# Patient Record
Sex: MALE | Race: WHITE | NOT HISPANIC OR LATINO | Employment: OTHER | URBAN - METROPOLITAN AREA
[De-identification: names, ages, dates, MRNs, and addresses within clinical notes are randomized per-mention and may not be internally consistent; named-entity substitution may affect disease eponyms.]

---

## 2020-01-01 ENCOUNTER — HOSPITAL ENCOUNTER (EMERGENCY)
Facility: HOSPITAL | Age: 75
Discharge: HOME | End: 2020-09-25
Attending: SURGERY
Payer: MEDICARE

## 2020-01-01 ENCOUNTER — APPOINTMENT (EMERGENCY)
Dept: CT IMAGING | Facility: HOSPITAL | Age: 75
End: 2020-01-01
Payer: MEDICARE

## 2020-01-01 ENCOUNTER — HOSPITAL ENCOUNTER (EMERGENCY)
Facility: HOSPITAL | Age: 75
Discharge: NURSING, REHAB OR LONG-TERM CARE | End: 2020-09-25
Attending: EMERGENCY MEDICINE | Admitting: EMERGENCY MEDICINE
Payer: MEDICARE

## 2020-01-01 VITALS
WEIGHT: 240 LBS | HEART RATE: 69 BPM | SYSTOLIC BLOOD PRESSURE: 120 MMHG | HEIGHT: 73 IN | BODY MASS INDEX: 31.81 KG/M2 | OXYGEN SATURATION: 98 % | RESPIRATION RATE: 18 BRPM | TEMPERATURE: 97.8 F | DIASTOLIC BLOOD PRESSURE: 79 MMHG

## 2020-01-01 VITALS
DIASTOLIC BLOOD PRESSURE: 70 MMHG | RESPIRATION RATE: 18 BRPM | TEMPERATURE: 97.4 F | HEART RATE: 74 BPM | OXYGEN SATURATION: 90 % | SYSTOLIC BLOOD PRESSURE: 115 MMHG

## 2020-01-01 DIAGNOSIS — S22.43XA CLOSED FRACTURE OF MULTIPLE RIBS OF BOTH SIDES, INITIAL ENCOUNTER: Primary | ICD-10-CM

## 2020-01-01 DIAGNOSIS — S02.2XXA CLOSED FRACTURE OF NASAL BONE, INITIAL ENCOUNTER: ICD-10-CM

## 2020-01-01 DIAGNOSIS — R79.1 SUPRATHERAPEUTIC INR: ICD-10-CM

## 2020-01-01 DIAGNOSIS — R91.1 LUNG NODULE: ICD-10-CM

## 2020-01-01 DIAGNOSIS — J98.11 ATELECTASIS: ICD-10-CM

## 2020-01-01 DIAGNOSIS — K57.90 DIVERTICULOSIS: ICD-10-CM

## 2020-01-01 DIAGNOSIS — R04.0 EPISTAXIS: ICD-10-CM

## 2020-01-01 DIAGNOSIS — R04.0 EPISTAXIS DUE TO TRAUMA: ICD-10-CM

## 2020-01-01 DIAGNOSIS — S32.010A CLOSED COMPRESSION FRACTURE OF BODY OF L1 VERTEBRA (HCC): ICD-10-CM

## 2020-01-01 DIAGNOSIS — M47.812 DJD (DEGENERATIVE JOINT DISEASE) OF CERVICAL SPINE: ICD-10-CM

## 2020-01-01 DIAGNOSIS — S32.009A LUMBAR TRANSVERSE PROCESS FRACTURE, CLOSED, INITIAL ENCOUNTER (HCC): ICD-10-CM

## 2020-01-01 DIAGNOSIS — K80.20 CHOLELITHIASIS: ICD-10-CM

## 2020-01-01 DIAGNOSIS — K40.90 INGUINAL HERNIA: ICD-10-CM
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
## 2020-01-01 DIAGNOSIS — I31.3 PERICARDIAL EFFUSION: ICD-10-CM

## 2020-01-01 DIAGNOSIS — S02.2XXA CLOSED FRACTURE OF NASAL BONE, INITIAL ENCOUNTER: Primary | ICD-10-CM

## 2020-01-01 LAB
ABO GROUP BLD: NORMAL
ABO GROUP BLD: NORMAL
ALBUMIN SERPL BCP-MCNC: 3.5 G/DL (ref 3.5–5)
ALP SERPL-CCNC: 202 U/L (ref 46–116)
ALT SERPL W P-5'-P-CCNC: 29 U/L (ref 12–78)
ANION GAP SERPL CALCULATED.3IONS-SCNC: 12 MMOL/L (ref 4–13)
APTT PPP: 69 SECONDS (ref 23–37)
AST SERPL W P-5'-P-CCNC: 24 U/L (ref 5–45)
ATRIAL RATE: 110 BPM
BASOPHILS # BLD AUTO: 0.04 THOUSANDS/ΜL (ref 0–0.1)
BASOPHILS NFR BLD AUTO: 1 % (ref 0–1)
BILIRUB SERPL-MCNC: 1.76 MG/DL (ref 0.2–1)
BLD GP AB SCN SERPL QL: NEGATIVE
BUN SERPL-MCNC: 18 MG/DL (ref 5–25)
CALCIUM SERPL-MCNC: 8.9 MG/DL (ref 8.3–10.1)
CHLORIDE SERPL-SCNC: 105 MMOL/L (ref 100–108)
CO2 SERPL-SCNC: 24 MMOL/L (ref 21–32)
CREAT SERPL-MCNC: 1.17 MG/DL (ref 0.6–1.3)
EOSINOPHIL # BLD AUTO: 0.15 THOUSAND/ΜL (ref 0–0.61)
EOSINOPHIL NFR BLD AUTO: 2 % (ref 0–6)
ERYTHROCYTE [DISTWIDTH] IN BLOOD BY AUTOMATED COUNT: 13.6 % (ref 11.6–15.1)
GFR SERPL CREATININE-BSD FRML MDRD: 61 ML/MIN/1.73SQ M
GLUCOSE SERPL-MCNC: 137 MG/DL (ref 65–140)
HCT VFR BLD AUTO: 44.1 % (ref 36.5–49.3)
HGB BLD-MCNC: 14.4 G/DL (ref 12–17)
IMM GRANULOCYTES # BLD AUTO: 0.03 THOUSAND/UL (ref 0–0.2)
IMM GRANULOCYTES NFR BLD AUTO: 0 % (ref 0–2)
INR PPP: 2.24 (ref 0.84–1.19)
INR PPP: 4.73 (ref 0.84–1.19)
LIPASE SERPL-CCNC: 161 U/L (ref 73–393)
LYMPHOCYTES # BLD AUTO: 1.24 THOUSANDS/ΜL (ref 0.6–4.47)
LYMPHOCYTES NFR BLD AUTO: 16 % (ref 14–44)
MCH RBC QN AUTO: 31.8 PG (ref 26.8–34.3)
MCHC RBC AUTO-ENTMCNC: 32.7 G/DL (ref 31.4–37.4)
MCV RBC AUTO: 97 FL (ref 82–98)
MONOCYTES # BLD AUTO: 0.99 THOUSAND/ΜL (ref 0.17–1.22)
MONOCYTES NFR BLD AUTO: 13 % (ref 4–12)
NEUTROPHILS # BLD AUTO: 5.5 THOUSANDS/ΜL (ref 1.85–7.62)
NEUTS SEG NFR BLD AUTO: 68 % (ref 43–75)
NRBC BLD AUTO-RTO: 0 /100 WBCS
PLATELET # BLD AUTO: 279 THOUSANDS/UL (ref 149–390)
PMV BLD AUTO: 8.6 FL (ref 8.9–12.7)
POTASSIUM SERPL-SCNC: 4 MMOL/L (ref 3.5–5.3)
PROT SERPL-MCNC: 7.1 G/DL (ref 6.4–8.2)
PROTHROMBIN TIME: 24.7 SECONDS (ref 11.6–14.5)
PROTHROMBIN TIME: 44.4 SECONDS (ref 11.6–14.5)
QRS AXIS: 56 DEGREES
QRSD INTERVAL: 86 MS
QT INTERVAL: 344 MS
QTC INTERVAL: 394 MS
RBC # BLD AUTO: 4.53 MILLION/UL (ref 3.88–5.62)
RH BLD: POSITIVE
RH BLD: POSITIVE
SODIUM SERPL-SCNC: 141 MMOL/L (ref 136–145)
SPECIMEN EXPIRATION DATE: NORMAL
T WAVE AXIS: 254 DEGREES
TROPONIN I SERPL-MCNC: <0.02 NG/ML
VENTRICULAR RATE: 79 BPM
WBC # BLD AUTO: 7.95 THOUSAND/UL (ref 4.31–10.16)

## 2020-01-01 PROCEDURE — 99284 EMERGENCY DEPT VISIT MOD MDM: CPT | Performed by: SURGERY

## 2020-01-01 PROCEDURE — 96361 HYDRATE IV INFUSION ADD-ON: CPT

## 2020-01-01 PROCEDURE — 86900 BLOOD TYPING SEROLOGIC ABO: CPT | Performed by: PHYSICIAN ASSISTANT

## 2020-01-01 PROCEDURE — 96365 THER/PROPH/DIAG IV INF INIT: CPT

## 2020-01-01 PROCEDURE — 99284 EMERGENCY DEPT VISIT MOD MDM: CPT

## 2020-01-01 PROCEDURE — 93010 ELECTROCARDIOGRAM REPORT: CPT | Performed by: INTERNAL MEDICINE

## 2020-01-01 PROCEDURE — 70450 CT HEAD/BRAIN W/O DYE: CPT

## 2020-01-01 PROCEDURE — 83690 ASSAY OF LIPASE: CPT | Performed by: PHYSICIAN ASSISTANT

## 2020-01-01 PROCEDURE — 96374 THER/PROPH/DIAG INJ IV PUSH: CPT

## 2020-01-01 PROCEDURE — 36415 COLL VENOUS BLD VENIPUNCTURE: CPT | Performed by: PHYSICIAN ASSISTANT

## 2020-01-01 PROCEDURE — 80053 COMPREHEN METABOLIC PANEL: CPT | Performed by: PHYSICIAN ASSISTANT

## 2020-01-01 PROCEDURE — 85610 PROTHROMBIN TIME: CPT | Performed by: PHYSICIAN ASSISTANT

## 2020-01-01 PROCEDURE — 86850 RBC ANTIBODY SCREEN: CPT | Performed by: PHYSICIAN ASSISTANT

## 2020-01-01 PROCEDURE — 71260 CT THORAX DX C+: CPT

## 2020-01-01 PROCEDURE — 30901 CONTROL OF NOSEBLEED: CPT | Performed by: PHYSICIAN ASSISTANT

## 2020-01-01 PROCEDURE — 99291 CRITICAL CARE FIRST HOUR: CPT

## 2020-01-01 PROCEDURE — 72125 CT NECK SPINE W/O DYE: CPT

## 2020-01-01 PROCEDURE — 99282 EMERGENCY DEPT VISIT SF MDM: CPT | Performed by: OTOLARYNGOLOGY

## 2020-01-01 PROCEDURE — 84484 ASSAY OF TROPONIN QUANT: CPT | Performed by: PHYSICIAN ASSISTANT

## 2020-01-01 PROCEDURE — 85730 THROMBOPLASTIN TIME PARTIAL: CPT | Performed by: PHYSICIAN ASSISTANT

## 2020-01-01 PROCEDURE — G1004 CDSM NDSC: HCPCS

## 2020-01-01 PROCEDURE — 74177 CT ABD & PELVIS W/CONTRAST: CPT

## 2020-01-01 PROCEDURE — 70486 CT MAXILLOFACIAL W/O DYE: CPT

## 2020-01-01 PROCEDURE — 99291 CRITICAL CARE FIRST HOUR: CPT | Performed by: PHYSICIAN ASSISTANT

## 2020-01-01 PROCEDURE — 86901 BLOOD TYPING SEROLOGIC RH(D): CPT | Performed by: PHYSICIAN ASSISTANT

## 2020-01-01 PROCEDURE — 93005 ELECTROCARDIOGRAM TRACING: CPT

## 2020-01-01 PROCEDURE — 85025 COMPLETE CBC W/AUTO DIFF WBC: CPT | Performed by: PHYSICIAN ASSISTANT

## 2020-01-01 RX ORDER — OXYMETAZOLINE HYDROCHLORIDE 0.05 G/100ML
2 SPRAY NASAL ONCE
Status: COMPLETED | OUTPATIENT
Start: 2020-01-01 | End: 2020-01-01

## 2020-01-01 RX ORDER — PHYTONADIONE 10 MG/ML
2.5 INJECTION, EMULSION INTRAMUSCULAR; INTRAVENOUS; SUBCUTANEOUS ONCE
Status: DISCONTINUED | OUTPATIENT
Start: 2020-01-01 | End: 2020-01-01

## 2020-01-01 RX ORDER — ATORVASTATIN CALCIUM 20 MG/1
10 TABLET, FILM COATED ORAL DAILY
COMMUNITY
Start: 2020-01-01 | End: 2021-01-01 | Stop reason: HOSPADM

## 2020-01-01 RX ORDER — OXYCODONE HYDROCHLORIDE 5 MG/1
2.5 TABLET ORAL EVERY 4 HOURS PRN
Status: DISCONTINUED | OUTPATIENT
Start: 2020-01-01 | End: 2020-01-01 | Stop reason: HOSPADM

## 2020-01-01 RX ORDER — LIDOCAINE HYDROCHLORIDE AND EPINEPHRINE 10; 10 MG/ML; UG/ML
10 INJECTION, SOLUTION INFILTRATION; PERINEURAL ONCE
Status: COMPLETED | OUTPATIENT
Start: 2020-01-01 | End: 2020-01-01

## 2020-01-01 RX ORDER — WARFARIN SODIUM 5 MG/1
5 TABLET ORAL DAILY
COMMUNITY
Start: 2020-01-01 | End: 2020-01-01

## 2020-01-01 RX ORDER — OXYCODONE HYDROCHLORIDE 5 MG/1
5 TABLET ORAL EVERY 4 HOURS PRN
Status: DISCONTINUED | OUTPATIENT
Start: 2020-01-01 | End: 2020-01-01 | Stop reason: HOSPADM

## 2020-01-01 RX ORDER — OMEPRAZOLE 20 MG/1
20 CAPSULE, DELAYED RELEASE ORAL DAILY
COMMUNITY
Start: 2020-01-01 | End: 2021-01-01 | Stop reason: SDUPTHER

## 2020-01-01 RX ORDER — FENTANYL CITRATE 50 UG/ML
50 INJECTION, SOLUTION INTRAMUSCULAR; INTRAVENOUS ONCE
Status: COMPLETED | OUTPATIENT
Start: 2020-01-01 | End: 2020-01-01

## 2020-01-01 RX ORDER — TAMSULOSIN HYDROCHLORIDE 0.4 MG/1
0.4 CAPSULE ORAL DAILY
COMMUNITY
Start: 2020-01-01 | End: 2021-01-01 | Stop reason: SDUPTHER

## 2020-01-01 RX ORDER — ACETAMINOPHEN 325 MG/1
975 TABLET ORAL EVERY 8 HOURS SCHEDULED
Status: DISCONTINUED | OUTPATIENT
Start: 2020-01-01 | End: 2020-01-01 | Stop reason: HOSPADM

## 2020-01-01 RX ORDER — TRANEXAMIC ACID 100 MG/ML
500 INJECTION, SOLUTION INTRAVENOUS ONCE
Status: COMPLETED | OUTPATIENT
Start: 2020-01-01 | End: 2020-01-01

## 2020-01-01 RX ADMIN — IOHEXOL 100 ML: 350 INJECTION, SOLUTION INTRAVENOUS at 06:10

## 2020-01-01 RX ADMIN — ACETAMINOPHEN 975 MG: 325 TABLET, FILM COATED ORAL at 13:39

## 2020-01-01 RX ADMIN — OXYCODONE HYDROCHLORIDE 5 MG: 5 TABLET ORAL at 11:16

## 2020-01-01 RX ADMIN — SILVER NITRATE APPLICATORS 1 APPLICATOR: 25; 75 STICK TOPICAL at 05:16

## 2020-01-01 RX ADMIN — OXYCODONE HYDROCHLORIDE 5 MG: 5 TABLET ORAL at 16:21

## 2020-01-01 RX ADMIN — LIDOCAINE HYDROCHLORIDE,EPINEPHRINE BITARTRATE 10 ML: 10; .01 INJECTION, SOLUTION INFILTRATION; PERINEURAL at 05:13

## 2020-01-01 RX ADMIN — PHYTONADIONE 2.5 MG: 10 INJECTION, EMULSION INTRAMUSCULAR; INTRAVENOUS; SUBCUTANEOUS at 10:45

## 2020-01-01 RX ADMIN — FENTANYL CITRATE 50 MCG: 50 INJECTION INTRAMUSCULAR; INTRAVENOUS at 05:58

## 2020-01-01 RX ADMIN — TRANEXAMIC ACID 500 MG: 1 INJECTION, SOLUTION INTRAVENOUS at 05:13

## 2020-01-01 RX ADMIN — OXYMETAZOLINE HYDROCHLORIDE 2 SPRAY: 0.05 SPRAY NASAL at 05:13

## 2020-01-01 RX ADMIN — SODIUM CHLORIDE 500 ML: 0.9 INJECTION, SOLUTION INTRAVENOUS at 05:16

## 2020-09-25 PROBLEM — R04.0 EPISTAXIS DUE TO TRAUMA: Status: ACTIVE | Noted: 2020-01-01

## 2020-09-25 PROBLEM — D68.9 COAGULOPATHY (HCC): Status: ACTIVE | Noted: 2020-01-01

## 2020-09-25 PROBLEM — S02.2XXA NASAL BONE FRACTURE: Status: ACTIVE | Noted: 2020-01-01

## 2020-09-25 PROBLEM — S32.009A LUMBAR TRANSVERSE PROCESS FRACTURE (HCC): Status: ACTIVE | Noted: 2020-01-01

## 2020-09-25 PROBLEM — S22.49XA MULTIPLE RIB FRACTURES: Status: ACTIVE | Noted: 2020-01-01
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
## 2020-09-25 NOTE — EMTALA/ACUTE CARE TRANSFER
[address] [place] [address]  Dept: [phone]      EMTALA TRANSFER CONSENT    NAME Loraine Mirza                                         1945                              MRN [id number]    I have been informed of my rights regarding examination, treatment, and transfer   by Dr Toscano att  providers found    Benefits: Specialized equipment and/or services available at the receiving facility (Include comment)________________________(Trauma Service)    Risks: Potential for delay in receiving treatment, Potential deterioration of medical condition, Loss of IV, Increased discomfort during transfer, Possible worsening of condition or death during transfer      Consent for Transfer:  I acknowledge that my medical condition has been evaluated and explained to me by the emergency department physician or other qualified medical person and/or my attending physician, who has recommended that I be transferred to the service of  Accepting Physician: Dr Gertrude Holter at [address] [place] [address] Name, [address] : Yunielamparo Kam  The above potential benefits of such transfer, the potential risks associated with such transfer, and the probable risks of not being transferred have been explained to me, and I fully understand them  The doctor has explained that, in my case, the benefits of transfer outweigh the risks  I agree to be transferred  I authorize the performance of emergency medical procedures and treatments upon me in both transit and upon arrival at the receiving facility  Additionally, I authorize the release of any and all medical records to the receiving facility and request they be transported with me, if possible  I understand that the safest mode of transportation during a medical emergency is an ambulance and that the Hospital advocates the use of this mode of transport   Risks of traveling to the receiving facility by car, including absence of medical control, life sustaining equipment, such as oxygen, and medical personnel has been explained to me and I fully understand them  (MIRANDA CORRECT BOX BELOW)  [place]  ]  I consent to the stated transfer and to be transported by ambulance/helicopter  [  ]  I consent to the stated transfer, but refuse transportation by ambulance and accept full responsibility for my transportation by car  I understand the risks of non-ambulance transfers and I exonerate the Hospital and its staff from any deterioration in my condition that results from this refusal     X___________________________________________    DATE  20  TIME________  Signature of patient or legally responsible individual signing on patient behalf           RELATIONSHIP TO PATIENT_________________________          Provider Certification    NAME Asim Franco                                        [facility] 1945                              MRN [id number]    A medical screening exam was performed on the above named patient  Based on the examination:    Condition Necessitating Transfer The primary encounter diagnosis was Closed fracture of multiple ribs of both sides, initial encounter  Diagnoses of Supratherapeutic INR, Epistaxis due to trauma, Closed fracture of nasal bone, initial encounter, Lumbar transverse process fracture, closed, initial encounter ([facility] [address] ), Fall from height of greater than 3 feet, Lung nodule, Atelectasis, Pericardial effusion, Cholelithiasis, Diverticulosis, Inguinal hernia, Closed compression fracture of body of L1 vertebra (HCC), and DJD (degenerative joint disease) of cervical spine were also pertinent to this visit      Patient Condition: The patient has been stabilized such that within reasonable medical probability, no material deterioration of the patient condition or the condition of the unborn child(chantelle) is likely to result from the transfer    Reason for Transfer: Other (Include comment)____________________(Trauma Service)    Transfer Requirements: [address]   · Space available and qualified personnel available for treatment as acknowledged by    · Agreed to accept transfer and to provide appropriate medical treatment as acknowledged by       Dr Gwen Quintana  · Appropriate medical records of the examination and treatment of the patient are provided at the time of transfer   [address] _______  · Transfer will be performed by qualified personnel from    and appropriate transfer equipment as required, including the use of necessary and appropriate life support measures  Provider Certification: I have examined the patient and explained the following risks and benefits of being transferred/refusing transfer to the patient/family:  General risk, such as traffic hazards, adverse weather conditions, rough terrain or turbulence, possible failure of equipment (including vehicle or aircraft), or consequences of actions of persons outside the control of the transport personnel, Unanticipated needs of medical equipment and personnel during transport, Risk of worsening condition, The possibility of a transport vehicle being unavailable, Consent was not obtained as patient is committed to psychiatric facility and transfer is mandated      Based on these reasonable risks and benefits to the patient and/or the unborn child(chantelle), and based upon the information available at the time of the patients examination, I certify that the medical benefits reasonably to be expected from the provision of appropriate medical treatments at another medical facility outweigh the increasing risks, if any, to the individuals medical condition, and in the case of labor to the unborn child, from effecting the transfer      X____________________________________________ DATE 09/25/20        TIME_______      ORIGINAL - SEND TO MEDICAL RECORDS   COPY - SEND WITH PATIENT DURING TRANSFER

## 2020-09-25 NOTE — ED PROVIDER NOTES
EMERGENCY MEDICINE NOTE        PATIENT IDENTIFICATION PHYSICIAN/SERVICE INFORMATION   Name: Kendall Bryant  MRN: [id number]  YOB: 1945  Age/Sex: 70 y o  male  Preferred Language: English  Code Status: No Order  Encounter Date: 9/25/2020  Attending Physician: Wendy Peoples MD  Admitting Physician: No admitting provider for patient encounter  Primary Care Physician: No primary care provider on file  Primary Care Phone: None       CHIEF COMPLAINT     Chief Complaint   Patient presents with    Nose Bleed     Pt reports ongoing nosebleed since he woke up around 1 hr ago, +thinners last took 4 days ago -dizziness          HISTORY OF PRESENT ILLNESS     Kendall Bryant is a 70 y o  male who presents due to Nosebleed  Pt reports Fall 9/19/2020, seen at [facility], had CXR which per patient without obvious fx, d/c home with tylenol, later returned 9/24/2020 for recurrent episode of nosebleed, received packing which was later in visit removed without recurrence of bleed and discharged home with instructions to hold coumadin yesterday, though resume today  Pt notes that he has not had coumadin for 4 days, as he has had multiple episodes of nosebleeds since fall  Pt now comes in with recurrent bleed blood in oropharynx, profuse left sided nasal bleeding, unable to control with afrin, applying pressure  Pt additional complaints of acute on chronic back pain, chest pain with movement/change positions/breathing  Denies palpitations, sob, lightheadedness, dizziness, syncope, abdominal pain, hematuria, hematochezia, hematemesis, and no other complaints at this time          History provided by:  Patient   used: No    Nose Bleed   Location:  L nare  Severity:  Severe  Duration:  2 hours  Timing:  Constant  Progression:  Waxing and waning  Chronicity:  Recurrent  Context: anticoagulants and trauma (fall from tree, approximately 15-20 ft on 9/19/2020)    Context: not aspirin use, not BiPAP, not bleeding disorder, not CPAP, not drug use, not elevation change, not foreign body, not home oxygen, not hypertension, not nose picking, not recent infection, not thrombocytopenia and not weather change    Relieved by:  Applying pressure  Worsened by:  Nothing  Ineffective treatments:  None tried  Associated symptoms: blood in oropharynx and facial pain    Associated symptoms: no congestion, no cough, no dizziness, no fever, no headaches, no sinus pain, no sneezing, no sore throat and no syncope    Risk factors: no alcohol use, no allergies, no change in medication, no frequent nosebleeds, no head and neck surgery, no head and neck tumor, no intranasal steroids, no liver disease, no radiation treatment, no recent chemotherapy, no recent nasal surgery and no sinus problems          PAST MEDICAL AND SURGICAL HISTORY     Past Medical History:   Diagnosis Date    Irregular heart beat        History reviewed  No pertinent surgical history  History reviewed  No pertinent family history  E-Cigarette/Vaping     E-Cigarette/Vaping Substances     Social History     Tobacco Use    Smoking status: Never Smoker    Smokeless tobacco: Never Used   Substance Use Topics    Alcohol use: Not Currently    Drug use: Never         ALLERGIES     No Known Allergies      HOME MEDICATIONS     Prior to Admission Medications   Prescriptions Last Dose Informant Patient Reported?  Taking?   atorvastatin (LIPITOR) 20 mg tablet 9/24/2020 at Unknown time  Yes Yes   Sig: Take 10 mg by mouth daily   omeprazole (PriLOSEC) 20 mg delayed release capsule 9/24/2020 at Unknown time  Yes Yes   Sig: Take 20 mg by mouth daily   tamsulosin (FLOMAX) 0 4 mg 9/24/2020 at Unknown time  Yes Yes   Sig: Take 0 4 mg by mouth daily   warfarin (COUMADIN) 5 mg tablet Past Week at Unknown time  Yes Yes   Sig: Take 5 mg by mouth daily      Facility-Administered Medications: None         REVIEW OF SYSTEMS     Review of Systems   Constitutional: Negative for activity change, appetite change, chills, diaphoresis, fatigue and fever  HENT: Positive for nosebleeds  Negative for congestion, drooling, ear discharge, ear pain, facial swelling, postnasal drip, rhinorrhea, sinus pressure, sinus pain, sneezing, sore throat, trouble swallowing and voice change  Eyes: Negative for discharge  Respiratory: Negative for apnea, cough, choking, chest tightness, shortness of breath, wheezing and stridor  Cardiovascular: Positive for chest pain  Negative for syncope  Gastrointestinal: Negative for abdominal distention, abdominal pain, constipation, diarrhea, nausea and vomiting  Genitourinary: Negative for decreased urine volume, difficulty urinating, dysuria, flank pain, frequency, genital sores, hematuria and urgency  Musculoskeletal: Negative for arthralgias and joint swelling  Skin: Negative for rash and wound  Neurological: Negative for dizziness, light-headedness and headaches  Hematological: Negative for adenopathy  Psychiatric/Behavioral: The patient is not nervous/anxious  All other systems reviewed and are negative  PHYSICAL EXAMINATION     ED Triage Vitals   Temperature Pulse Respirations Blood Pressure SpO2   09/25/20 0640 09/25/20 0506 09/25/20 0506 09/25/20 0506 09/25/20 0506   97 8 °F (36 6 °C) 104 18 136/70 99 %      Temp Source Heart Rate Source Patient Position - Orthostatic VS BP Location FiO2 (%)   09/25/20 0640 09/25/20 0506 09/25/20 0506 09/25/20 0506 --   Oral Monitor Sitting Right arm       Pain Score       09/25/20 0639       No Pain         Wt Readings from Last 3 Encounters:   09/25/20 109 kg (240 lb)      Primary Survey:     (A) Airway: intact  (B) Breathing: spontaneous, no flail chest  (C) Circulation: Pulses:   carotid  2/4, pedal  2/4, radial  2/4 and femoral  2/4  (D) Disabliity:  GCS Total:  15  (E) Expose:  Completed    Secondary Survey:    Cervical spine cleared by clinical criteria?  No (imaging required) Physical Exam  Vitals signs and nursing note reviewed  Constitutional:       General: He is in acute distress  Appearance: He is well-developed  He is not ill-appearing, toxic-appearing or diaphoretic  HENT:      Head: Normocephalic and atraumatic  Right Ear: Tympanic membrane, ear canal and external ear normal       Left Ear: Tympanic membrane, ear canal and external ear normal       Nose: Nasal deformity, signs of injury and nasal tenderness present  No mucosal edema, congestion or rhinorrhea  Right Nostril: No foreign body, epistaxis, septal hematoma or occlusion  Left Nostril: Epistaxis present  No foreign body, septal hematoma or occlusion  Right Sinus: No maxillary sinus tenderness or frontal sinus tenderness  Left Sinus: No maxillary sinus tenderness or frontal sinus tenderness  Mouth/Throat:      Mouth: Mucous membranes are moist       Comments: Blood in oropharynx  Eyes:      Extraocular Movements: Extraocular movements intact  Conjunctiva/sclera: Conjunctivae normal       Pupils: Pupils are equal, round, and reactive to light  Neck:      Musculoskeletal: Normal range of motion and neck supple  No muscular tenderness  Cardiovascular:      Rate and Rhythm: Normal rate  Rhythm irregularly irregular  Heart sounds: Normal heart sounds  No murmur  No friction rub  No gallop  Pulmonary:      Effort: Pulmonary effort is normal  No respiratory distress  Breath sounds: Normal breath sounds  No wheezing or rales  Chest:      Chest wall: Tenderness present  No mass, lacerations, deformity or crepitus  Abdominal:      General: Bowel sounds are normal  There is no distension  Palpations: Abdomen is soft  Abdomen is not rigid  There is no mass  Tenderness: There is no abdominal tenderness  There is no guarding or rebound  Negative signs include Xiao's sign and McBurney's sign  Hernia: No hernia is present        Comments: Negative Xiao's  Negative Appendiceal signs (Psoas, Rovsing's, Obturator)  Negative Peritoneal Signs   Musculoskeletal: Normal range of motion  Right shoulder: Normal       Left shoulder: Normal       Right elbow: Normal      Left elbow: Normal       Right wrist: Normal       Left wrist: Normal       Right hip: Normal       Left hip: Normal       Right knee: Normal       Left knee: Normal       Right ankle: Normal       Left ankle: Normal       Cervical back: Normal       Thoracic back: Normal       Lumbar back: He exhibits tenderness, bony tenderness and pain  He exhibits normal range of motion, no swelling, no edema, no deformity, no laceration and normal pulse  Right upper arm: Normal       Left upper arm: Normal       Right forearm: Normal       Left forearm: Normal       Right hand: Normal       Left hand: Normal       Right lower leg: Normal  No edema  Left lower leg: Normal  No edema  Right foot: Normal       Left foot: Normal    Skin:     General: Skin is warm and dry  Capillary Refill: Capillary refill takes less than 2 seconds  Coloration: Skin is not pale  Findings: No rash  Neurological:      General: No focal deficit present  Mental Status: He is alert and oriented to person, place, and time             DIAGNOSTIC RESULTS     Laboratory results:    Labs Reviewed   CBC AND DIFFERENTIAL - Abnormal       Result Value Ref Range Status    WBC 7 95  4 31 - 10 16 Thousand/uL Final    RBC 4 53  3 88 - 5 62 Million/uL Final    Hemoglobin 14 4  12 0 - 17 0 g/dL Final    Hematocrit 44 1  36 5 - 49 3 % Final    MCV 97  82 - 98 fL Final    MCH 31 8  26 8 - 34 3 pg Final    MCHC 32 7  31 4 - 37 4 g/dL Final    RDW 13 6  11 6 - 15 1 % Final    MPV 8 6 (*) 8 9 - 12 7 fL Final    Platelets 818  386 - 390 Thousands/uL Final    nRBC 0  /100 WBCs Final    Neutrophils Relative 68  43 - 75 % Final    Immat GRANS % 0  0 - 2 % Final    Lymphocytes Relative 16  14 - 44 % Final    Monocytes Relative 13 (*) 4 - 12 % Final    Eosinophils Relative 2  0 - 6 % Final    Basophils Relative 1  0 - 1 % Final    Neutrophils Absolute 5 50  1 85 - 7 62 Thousands/µL Final    Immature Grans Absolute 0 03  0 00 - 0 20 Thousand/uL Final    Lymphocytes Absolute 1 24  0 60 - 4 47 Thousands/µL Final    Monocytes Absolute 0 99  0 17 - 1 22 Thousand/µL Final    Eosinophils Absolute 0 15  0 00 - 0 61 Thousand/µL Final    Basophils Absolute 0 04  0 00 - 0 10 Thousands/µL Final   COMPREHENSIVE METABOLIC PANEL - Abnormal    Sodium 141  136 - 145 mmol/L Final    Potassium 4 0  3 5 - 5 3 mmol/L Final    Chloride 105  100 - 108 mmol/L Final    CO2 24  21 - 32 mmol/L Final    ANION GAP 12  4 - 13 mmol/L Final    BUN 18  5 - 25 mg/dL Final    Creatinine 1 17  0 60 - 1 30 mg/dL Final    Comment: Standardized to IDMS reference method    Glucose 137  65 - 140 mg/dL Final    Comment: If the patient is fasting, the ADA then defines impaired fasting glucose as > 100 mg/dL and diabetes as > or equal to 123 mg/dL  Specimen collection should occur prior to Sulfasalazine administration due to the potential for falsely depressed results  Specimen collection should occur prior to Sulfapyridine administration due to the potential for falsely elevated results  Calcium 8 9  8 3 - 10 1 mg/dL Final    AST 24  5 - 45 U/L Final    Comment: Specimen collection should occur prior to Sulfasalazine administration due to the potential for falsely depressed results  ALT 29  12 - 78 U/L Final    Comment: Specimen collection should occur prior to Sulfasalazine administration due to the potential for falsely depressed results       Alkaline Phosphatase 202 (*) 46 - 116 U/L Final    Total Protein 7 1  6 4 - 8 2 g/dL Final    Albumin 3 5  3 5 - 5 0 g/dL Final    Total Bilirubin 1 76 (*) 0 20 - 1 00 mg/dL Final    Comment: Use of this assay is not recommended for patients undergoing treatment with eltrombopag due to the potential for falsely elevated results  eGFR 61  ml/min/1 73sq m Final    Narrative:     [place] guidelines for Chronic Kidney Disease (CKD):     Stage 1 with normal or high GFR (GFR > 90 mL/min/1 73 square meters)    Stage 2 Mild CKD (GFR = 60-89 mL/min/1 73 square meters)    Stage 3A Moderate CKD (GFR = 45-59 mL/min/1 73 square meters)    Stage 3B Moderate CKD (GFR = 30-44 mL/min/1 73 square meters)    Stage 4 Severe CKD (GFR = 15-29 mL/min/1 73 square meters)    Stage 5 End Stage CKD (GFR <15 mL/min/1 73 square meters)  Note: GFR calculation is accurate only with a steady state creatinine   PROTIME-INR - Abnormal    Protime 44 4 (*) 11 6 - 14 5 seconds Final    INR 4 73 (*) 0 84 - 1 19 Final   APTT - Abnormal    PTT 69 (*) 23 - 37 seconds Final    Comment: Therapeutic Heparin Range =  60-90 seconds   LIPASE - Normal    Lipase 161  73 - 393 u/L Final   TROPONIN I - Normal    Troponin I <0 02  <=0 04 ng/mL Final    Comment: Autovalidation override  [employer] Chemistry analyzer 99% cutoff is > 0 04 ng/mL in network labs     o cTnI 99% cutoff is useful only when applied to patients in the clinical setting of myocardial ischemia   o cTnI 99% cutoff should be interpreted in the context of clinical history, ECG findings and possibly cardiac imaging to establish correct diagnosis  o cTnI 99% cutoff may be suggestive but clearly not indicative of a coronary event without the clinical setting of myocardial ischemia  TYPE AND SCREEN    ABO Grouping O   Final    Rh Factor Positive   Final    Antibody Screen Negative   Final    Specimen Expiration Date [id number]   Final   ABORH RECHECK    ABO Grouping O   Final    Rh Factor Positive   Final       All labs reviewed and utilized in the medical decision making process    Radiology results:    CT recon only thoracolumbar   Final Result      Acute to subacute nondisplaced fracture right L2, bilateral L3, and left L4 transverse processes                    Workstation performed: [id number]         CT chest abdomen pelvis w contrast   Final Result      CT chest:      Acute to subacute nondisplaced fracture of the right fourth through eighth and left third through sixth and left eighth anterolateral ribs  Acute to subacute minimally displaced left seventh anterolateral rib  Trace right pleural effusion  Minimal multilobar subsegmental atelectasis more prominent in the lower lobes  No pneumothorax  Cardiomegaly and small pericardial effusion  5 mm noncalcified nodule left upper lobe with unknown long-term stability  Based on current Fleischner Society 2017 Guidelines on incidental pulmonary nodule, no routine follow-up is needed if the patient is considered low risk for lung cancer  If the    patient is considered high risk for lung cancer, 12 month follow-up non-contrast chest CT is recommended  CT abdomen and pelvis:      Acute to subacute nondisplaced fracture right L2, bilateral L3, and left L4 transverse processes  No acute intra-abdominal or intrapelvic process  Cholelithiasis  Colonic diverticulosis  This study demonstrates a significant  finding and was documented as such in [facility] for liaison and referring practitioner notification  Workstation performed: [id number]         CT head without contrast   Final Result      No acute intracranial process  No skull fracture  Chronic microangiopathy  Workstation performed: [id number]         CT spine cervical without contrast   Final Result      No cervical spine fracture or traumatic malalignment  Mild multilevel cervical degenerative changes  Workstation performed: [id number]         CT facial bones without contrast   Final Result      Age-indeterminate tiny chip fracture of the nasal spine just left of midline  No other fracture or dislocation  Globes are intact  No orbital hematoma  Workstation performed: [id number]             All radiology studies independently viewed by me and interpreted by the radiologist       PROCEDURES     ECG 12 Lead Documentation Only    Date/Time: 9/25/2020 5:55 AM  Performed by: Mikki Bender PA-C  Authorized by: Mikki Bender PA-C     Indications / Diagnosis:  Fall from 15+ feet  ECG reviewed by me, the ED Provider: yes    Patient location:  ED  Previous ECG:     Previous ECG:  Unavailable    Comparison to cardiac monitor: Yes    Interpretation:     Interpretation: normal    Rate:     ECG rate assessment: normal    Rhythm:     Rhythm: atrial fibrillation    Ectopy:     Ectopy: none    QRS:     QRS axis:  Normal    QRS intervals:  Normal  Conduction:     Conduction: normal    ST segments:     ST segments:  Non-specific  T waves:     T waves: non-specific    CriticalCare Time  Performed by: Mikki Bender PA-C  Authorized by:  Mikki Bender PA-C     Critical care provider statement:     Critical care time (minutes):  40    Critical care time was exclusive of:  Separately billable procedures and treating other patients    Critical care was necessary to treat or prevent imminent or life-threatening deterioration of the following conditions:  Trauma    Critical care was time spent personally by me on the following activities:  Blood draw for specimens, obtaining history from patient or surrogate, development of treatment plan with patient or surrogate, discussions with consultants, evaluation of patient's response to treatment, examination of patient, interpretation of cardiac output measurements, ordering and performing treatments and interventions, ordering and review of laboratory studies, ordering and review of radiographic studies, re-evaluation of patient's condition and review of old charts    I assumed direction of critical care for this patient from another provider in my specialty: no    Epistaxis management    Date/Time: 9/25/2020 5:46 AM  Performed by: Lolita Rosales PA-C  Authorized by: Lolita Rosales PA-C     Patient location:  ED  Consent:     Consent obtained:  Verbal    Consent given by:  Patient    Risks discussed:  Bleeding, infection, nasal injury and pain    Alternatives discussed:  No treatment, delayed treatment, alternative treatment, observation and referral  Universal protocol:     Procedure explained and questions answered to patient or proxy's satisfaction: yes      Patient identity confirmed:  Verbally with patient, arm band and hospital-assigned identification number  Anesthesia (see MAR for exact dosages): Anesthesia method:  None  Procedure details:     Treatment site:  L posterior and L anterior    Hemostasis method:  Posterior pack and nasal balloon    Approach:  External    Spec Headlamp used: No      Treatment complexity:  Extensive    Treatment episode: recurring    Post-procedure details:     Assessment:  Bleeding stopped    Patient tolerance of procedure:   Tolerated well, no immediate complications          Invasive Devices     Peripheral Intravenous Line            Peripheral IV 09/25/20 Left Forearm less than 1 day                ASSESSMENT AND PLAN     MDM  Number of Diagnoses or Management Options  Atelectasis: new, no workup  Cholelithiasis: new, no workup  Closed fracture of multiple ribs of both sides, initial encounter: new, needed workup  Closed fracture of nasal bone, initial encounter: new, needed workup  Epistaxis due to trauma: new, needed workup  Fall from height of greater than 3 feet: new, needed workup  Lumbar transverse process fracture, closed, initial encounter [facility]): new, needed workup  Lung nodule: new, no workup  Pericardial effusion: new, needed workup  Supratherapeutic INR: new, needed workup     Amount and/or Complexity of Data Reviewed  Clinical lab tests: ordered and reviewed  Tests in the radiology section of CPT®: ordered and reviewed  Tests in the medicine section of CPT®: reviewed and ordered  Obtain history from someone other than the patient: yes  Review and summarize past medical records: yes  Discuss the patient with other providers: yes  Independent visualization of images, tracings, or specimens: yes    Risk of Complications, Morbidity, and/or Mortality  Presenting problems: high  Diagnostic procedures: high  Management options: high    Patient Progress  Patient progress: improved      Initial ED assessment:  Bety Amador is a 70 y o  male with significant PMH for A fib on Coumadin who presents with Nosebleed, Chest Pain, Fall  Vitals signs reviewed and WNL  Physical examination is remarkable for chest wall ttp, lumbar spine ttp, left sided epistaxis suspect anterior + mid posterior though difficult to visualize    Initial Ddx  includes but is not limited to:   anterior epistaxis, posterior epistaxis, anemia, bleeding diathesis, coagulopathy, hypertensive urgency  and  intracranial injury, concussion, scalp laceration, cervical injury; intrathoracic injury, intraabdominal injury, extremity injury    Initial ED plan:   Plan will be to perform diagnostic testing of Blood labs, EKG and CT of head, facial bones, cervical spine, chest abdomen pelvis, recon thoracolumbar and treat symptomatically  Final ED summary/disposition: TRANSFER: Discussed course of care with Patient agreeable to transfer for further eval, treatment as unavailable at this campus  Placed transfer request and spoke with Dr Del Castillo Sanjay Attending who is aware of the patient, case discussed including HPI, pertinent PMH, ED Course, and workup, agreed with plan and will accept for admission/observation to trauma service  Transfer documents completed  MDM  Reviewed: previous chart, nursing note and vitals  Reviewed previous: x-ray  Interpretation: labs, CT scan and ECG  Total time providing critical care: 30-74 minutes  This excludes time spent performing separately reportable procedures and services            ED COURSE OF CARE AND REASSESSMENT          US AUDIT      Most Recent Value   Initial Alcohol Screen: US AUDIT-C    1  How often do you have a drink containing alcohol?  0 Filed at: 09/25/2020 0643   2  How many drinks containing alcohol do you have on a typical day you are drinking? 0 Filed at: 09/25/2020 0643   3a  Male UNDER 65: How often do you have five or more drinks on one occasion? 0 Filed at: 09/25/2020 0643   3b  FEMALE Any Age, or MALE 65+: How often do you have 4 or more drinks on one occassion? 0 Filed at: 09/25/2020 0643   Audit-C Score  0 Filed at: 09/25/2020 1309                  ANGELICA/DAST-10      Most Recent Value   ANGELICA: How many times in the past year have you    Used an illegal drug or used a prescription medication for non-medical reasons?   Never Filed at: 09/25/2020 0643                          Medications   lidocaine-epinephrine (XYLOCAINE/EPINEPHRINE) 1 %-1:100,000 injection 10 mL (10 mL Infiltration Given 9/25/20 0513)   oxymetazoline (AFRIN) 0 05 % nasal spray 2 spray (2 sprays Each Nare Given 9/25/20 0513)   tranexamic acid 100mg/mL (for epistaxis) 500 mg (500 mg Nasal Given 9/25/20 0513)   silver nitrate-potassium nitrate (ARZOL SILVER NITRATE) 54-79 % applicator 1 applicator (1 applicator Topical Given by Other 9/25/20 0516)   sodium chloride 0 9 % bolus 500 mL (0 mL Intravenous Stopped 9/25/20 0603)   fentanyl citrate (PF) 100 MCG/2ML 50 mcg (50 mcg Intravenous Given 9/25/20 0558)   iohexol (OMNIPAQUE) 350 MG/ML injection (SINGLE-DOSE) 100 mL (100 mL Intravenous Given 9/25/20 0610)         FINAL IMPRESSION     Final diagnoses:   Closed fracture of multiple ribs of both sides, initial encounter   Supratherapeutic INR   Epistaxis due to trauma   Closed fracture of nasal bone, initial encounter   Lumbar transverse process fracture, closed, initial encounter ([facility] [address] ) - multiple   Fall from height of greater than 3 feet   Lung nodule   Atelectasis   Pericardial effusion   Cholelithiasis   Diverticulosis Inguinal hernia   Closed compression fracture of body of L1 vertebra (HCC)   DJD (degenerative joint disease) of cervical spine         DISPOSITION AND PLANNING     Time reflects when diagnosis was documented in both MDM as applicable and the Disposition within this note     Time User Action Codes Description Comment    9/25/2020  7:22 AM Almetta Laura Add [S22 43XA] Closed fracture of multiple ribs of both sides, initial encounter     9/25/2020  7:23 AM Shoaib Bolaños Add [R79 1] Supratherapeutic INR     9/25/2020  7:23 AM Almetta Laura Add [R04 0] Epistaxis due to trauma     9/25/2020  7:24 AM Almetta Laura Add [S02  2XXA] Closed fracture of nasal bone, initial encounter     9/25/2020  7:26 AM Almetta Laura Add [S32 009A] Lumbar transverse process fracture, closed, initial encounter ([facility] [address] )     9/25/2020  7:26 AM Almetta Laura Modify [S32 009A] Lumbar transverse process fracture, closed, initial encounter ([facility] [address] ) multiple    9/25/2020  7:26 AM Almetta Laura Add Nicki Nicolas Fall from height of greater than 3 feet     9/25/2020  7:27 AM Shoaib Bolaños Add [R91 1] Lung nodule     9/25/2020  7:27 AM Almetta Laura Add [J98 11] Atelectasis     9/25/2020  7:27 AM Almetta Laura Add [I31 3] Pericardial effusion     9/25/2020  7:28 AM Almetta Laura Add [K80 20] Cholelithiasis     9/25/2020  7:29 AM Almetta Laura Add [K57 90] Diverticulosis     9/25/2020  7:35 AM Almetta Laura Add [K40 90] Inguinal hernia     9/25/2020  7:35 AM Almetta Laura Add [S32 010A] Closed compression fracture of body of L1 vertebra ([facility] [address] )     9/25/2020  7:36 AM Almetta Laura Add [A54 615] DJD (degenerative joint disease) of cervical spine       ED Disposition     ED Disposition Condition Date/Time Comment    Transfer to Another Facility-In Network  Fri Sep 25, 2020  7:22 AM Vicente Duty should be transferred out to [address] [place] [address]         MD Documentation      Most Recent Value   Patient Condition  The patient has been stabilized such that within reasonable medical probability, no material deterioration of the patient condition or the condition of the unborn child(chantelle) is likely to result from the transfer   Reason for Transfer  Other (Include comment)____________________ [facility]]   Benefits of Transfer  Specialized equipment and/or services available at the receiving facility (Include comment)________________________ [facility]]   Risks of Transfer  Potential for delay in receiving treatment, Potential deterioration of medical condition, Loss of IV, Increased discomfort during transfer, Possible worsening of condition or death during transfer   Accepting Physician  Dr Bueno Sample Name, Ankit López MD  [facility], Kendrick Lopez MD   Provider Certification  General risk, such as traffic hazards, adverse weather conditions, rough terrain or turbulence, possible failure of equipment (including vehicle or aircraft), or consequences of actions of persons outside the control of the transport personnel, Unanticipated needs of medical equipment and personnel during transport, Risk of worsening condition, The possibility of a transport vehicle being unavailable, Consent was not obtained as patient is committed to psychiatric facility and transfer is mandated      RN Documentation      [address] Name, Ankit Lechuga      Follow-up Information    None             PATIENT REFERRAL     No follow-up provider specified  DISCHARGE MEDICATIONS     Patient's Medications   Discharge Prescriptions    No medications on file       No discharge procedures on file      PDMP Review     None          KATHIE Brown PA-C  09/25/20 464 Abdirizak Roy PA-C  09/25/20 464 Abdirizak Roy PA-C  09/25/20 2015

## 2020-09-25 NOTE — ASSESSMENT & PLAN NOTE
- awaiting evaluation by OMFS and ENT  - can likely be discharged to home today if cleared by the above consultants  - follow up in trauma clinic in 2 weeks

## 2020-09-25 NOTE — ASSESSMENT & PLAN NOTE
- minimally bothersome  - pain management as needed  - ambulating without difficulty  - follow-up in trauma clinic as an outpatient

## 2020-09-25 NOTE — ASSESSMENT & PLAN NOTE
- patient was packed and a balloon placed prior to transfer  - ENT consult  - correct coagulopathy as patient's INR is above 4

## 2020-09-25 NOTE — ED NOTES
Report called to Dom Carrington, RN at [facility]       Tila Piper RN  09/25/20 9535

## 2020-09-25 NOTE — ED NOTES
Claudell Sadie, daughter of the patient, phone number [phone]  Call daughter when pt is going to be released  She will pick him up from here        Tricia Sommers, RN  09/25/20 8033

## 2020-09-25 NOTE — H&P
H&P- Vicente Reyna 1945, 70 y o  male MRN: [id number]    Unit/Bed#: ED 24 Encounter: [id number]    Primary Care Provider: No primary care provider on file  Date and time admitted to hospital: 9/25/2020  9:15 AM        Coagulopathy (HCC)  Assessment & Plan  - correct coagulopathy as patient's INR is above 4 despite holding his Coumadin for the past several days and patient presenting with epistaxis    Epistaxis due to trauma  Assessment & Plan  - patient was packed and a balloon placed prior to transfer  - ENT consult  - correct coagulopathy as patient's INR is above 4    Lumbar transverse process fracture (HCC)  Assessment & Plan  - minimally bothersome  - pain management as needed  - ambulating without difficulty  - follow-up in trauma clinic as an outpatient    Nasal bone fracture  Assessment & Plan  - consult OMFS    Multiple rib fractures  Assessment & Plan  - pain management  - patient is relatively comfortable from a rib fracture standpoint  He is able to ambulate without difficulty  No shortness of breath  Likely no need for inpatient stay from a rib fracture standpoint  Can follow up in the trauma clinic as an outpatient    * [address]  - awaiting evaluation by OMFS and ENT  - can likely be discharged to home today if cleared by the above consultants  - follow up in trauma clinic in 2 weeks            Assessment/Plan   Trauma Alert: Other transfer from [address] [facility] [address]: Ambulance  Trauma Team: Attending Matilde Burleson and [address]  Consultants: ENT, OMFS    Chief Complaint:  Nose bleed    History of Present Illness   HPI:  Vicente Reyna is a 70 y o  male who presents with nose bleed  Patient states that he fell 2 weeks ago  He complains of both rib and back pain  This has been quite manageable as an outpatient, not restricting him from his daily living  However, earlier, he began with profuse bleeding from his nose    Patient states that he is on Coumadin for atrial fibrillation but has held his Coumadin for several days  It appears that his nose was packed as well as a balloon placed for control of bleeding prior to transfer  There has been no further bleeding since that time  Mechanism:Fall    Review of Systems    12-point, complete review of systems was reviewed and negative except as stated above  Historical Information     Past Medical History:   Diagnosis Date    Irregular heart beat      No past surgical history on file  Social History   Social History     Substance and Sexual Activity   Alcohol Use Not Currently     Social History     Substance and Sexual Activity   Drug Use Never     Social History     Tobacco Use   Smoking Status Never Smoker   Smokeless Tobacco Never Used     E-Cigarette/Vaping     E-Cigarette/Vaping Substances       There is no immunization history on file for this patient  Last Tetanus: unknown  Family History: Non-contributory        Meds/Allergies   all current active meds have been reviewed    No Known Allergies      PHYSICAL EXAM      Objective   Vitals:   First set: Temperature: (!) 97 4 °F (36 3 °C) (09/25/20 0923)  Pulse: 86 (09/25/20 0923)  Respirations: 18 (09/25/20 0923)  Blood Pressure: 126/80 (09/25/20 0923)    Primary Survey:   (A) Airway: intact  (B) Breathing: present breath sounds bilaterally  (C) Circulation: Pulses:   normal  (D) Disabliity:  GCS Total:  15  (E) Expose:  Completed    Secondary Survey: (Click on Physical Exam tab above)  Physical Exam  HENT:      Head: Normocephalic  Right Ear: Tympanic membrane and external ear normal       Left Ear: Tympanic membrane and external ear normal       Nose:      Comments: Packing and balloon in place left nare  No active bleeding visualized     Mouth/Throat:      Mouth: Mucous membranes are moist    Eyes:      Pupils: Pupils are equal, round, and reactive to light  Neck:      Musculoskeletal: Normal range of motion  Cardiovascular:      Rate and Rhythm: Normal rate  Pulmonary:      Effort: Pulmonary effort is normal  No respiratory distress  Breath sounds: Normal breath sounds  No stridor  No wheezing, rhonchi or rales  Abdominal:      General: Abdomen is flat  Bowel sounds are normal  There is no distension  Tenderness: There is no abdominal tenderness  There is no guarding  Musculoskeletal: Normal range of motion  General: No swelling or tenderness  Comments: No C/T/L spine tenderness, no step offs, no perineal hematoma   Skin:     General: Skin is warm and dry  Neurological:      General: No focal deficit present  Mental Status: He is oriented to person, place, and time  Invasive Devices     Peripheral Intravenous Line            Peripheral IV 09/25/20 Left Forearm less than 1 day                Lab Results: Results: I have personally reviewed pertinent reports  Imaging/EKG Studies: Results: I have personally reviewed pertinent reports      Other Studies: n/a    Code Status: No Order  Advance Directive and Living Will:      Power of :    POLST:

## 2020-09-25 NOTE — DISCHARGE INSTRUCTIONS
Rib Fracture Discharge Instructions: Your rib fractures will take time to heal  Do not do any strenuous activity or lift any thing more than 10 pounds until you are cleared to do so by the Trauma clinic  Take your pain medication, if needed, as prescribed and make sure you continue to perform cough and deep breathing exercises and use your incentive spirometer every two hours while awake to maintain healthy lungs post injury  You should be seen in the Trauma Clinic 2-3 weeks after being discharged  Please call the Trauma Clinic sooner if you have any questions or concerns or if you experience increased work of breathing, shortness of breath, difficulty breathing, activity intolerance or chest pain       Follow up with your PCP for Coumadin and INR follow up

## 2020-09-25 NOTE — ED NOTES
Pt transport scheduled for 0830 to hospitals ED   Accepting provider is Dr Carlee Peña, phone number for report is [phone], [address] [facility]  09/25/20 7551

## 2020-09-25 NOTE — ASSESSMENT & PLAN NOTE
- pain management  - patient is relatively comfortable from a rib fracture standpoint  He is able to ambulate without difficulty  No shortness of breath  Likely no need for inpatient stay from a rib fracture standpoint    Can follow up in the trauma clinic as an outpatient

## 2020-09-25 NOTE — ASSESSMENT & PLAN NOTE
- correct coagulopathy as patient's INR is above 4 despite holding his Coumadin for the past several days and patient presenting with epistaxis

## 2020-09-25 NOTE — CONSULTS
[facility]  Monserrat Dallas 70 y o  male MRN: [id number]    Devan Zepeda MD  Office : [phone]    Consult Note:    A/P  Nasal trauma with non displaced nasal fracture, septum midline, no intervention needed  Epistaxis secondary to warfarin overdose, now corrected, pack removed  Nasal saline spray,  gentle nose blowing,   F/u prn      H &P:   Monserrat Dallas is a 70 y o  male with severe left Nosebleed  He had fall from ladder 6 days ago and has beeb having recurrent epistaxis  This prompted him to hold Warfarin, and has not taken it "last 4 days"   Patient was seen at [facility] and a left rhini rapid pack placed with control of bleed  Transferred to [facility] trauma team  PT INR at admission 4 73   Vitamin K was administered this morning  CT maxillofacial reveals a non displaced fracture, septum midline, also reported small fracture at nasal spine  ENT consulted  Physical Exam   Blood pressure 107/60, pulse 87, temperature (!) 97 4 °F (36 3 °C), temperature source Oral, resp  rate 18, SpO2 97 %  Constitutional: Oriented to person, place, and time  Well-developed and well-nourished, no apparent distress, non-toxic appearance  Right Ear: External ear normal   Auditory canal clear  Tympanic membrane well-appearing, no fluid present  Left Ear: External ear normal   Auditory canal clear  Tympanic membrane well-appearing, no fluid present  Nose: nasal pack in place, no evident bleed  Oral cavity: Dentition intact  Mucosa moist, lips normal   Tongue mobile, floor of mouth normal   Hard palate unremarkable  No masses or lesions  Oropharynx: Tonsils unremarkable  Posterior pharyngeal wall clear  No masses or lesions  Salivary glands:  , no enlargement or tenderness  Neck: Parotid glands and submandibular glands symmetric, No masses or lesions, Soft and flat  No palpable adenopathy  Trachea midline  Pulmonary/Chest: Normal effort and rate   No respiratory distress  Musculoskeletal: Normal range of motion  Neurological: Cranial nerves 2-12 intact  Assessment:  1  Closed fracture of nasal bone, initial encounter  Inpatient consult to Oral and Maxillofacial Surgery    Inpatient consult to Oral and Maxillofacial Surgery   2  Epistaxis  Inpatient consult to ENT    Inpatient consult to ENT     On follow up evaluatin the INR  Is now 2 24  Given that the trauma was several days ago and epistaxis related to warfarin overdose now correted, the packing is removed  Septum is midline, no blood in nose, no evident mucosal tear or active bleeder           F/u as outpatient in the ENT office,

## 2021-01-01 ENCOUNTER — HOSPITAL ENCOUNTER (INPATIENT)
Facility: HOSPITAL | Age: 76
LOS: 18 days | DRG: 208 | End: 2021-02-23
Attending: EMERGENCY MEDICINE | Admitting: INTERNAL MEDICINE
Payer: MEDICARE

## 2021-01-01 ENCOUNTER — APPOINTMENT (INPATIENT)
Dept: RADIOLOGY | Facility: HOSPITAL | Age: 76
DRG: 208 | End: 2021-01-01
Payer: MEDICARE

## 2021-01-01 ENCOUNTER — APPOINTMENT (EMERGENCY)
Dept: RADIOLOGY | Facility: HOSPITAL | Age: 76
DRG: 208 | End: 2021-01-01
Payer: MEDICARE

## 2021-01-01 ENCOUNTER — APPOINTMENT (INPATIENT)
Dept: NON INVASIVE DIAGNOSTICS | Facility: HOSPITAL | Age: 76
DRG: 208 | End: 2021-01-01
Payer: MEDICARE

## 2021-01-01 ENCOUNTER — APPOINTMENT (INPATIENT)
Dept: CT IMAGING | Facility: HOSPITAL | Age: 76
DRG: 208 | End: 2021-01-01
Payer: MEDICARE

## 2021-01-01 ENCOUNTER — APPOINTMENT (INPATIENT)
Dept: VASCULAR ULTRASOUND | Facility: HOSPITAL | Age: 76
DRG: 208 | End: 2021-01-01
Payer: MEDICARE

## 2021-01-01 VITALS
DIASTOLIC BLOOD PRESSURE: 54 MMHG | WEIGHT: 202.82 LBS | TEMPERATURE: 99.7 F | SYSTOLIC BLOOD PRESSURE: 143 MMHG | BODY MASS INDEX: 26.76 KG/M2 | HEART RATE: 106 BPM | OXYGEN SATURATION: 16 % | RESPIRATION RATE: 106 BRPM

## 2021-01-01 DIAGNOSIS — U07.1 COVID-19: Primary | ICD-10-CM

## 2021-01-01 DIAGNOSIS — R77.8 ELEVATED TROPONIN: ICD-10-CM

## 2021-01-01 DIAGNOSIS — J96.01 ACUTE RESPIRATORY FAILURE WITH HYPOXIA (HCC): ICD-10-CM

## 2021-01-01 DIAGNOSIS — R06.00 DYSPNEA: ICD-10-CM

## 2021-01-01 DIAGNOSIS — U07.1 PNEUMONIA DUE TO COVID-19 VIRUS: ICD-10-CM

## 2021-01-01 DIAGNOSIS — J12.82 PNEUMONIA DUE TO COVID-19 VIRUS: ICD-10-CM

## 2021-01-01 DIAGNOSIS — R63.4 WEIGHT LOSS: ICD-10-CM

## 2021-01-01 LAB
ABO GROUP BLD BPU: NORMAL
ABO GROUP BLD: NORMAL
ABO GROUP BLD: NORMAL
ALBUMIN SERPL BCP-MCNC: 2.1 G/DL (ref 3.5–5)
ALBUMIN SERPL BCP-MCNC: 2.2 G/DL (ref 3.5–5)
ALBUMIN SERPL BCP-MCNC: 2.4 G/DL (ref 3.5–5)
ALBUMIN SERPL BCP-MCNC: 2.5 G/DL (ref 3.5–5)
ALBUMIN SERPL BCP-MCNC: 2.5 G/DL (ref 3.5–5)
ALBUMIN SERPL BCP-MCNC: 2.6 G/DL (ref 3.5–5)
ALBUMIN SERPL BCP-MCNC: 2.9 G/DL (ref 3.5–5)
ALP SERPL-CCNC: 115 U/L (ref 46–116)
ALP SERPL-CCNC: 119 U/L (ref 46–116)
ALP SERPL-CCNC: 123 U/L (ref 46–116)
ALP SERPL-CCNC: 125 U/L (ref 46–116)
ALP SERPL-CCNC: 133 U/L (ref 46–116)
ALP SERPL-CCNC: 145 U/L (ref 46–116)
ALP SERPL-CCNC: 151 U/L (ref 46–116)
ALT SERPL W P-5'-P-CCNC: 111 U/L (ref 12–78)
ALT SERPL W P-5'-P-CCNC: 133 U/L (ref 12–78)
ALT SERPL W P-5'-P-CCNC: 25 U/L (ref 12–78)
ALT SERPL W P-5'-P-CCNC: 41 U/L (ref 12–78)
ALT SERPL W P-5'-P-CCNC: 44 U/L (ref 12–78)
ALT SERPL W P-5'-P-CCNC: 44 U/L (ref 12–78)
ALT SERPL W P-5'-P-CCNC: 46 U/L (ref 12–78)
ANCILLARY VALUES: ABNORMAL
ANION GAP SERPL CALCULATED.3IONS-SCNC: 11 MMOL/L (ref 4–13)
ANION GAP SERPL CALCULATED.3IONS-SCNC: 13 MMOL/L (ref 4–13)
ANION GAP SERPL CALCULATED.3IONS-SCNC: 3 MMOL/L (ref 4–13)
ANION GAP SERPL CALCULATED.3IONS-SCNC: 3 MMOL/L (ref 4–13)
ANION GAP SERPL CALCULATED.3IONS-SCNC: 5 MMOL/L (ref 4–13)
ANION GAP SERPL CALCULATED.3IONS-SCNC: 6 MMOL/L (ref 4–13)
ANION GAP SERPL CALCULATED.3IONS-SCNC: 6 MMOL/L (ref 4–13)
ANION GAP SERPL CALCULATED.3IONS-SCNC: 7 MMOL/L (ref 4–13)
ANION GAP SERPL CALCULATED.3IONS-SCNC: 8 MMOL/L (ref 4–13)
ANION GAP SERPL CALCULATED.3IONS-SCNC: 9 MMOL/L (ref 4–13)
ANISOCYTOSIS BLD QL SMEAR: PRESENT
APTT PPP: 33 SECONDS (ref 23–37)
APTT PPP: 46 SECONDS (ref 23–37)
ARTERIAL PATENCY WRIST A: ABNORMAL
ARTERIAL PATENCY WRIST A: ABNORMAL
AST SERPL W P-5'-P-CCNC: 41 U/L (ref 5–45)
AST SERPL W P-5'-P-CCNC: 45 U/L (ref 5–45)
AST SERPL W P-5'-P-CCNC: 53 U/L (ref 5–45)
AST SERPL W P-5'-P-CCNC: 56 U/L (ref 5–45)
AST SERPL W P-5'-P-CCNC: 62 U/L (ref 5–45)
AST SERPL W P-5'-P-CCNC: 76 U/L (ref 5–45)
AST SERPL W P-5'-P-CCNC: 86 U/L (ref 5–45)
ATRIAL RATE: 101 BPM
ATRIAL RATE: 104 BPM
ATRIAL RATE: 107 BPM
ATRIAL RATE: 110 BPM
ATRIAL RATE: 131 BPM
ATRIAL RATE: 53 BPM
ATRIAL RATE: 78 BPM
BASE EXCESS BLDA CALC-SCNC: -12 MMOL/L (ref -2–3)
BASE EXCESS BLDA CALC-SCNC: -2 MMOL/L (ref -2–3)
BASE EXCESS BLDA CALC-SCNC: -3 MMOL/L (ref -2–3)
BASOPHILS # BLD AUTO: 0.01 THOUSANDS/ΜL (ref 0–0.1)
BASOPHILS # BLD AUTO: 0.02 THOUSANDS/ΜL (ref 0–0.1)
BASOPHILS # BLD AUTO: 0.02 THOUSANDS/ΜL (ref 0–0.1)
BASOPHILS # BLD AUTO: 0.03 THOUSANDS/ΜL (ref 0–0.1)
BASOPHILS # BLD AUTO: 0.04 THOUSANDS/ΜL (ref 0–0.1)
BASOPHILS # BLD AUTO: 0.05 THOUSANDS/ΜL (ref 0–0.1)
BASOPHILS # BLD MANUAL: 0 THOUSAND/UL (ref 0–0.1)
BASOPHILS # BLD MANUAL: 0.09 THOUSAND/UL (ref 0–0.1)
BASOPHILS NFR BLD AUTO: 0 % (ref 0–1)
BASOPHILS NFR MAR MANUAL: 0 % (ref 0–1)
BASOPHILS NFR MAR MANUAL: 1 % (ref 0–1)
BILIRUB DIRECT SERPL-MCNC: 0.52 MG/DL (ref 0–0.2)
BILIRUB SERPL-MCNC: 1.28 MG/DL (ref 0.2–1)
BILIRUB SERPL-MCNC: 1.83 MG/DL (ref 0.2–1)
BILIRUB SERPL-MCNC: 1.87 MG/DL (ref 0.2–1)
BILIRUB SERPL-MCNC: 2.02 MG/DL (ref 0.2–1)
BILIRUB SERPL-MCNC: 2.26 MG/DL (ref 0.2–1)
BILIRUB SERPL-MCNC: 2.48 MG/DL (ref 0.2–1)
BILIRUB SERPL-MCNC: 2.58 MG/DL (ref 0.2–1)
BILIRUB UR QL STRIP: NEGATIVE
BLD GP AB SCN SERPL QL: NEGATIVE
BLD GP AB SCN SERPL QL: NEGATIVE
BPU ID: NORMAL
BUN SERPL-MCNC: 25 MG/DL (ref 5–25)
BUN SERPL-MCNC: 27 MG/DL (ref 5–25)
BUN SERPL-MCNC: 29 MG/DL (ref 5–25)
BUN SERPL-MCNC: 33 MG/DL (ref 5–25)
BUN SERPL-MCNC: 33 MG/DL (ref 5–25)
BUN SERPL-MCNC: 36 MG/DL (ref 5–25)
BUN SERPL-MCNC: 36 MG/DL (ref 5–25)
BUN SERPL-MCNC: 41 MG/DL (ref 5–25)
BUN SERPL-MCNC: 41 MG/DL (ref 5–25)
BUN SERPL-MCNC: 42 MG/DL (ref 5–25)
BUN SERPL-MCNC: 43 MG/DL (ref 5–25)
BUN SERPL-MCNC: 44 MG/DL (ref 5–25)
BUN SERPL-MCNC: 44 MG/DL (ref 5–25)
BUN SERPL-MCNC: 46 MG/DL (ref 5–25)
BUN SERPL-MCNC: 47 MG/DL (ref 5–25)
BUN SERPL-MCNC: 48 MG/DL (ref 5–25)
BUN SERPL-MCNC: 53 MG/DL (ref 5–25)
BURR CELLS BLD QL SMEAR: PRESENT
CA-I BLD-SCNC: 1.14 MMOL/L (ref 1.12–1.32)
CALCIUM ALBUM COR SERPL-MCNC: 10.3 MG/DL (ref 8.3–10.1)
CALCIUM ALBUM COR SERPL-MCNC: 9.2 MG/DL (ref 8.3–10.1)
CALCIUM ALBUM COR SERPL-MCNC: 9.5 MG/DL (ref 8.3–10.1)
CALCIUM ALBUM COR SERPL-MCNC: 9.7 MG/DL (ref 8.3–10.1)
CALCIUM ALBUM COR SERPL-MCNC: 9.8 MG/DL (ref 8.3–10.1)
CALCIUM ALBUM COR SERPL-MCNC: 9.9 MG/DL (ref 8.3–10.1)
CALCIUM SERPL-MCNC: 8.2 MG/DL (ref 8.3–10.1)
CALCIUM SERPL-MCNC: 8.3 MG/DL (ref 8.3–10.1)
CALCIUM SERPL-MCNC: 8.4 MG/DL (ref 8.3–10.1)
CALCIUM SERPL-MCNC: 8.4 MG/DL (ref 8.3–10.1)
CALCIUM SERPL-MCNC: 8.5 MG/DL (ref 8.3–10.1)
CALCIUM SERPL-MCNC: 8.5 MG/DL (ref 8.3–10.1)
CALCIUM SERPL-MCNC: 8.6 MG/DL (ref 8.3–10.1)
CALCIUM SERPL-MCNC: 8.8 MG/DL (ref 8.3–10.1)
CALCIUM SERPL-MCNC: 8.9 MG/DL (ref 8.3–10.1)
CALCIUM SERPL-MCNC: 8.9 MG/DL (ref 8.3–10.1)
CALCIUM SERPL-MCNC: 9 MG/DL (ref 8.3–10.1)
CHLORIDE SERPL-SCNC: 101 MMOL/L (ref 100–108)
CHLORIDE SERPL-SCNC: 101 MMOL/L (ref 100–108)
CHLORIDE SERPL-SCNC: 103 MMOL/L (ref 100–108)
CHLORIDE SERPL-SCNC: 104 MMOL/L (ref 100–108)
CHLORIDE SERPL-SCNC: 105 MMOL/L (ref 100–108)
CHLORIDE SERPL-SCNC: 106 MMOL/L (ref 100–108)
CHLORIDE SERPL-SCNC: 107 MMOL/L (ref 100–108)
CHLORIDE SERPL-SCNC: 108 MMOL/L (ref 100–108)
CHLORIDE SERPL-SCNC: 109 MMOL/L (ref 100–108)
CHLORIDE SERPL-SCNC: 110 MMOL/L (ref 100–108)
CK MB SERPL-MCNC: 1.5 NG/ML (ref 0–5)
CK MB SERPL-MCNC: <1 % (ref 0–2.5)
CK SERPL-CCNC: 477 U/L (ref 39–308)
CLARITY UR: CLEAR
CO2 SERPL-SCNC: 20 MMOL/L (ref 21–32)
CO2 SERPL-SCNC: 22 MMOL/L (ref 21–32)
CO2 SERPL-SCNC: 24 MMOL/L (ref 21–32)
CO2 SERPL-SCNC: 26 MMOL/L (ref 21–32)
CO2 SERPL-SCNC: 26 MMOL/L (ref 21–32)
CO2 SERPL-SCNC: 27 MMOL/L (ref 21–32)
CO2 SERPL-SCNC: 27 MMOL/L (ref 21–32)
CO2 SERPL-SCNC: 28 MMOL/L (ref 21–32)
CO2 SERPL-SCNC: 29 MMOL/L (ref 21–32)
CO2 SERPL-SCNC: 30 MMOL/L (ref 21–32)
CO2 SERPL-SCNC: 31 MMOL/L (ref 21–32)
COLOR UR: YELLOW
CREAT SERPL-MCNC: 0.84 MG/DL (ref 0.6–1.3)
CREAT SERPL-MCNC: 0.89 MG/DL (ref 0.6–1.3)
CREAT SERPL-MCNC: 0.95 MG/DL (ref 0.6–1.3)
CREAT SERPL-MCNC: 0.97 MG/DL (ref 0.6–1.3)
CREAT SERPL-MCNC: 0.98 MG/DL (ref 0.6–1.3)
CREAT SERPL-MCNC: 0.99 MG/DL (ref 0.6–1.3)
CREAT SERPL-MCNC: 1 MG/DL (ref 0.6–1.3)
CREAT SERPL-MCNC: 1.01 MG/DL (ref 0.6–1.3)
CREAT SERPL-MCNC: 1.01 MG/DL (ref 0.6–1.3)
CREAT SERPL-MCNC: 1.04 MG/DL (ref 0.6–1.3)
CREAT SERPL-MCNC: 1.04 MG/DL (ref 0.6–1.3)
CREAT SERPL-MCNC: 1.09 MG/DL (ref 0.6–1.3)
CREAT SERPL-MCNC: 1.13 MG/DL (ref 0.6–1.3)
CREAT SERPL-MCNC: 1.19 MG/DL (ref 0.6–1.3)
CREAT SERPL-MCNC: 1.24 MG/DL (ref 0.6–1.3)
CREAT SERPL-MCNC: 1.3 MG/DL (ref 0.6–1.3)
CREAT SERPL-MCNC: 1.42 MG/DL (ref 0.6–1.3)
CRP SERPL QL: 123.8 MG/L
CRP SERPL QL: 126.4 MG/L
CRP SERPL QL: 135.5 MG/L
CRP SERPL QL: 183.5 MG/L
CRP SERPL QL: 42.8 MG/L
CRP SERPL QL: 43.8 MG/L
CRP SERPL QL: 65.2 MG/L
CRP SERPL QL: 65.5 MG/L
CRP SERPL QL: 9.1 MG/L
CRP SERPL QL: <3 MG/L
CRP SERPL QL: <3 MG/L
D DIMER PPP FEU-MCNC: 0.42 UG/ML FEU
D DIMER PPP FEU-MCNC: 0.64 UG/ML FEU
D DIMER PPP FEU-MCNC: 0.85 UG/ML FEU
D DIMER PPP FEU-MCNC: 10.49 UG/ML FEU
D DIMER PPP FEU-MCNC: 11.33 UG/ML FEU
D DIMER PPP FEU-MCNC: 2.62 UG/ML FEU
D DIMER PPP FEU-MCNC: 3.6 UG/ML FEU
D DIMER PPP FEU-MCNC: 3.79 UG/ML FEU
D DIMER PPP FEU-MCNC: 3.97 UG/ML FEU
D DIMER PPP FEU-MCNC: 7.17 UG/ML FEU
D DIMER PPP FEU-MCNC: 7.5 UG/ML FEU
DS:DELIVERY SYSTEM: AC
EOSINOPHIL # BLD AUTO: 0 THOUSAND/ΜL (ref 0–0.61)
EOSINOPHIL # BLD AUTO: 0.04 THOUSAND/ΜL (ref 0–0.61)
EOSINOPHIL # BLD AUTO: 0.11 THOUSAND/ΜL (ref 0–0.61)
EOSINOPHIL # BLD MANUAL: 0 THOUSAND/UL (ref 0–0.4)
EOSINOPHIL # BLD MANUAL: 0.09 THOUSAND/UL (ref 0–0.4)
EOSINOPHIL # BLD MANUAL: 0.1 THOUSAND/UL (ref 0–0.4)
EOSINOPHIL NFR BLD AUTO: 0 % (ref 0–6)
EOSINOPHIL NFR BLD AUTO: 1 % (ref 0–6)
EOSINOPHIL NFR BLD MANUAL: 0 % (ref 0–6)
EOSINOPHIL NFR BLD MANUAL: 1 % (ref 0–6)
EOSINOPHIL NFR BLD MANUAL: 1 % (ref 0–6)
ERYTHROCYTE [DISTWIDTH] IN BLOOD BY AUTOMATED COUNT: 14.5 % (ref 11.6–15.1)
ERYTHROCYTE [DISTWIDTH] IN BLOOD BY AUTOMATED COUNT: 14.6 % (ref 11.6–15.1)
ERYTHROCYTE [DISTWIDTH] IN BLOOD BY AUTOMATED COUNT: 14.7 % (ref 11.6–15.1)
ERYTHROCYTE [DISTWIDTH] IN BLOOD BY AUTOMATED COUNT: 14.8 % (ref 11.6–15.1)
ERYTHROCYTE [DISTWIDTH] IN BLOOD BY AUTOMATED COUNT: 15.1 % (ref 11.6–15.1)
ERYTHROCYTE [DISTWIDTH] IN BLOOD BY AUTOMATED COUNT: 15.2 % (ref 11.6–15.1)
ERYTHROCYTE [DISTWIDTH] IN BLOOD BY AUTOMATED COUNT: 15.5 % (ref 11.6–15.1)
ERYTHROCYTE [DISTWIDTH] IN BLOOD BY AUTOMATED COUNT: 15.7 % (ref 11.6–15.1)
ERYTHROCYTE [DISTWIDTH] IN BLOOD BY AUTOMATED COUNT: 16.9 % (ref 11.6–15.1)
FERRITIN SERPL-MCNC: 1966 NG/ML (ref 8–388)
FERRITIN SERPL-MCNC: 2175 NG/ML (ref 8–388)
FERRITIN SERPL-MCNC: 2320 NG/ML (ref 8–388)
FERRITIN SERPL-MCNC: 2512 NG/ML (ref 8–388)
FERRITIN SERPL-MCNC: 2784 NG/ML (ref 8–388)
FERRITIN SERPL-MCNC: 3656 NG/ML (ref 8–388)
FERRITIN SERPL-MCNC: 3689 NG/ML (ref 8–388)
FERRITIN SERPL-MCNC: 3857 NG/ML (ref 8–388)
FERRITIN SERPL-MCNC: 5406 NG/ML (ref 8–388)
FERRITIN SERPL-MCNC: 5429 NG/ML (ref 8–388)
FIO2 GAS DIL.REBREATH: 100 L
FLUAV RNA RESP QL NAA+PROBE: NEGATIVE
FLUBV RNA RESP QL NAA+PROBE: NEGATIVE
GFR SERPL CREATININE-BSD FRML MDRD: 49 ML/MIN/1.73SQ M
GFR SERPL CREATININE-BSD FRML MDRD: 54 ML/MIN/1.73SQ M
GFR SERPL CREATININE-BSD FRML MDRD: 57 ML/MIN/1.73SQ M
GFR SERPL CREATININE-BSD FRML MDRD: 60 ML/MIN/1.73SQ M
GFR SERPL CREATININE-BSD FRML MDRD: 64 ML/MIN/1.73SQ M
GFR SERPL CREATININE-BSD FRML MDRD: 67 ML/MIN/1.73SQ M
GFR SERPL CREATININE-BSD FRML MDRD: 71 ML/MIN/1.73SQ M
GFR SERPL CREATININE-BSD FRML MDRD: 71 ML/MIN/1.73SQ M
GFR SERPL CREATININE-BSD FRML MDRD: 73 ML/MIN/1.73SQ M
GFR SERPL CREATININE-BSD FRML MDRD: 73 ML/MIN/1.73SQ M
GFR SERPL CREATININE-BSD FRML MDRD: 74 ML/MIN/1.73SQ M
GFR SERPL CREATININE-BSD FRML MDRD: 75 ML/MIN/1.73SQ M
GFR SERPL CREATININE-BSD FRML MDRD: 76 ML/MIN/1.73SQ M
GFR SERPL CREATININE-BSD FRML MDRD: 77 ML/MIN/1.73SQ M
GFR SERPL CREATININE-BSD FRML MDRD: 79 ML/MIN/1.73SQ M
GFR SERPL CREATININE-BSD FRML MDRD: 85 ML/MIN/1.73SQ M
GFR SERPL CREATININE-BSD FRML MDRD: 87 ML/MIN/1.73SQ M
GLUCOSE SERPL-MCNC: 105 MG/DL (ref 65–140)
GLUCOSE SERPL-MCNC: 108 MG/DL (ref 65–140)
GLUCOSE SERPL-MCNC: 109 MG/DL (ref 65–140)
GLUCOSE SERPL-MCNC: 113 MG/DL (ref 65–140)
GLUCOSE SERPL-MCNC: 116 MG/DL (ref 65–140)
GLUCOSE SERPL-MCNC: 118 MG/DL (ref 65–140)
GLUCOSE SERPL-MCNC: 119 MG/DL (ref 65–140)
GLUCOSE SERPL-MCNC: 121 MG/DL (ref 65–140)
GLUCOSE SERPL-MCNC: 126 MG/DL (ref 65–140)
GLUCOSE SERPL-MCNC: 128 MG/DL (ref 65–140)
GLUCOSE SERPL-MCNC: 128 MG/DL (ref 65–140)
GLUCOSE SERPL-MCNC: 138 MG/DL (ref 65–140)
GLUCOSE SERPL-MCNC: 158 MG/DL (ref 65–140)
GLUCOSE SERPL-MCNC: 169 MG/DL (ref 65–140)
GLUCOSE SERPL-MCNC: 170 MG/DL (ref 65–140)
GLUCOSE SERPL-MCNC: 181 MG/DL (ref 65–140)
GLUCOSE SERPL-MCNC: 184 MG/DL (ref 65–140)
GLUCOSE SERPL-MCNC: 66 MG/DL (ref 65–140)
GLUCOSE SERPL-MCNC: 87 MG/DL (ref 65–140)
GLUCOSE SERPL-MCNC: 96 MG/DL (ref 65–140)
GLUCOSE UR STRIP-MCNC: NEGATIVE MG/DL
HBV CORE AB SER QL: NORMAL
HBV CORE IGM SER QL: NORMAL
HBV SURFACE AG SER QL: NORMAL
HCO3 BLDA-SCNC: 16.4 MMOL/L (ref 22–28)
HCO3 BLDA-SCNC: 19.5 MMOL/L (ref 22–28)
HCO3 BLDA-SCNC: 26.4 MMOL/L (ref 22–28)
HCT VFR BLD AUTO: 43 % (ref 36.5–49.3)
HCT VFR BLD AUTO: 43.5 % (ref 36.5–49.3)
HCT VFR BLD AUTO: 44.8 % (ref 36.5–49.3)
HCT VFR BLD AUTO: 45.2 % (ref 36.5–49.3)
HCT VFR BLD AUTO: 46.9 % (ref 36.5–49.3)
HCT VFR BLD AUTO: 48.6 % (ref 36.5–49.3)
HCT VFR BLD AUTO: 48.8 % (ref 36.5–49.3)
HCT VFR BLD AUTO: 49 % (ref 36.5–49.3)
HCT VFR BLD AUTO: 49.2 % (ref 36.5–49.3)
HCT VFR BLD AUTO: 50.3 % (ref 36.5–49.3)
HCT VFR BLD AUTO: 51.1 % (ref 36.5–49.3)
HCT VFR BLD AUTO: 52.4 % (ref 36.5–49.3)
HCT VFR BLD AUTO: 52.7 % (ref 36.5–49.3)
HCT VFR BLD AUTO: 53.2 % (ref 36.5–49.3)
HCT VFR BLD AUTO: 53.2 % (ref 36.5–49.3)
HCT VFR BLD AUTO: 54.9 % (ref 36.5–49.3)
HCT VFR BLD CALC: 36 % (ref 36.5–49.3)
HCT VFR BLD CALC: 44 % (ref 36.5–49.3)
HCT VFR BLD CALC: 50 % (ref 36.5–49.3)
HCV AB SER QL: NORMAL
HGB BLD-MCNC: 13.4 G/DL (ref 12–17)
HGB BLD-MCNC: 14.2 G/DL (ref 12–17)
HGB BLD-MCNC: 14.4 G/DL (ref 12–17)
HGB BLD-MCNC: 14.4 G/DL (ref 12–17)
HGB BLD-MCNC: 15.7 G/DL (ref 12–17)
HGB BLD-MCNC: 15.8 G/DL (ref 12–17)
HGB BLD-MCNC: 16.3 G/DL (ref 12–17)
HGB BLD-MCNC: 16.4 G/DL (ref 12–17)
HGB BLD-MCNC: 17 G/DL (ref 12–17)
HGB BLD-MCNC: 17.1 G/DL (ref 12–17)
HGB BLD-MCNC: 17.1 G/DL (ref 12–17)
HGB BLD-MCNC: 17.2 G/DL (ref 12–17)
HGB BLD-MCNC: 17.2 G/DL (ref 12–17)
HGB BLD-MCNC: 17.6 G/DL (ref 12–17)
HGB BLDA-MCNC: 12.2 G/DL (ref 12–17)
HGB BLDA-MCNC: 15 G/DL (ref 12–17)
HGB BLDA-MCNC: 17 G/DL (ref 12–17)
HGB UR QL STRIP.AUTO: NEGATIVE
HIV 1+2 AB+HIV1 P24 AG SERPL QL IA: NORMAL
HIV1 P24 AG SER QL: NORMAL
IMM GRANULOCYTES # BLD AUTO: 0.03 THOUSAND/UL (ref 0–0.2)
IMM GRANULOCYTES # BLD AUTO: 0.05 THOUSAND/UL (ref 0–0.2)
IMM GRANULOCYTES # BLD AUTO: 0.06 THOUSAND/UL (ref 0–0.2)
IMM GRANULOCYTES # BLD AUTO: 0.08 THOUSAND/UL (ref 0–0.2)
IMM GRANULOCYTES # BLD AUTO: 0.17 THOUSAND/UL (ref 0–0.2)
IMM GRANULOCYTES # BLD AUTO: 0.21 THOUSAND/UL (ref 0–0.2)
IMM GRANULOCYTES # BLD AUTO: 0.24 THOUSAND/UL (ref 0–0.2)
IMM GRANULOCYTES # BLD AUTO: 0.3 THOUSAND/UL (ref 0–0.2)
IMM GRANULOCYTES NFR BLD AUTO: 1 % (ref 0–2)
IMM GRANULOCYTES NFR BLD AUTO: 2 % (ref 0–2)
INR PPP: 1.1 (ref 0.84–1.19)
INR PPP: 2.57 (ref 0.84–1.19)
INR PPP: 2.83 (ref 0.84–1.19)
INR PPP: 2.86 (ref 0.84–1.19)
INR PPP: 3.7 (ref 0.84–1.19)
INR PPP: 3.9 (ref 0.84–1.19)
INR PPP: 4.14 (ref 0.84–1.19)
INR PPP: 4.52 (ref 0.84–1.19)
KETONES UR STRIP-MCNC: NEGATIVE MG/DL
LACTATE SERPL-SCNC: 1.2 MMOL/L (ref 0.5–2)
LACTATE SERPL-SCNC: 8.3 MMOL/L (ref 0.5–2)
LACTATE SERPL-SCNC: 8.4 MMOL/L (ref 0.5–2)
LEUKOCYTE ESTERASE UR QL STRIP: NEGATIVE
LYMPHOCYTES # BLD AUTO: 0.22 THOUSAND/UL (ref 0.6–4.47)
LYMPHOCYTES # BLD AUTO: 0.23 THOUSANDS/ΜL (ref 0.6–4.47)
LYMPHOCYTES # BLD AUTO: 0.26 THOUSANDS/ΜL (ref 0.6–4.47)
LYMPHOCYTES # BLD AUTO: 0.33 THOUSANDS/ΜL (ref 0.6–4.47)
LYMPHOCYTES # BLD AUTO: 0.36 THOUSANDS/ΜL (ref 0.6–4.47)
LYMPHOCYTES # BLD AUTO: 0.38 THOUSAND/UL (ref 0.6–4.47)
LYMPHOCYTES # BLD AUTO: 0.39 THOUSANDS/ΜL (ref 0.6–4.47)
LYMPHOCYTES # BLD AUTO: 0.4 THOUSAND/UL (ref 0.6–4.47)
LYMPHOCYTES # BLD AUTO: 0.48 THOUSAND/UL (ref 0.6–4.47)
LYMPHOCYTES # BLD AUTO: 0.49 THOUSANDS/ΜL (ref 0.6–4.47)
LYMPHOCYTES # BLD AUTO: 0.58 THOUSAND/UL (ref 0.6–4.47)
LYMPHOCYTES # BLD AUTO: 0.61 THOUSANDS/ΜL (ref 0.6–4.47)
LYMPHOCYTES # BLD AUTO: 0.62 THOUSAND/UL (ref 0.6–4.47)
LYMPHOCYTES # BLD AUTO: 0.62 THOUSANDS/ΜL (ref 0.6–4.47)
LYMPHOCYTES # BLD AUTO: 0.77 THOUSAND/UL (ref 0.6–4.47)
LYMPHOCYTES # BLD AUTO: 2 % (ref 14–44)
LYMPHOCYTES # BLD AUTO: 2 % (ref 14–44)
LYMPHOCYTES # BLD AUTO: 4 % (ref 14–44)
LYMPHOCYTES # BLD AUTO: 5 % (ref 14–44)
LYMPHOCYTES # BLD AUTO: 8 % (ref 14–44)
LYMPHOCYTES NFR BLD AUTO: 2 % (ref 14–44)
LYMPHOCYTES NFR BLD AUTO: 4 % (ref 14–44)
LYMPHOCYTES NFR BLD AUTO: 5 % (ref 14–44)
LYMPHOCYTES NFR BLD AUTO: 6 % (ref 14–44)
LYMPHOCYTES NFR BLD AUTO: 6 % (ref 14–44)
LYMPHOCYTES NFR BLD AUTO: 8 % (ref 14–44)
MAGNESIUM SERPL-MCNC: 2.2 MG/DL (ref 1.6–2.6)
MAGNESIUM SERPL-MCNC: 2.2 MG/DL (ref 1.6–2.6)
MAGNESIUM SERPL-MCNC: 2.3 MG/DL (ref 1.6–2.6)
MAGNESIUM SERPL-MCNC: 2.4 MG/DL (ref 1.6–2.6)
MAGNESIUM SERPL-MCNC: 2.5 MG/DL (ref 1.6–2.6)
MAGNESIUM SERPL-MCNC: 2.5 MG/DL (ref 1.6–2.6)
MCH RBC QN AUTO: 29.1 PG (ref 26.8–34.3)
MCH RBC QN AUTO: 29.3 PG (ref 26.8–34.3)
MCH RBC QN AUTO: 29.3 PG (ref 26.8–34.3)
MCH RBC QN AUTO: 29.4 PG (ref 26.8–34.3)
MCH RBC QN AUTO: 29.4 PG (ref 26.8–34.3)
MCH RBC QN AUTO: 29.5 PG (ref 26.8–34.3)
MCH RBC QN AUTO: 29.5 PG (ref 26.8–34.3)
MCH RBC QN AUTO: 29.6 PG (ref 26.8–34.3)
MCH RBC QN AUTO: 29.7 PG (ref 26.8–34.3)
MCH RBC QN AUTO: 29.7 PG (ref 26.8–34.3)
MCH RBC QN AUTO: 29.8 PG (ref 26.8–34.3)
MCH RBC QN AUTO: 30 PG (ref 26.8–34.3)
MCH RBC QN AUTO: 30.3 PG (ref 26.8–34.3)
MCH RBC QN AUTO: 30.7 PG (ref 26.8–34.3)
MCHC RBC AUTO-ENTMCNC: 31.2 G/DL (ref 31.4–37.4)
MCHC RBC AUTO-ENTMCNC: 31.9 G/DL (ref 31.4–37.4)
MCHC RBC AUTO-ENTMCNC: 32 G/DL (ref 31.4–37.4)
MCHC RBC AUTO-ENTMCNC: 32.1 G/DL (ref 31.4–37.4)
MCHC RBC AUTO-ENTMCNC: 32.3 G/DL (ref 31.4–37.4)
MCHC RBC AUTO-ENTMCNC: 32.3 G/DL (ref 31.4–37.4)
MCHC RBC AUTO-ENTMCNC: 32.4 G/DL (ref 31.4–37.4)
MCHC RBC AUTO-ENTMCNC: 32.5 G/DL (ref 31.4–37.4)
MCHC RBC AUTO-ENTMCNC: 32.6 G/DL (ref 31.4–37.4)
MCHC RBC AUTO-ENTMCNC: 32.6 G/DL (ref 31.4–37.4)
MCHC RBC AUTO-ENTMCNC: 32.8 G/DL (ref 31.4–37.4)
MCHC RBC AUTO-ENTMCNC: 33.1 G/DL (ref 31.4–37.4)
MCHC RBC AUTO-ENTMCNC: 33.5 G/DL (ref 31.4–37.4)
MCHC RBC AUTO-ENTMCNC: 33.7 G/DL (ref 31.4–37.4)
MCV RBC AUTO: 90 FL (ref 82–98)
MCV RBC AUTO: 91 FL (ref 82–98)
MCV RBC AUTO: 92 FL (ref 82–98)
MCV RBC AUTO: 96 FL (ref 82–98)
MONOCYTES # BLD AUTO: 0 THOUSAND/UL (ref 0–1.22)
MONOCYTES # BLD AUTO: 0 THOUSAND/UL (ref 0–1.22)
MONOCYTES # BLD AUTO: 0.1 THOUSAND/UL (ref 0–1.22)
MONOCYTES # BLD AUTO: 0.19 THOUSAND/UL (ref 0–1.22)
MONOCYTES # BLD AUTO: 0.2 THOUSAND/UL (ref 0–1.22)
MONOCYTES # BLD AUTO: 0.34 THOUSAND/ΜL (ref 0.17–1.22)
MONOCYTES # BLD AUTO: 0.35 THOUSAND/ΜL (ref 0.17–1.22)
MONOCYTES # BLD AUTO: 0.36 THOUSAND/ΜL (ref 0.17–1.22)
MONOCYTES # BLD AUTO: 0.36 THOUSAND/ΜL (ref 0.17–1.22)
MONOCYTES # BLD AUTO: 0.38 THOUSAND/ΜL (ref 0.17–1.22)
MONOCYTES # BLD AUTO: 0.43 THOUSAND/ΜL (ref 0.17–1.22)
MONOCYTES # BLD AUTO: 0.49 THOUSAND/UL (ref 0–1.22)
MONOCYTES # BLD AUTO: 0.51 THOUSAND/ΜL (ref 0.17–1.22)
MONOCYTES # BLD AUTO: 0.64 THOUSAND/ΜL (ref 0.17–1.22)
MONOCYTES # BLD AUTO: 0.81 THOUSAND/UL (ref 0–1.22)
MONOCYTES NFR BLD AUTO: 2 % (ref 4–12)
MONOCYTES NFR BLD AUTO: 3 % (ref 4–12)
MONOCYTES NFR BLD AUTO: 5 % (ref 4–12)
MONOCYTES NFR BLD AUTO: 5 % (ref 4–12)
MONOCYTES NFR BLD AUTO: 6 % (ref 4–12)
MONOCYTES NFR BLD AUTO: 7 % (ref 4–12)
MONOCYTES NFR BLD: 0 % (ref 4–12)
MONOCYTES NFR BLD: 0 % (ref 4–12)
MONOCYTES NFR BLD: 1 % (ref 4–12)
MONOCYTES NFR BLD: 1 % (ref 4–12)
MONOCYTES NFR BLD: 2 % (ref 4–12)
MONOCYTES NFR BLD: 4 % (ref 4–12)
MONOCYTES NFR BLD: 7 % (ref 4–12)
NEUTROPHILS # BLD AUTO: 10.08 THOUSANDS/ΜL (ref 1.85–7.62)
NEUTROPHILS # BLD AUTO: 10.16 THOUSANDS/ΜL (ref 1.85–7.62)
NEUTROPHILS # BLD AUTO: 12.4 THOUSANDS/ΜL (ref 1.85–7.62)
NEUTROPHILS # BLD AUTO: 13.38 THOUSANDS/ΜL (ref 1.85–7.62)
NEUTROPHILS # BLD AUTO: 15.84 THOUSANDS/ΜL (ref 1.85–7.62)
NEUTROPHILS # BLD AUTO: 5.23 THOUSANDS/ΜL (ref 1.85–7.62)
NEUTROPHILS # BLD AUTO: 5.27 THOUSANDS/ΜL (ref 1.85–7.62)
NEUTROPHILS # BLD AUTO: 6.16 THOUSANDS/ΜL (ref 1.85–7.62)
NEUTROPHILS # BLD MANUAL: 10.06 THOUSAND/UL (ref 1.85–7.62)
NEUTROPHILS # BLD MANUAL: 10.95 THOUSAND/UL (ref 1.85–7.62)
NEUTROPHILS # BLD MANUAL: 11.19 THOUSAND/UL (ref 1.85–7.62)
NEUTROPHILS # BLD MANUAL: 19.34 THOUSAND/UL (ref 1.85–7.62)
NEUTROPHILS # BLD MANUAL: 8.55 THOUSAND/UL (ref 1.85–7.62)
NEUTROPHILS # BLD MANUAL: 8.71 THOUSAND/UL (ref 1.85–7.62)
NEUTROPHILS # BLD MANUAL: 9.09 THOUSAND/UL (ref 1.85–7.62)
NEUTS BAND NFR BLD MANUAL: 18 % (ref 0–8)
NEUTS BAND NFR BLD MANUAL: 2 % (ref 0–8)
NEUTS BAND NFR BLD MANUAL: 2 % (ref 0–8)
NEUTS BAND NFR BLD MANUAL: 4 % (ref 0–8)
NEUTS BAND NFR BLD MANUAL: 5 % (ref 0–8)
NEUTS BAND NFR BLD MANUAL: 5 % (ref 0–8)
NEUTS SEG NFR BLD AUTO: 73 % (ref 43–75)
NEUTS SEG NFR BLD AUTO: 84 % (ref 43–75)
NEUTS SEG NFR BLD AUTO: 85 % (ref 43–75)
NEUTS SEG NFR BLD AUTO: 86 % (ref 43–75)
NEUTS SEG NFR BLD AUTO: 86 % (ref 43–75)
NEUTS SEG NFR BLD AUTO: 87 % (ref 43–75)
NEUTS SEG NFR BLD AUTO: 88 % (ref 43–75)
NEUTS SEG NFR BLD AUTO: 89 % (ref 43–75)
NEUTS SEG NFR BLD AUTO: 90 % (ref 43–75)
NEUTS SEG NFR BLD AUTO: 91 % (ref 43–75)
NEUTS SEG NFR BLD AUTO: 91 % (ref 43–75)
NEUTS SEG NFR BLD AUTO: 94 % (ref 43–75)
NEUTS SEG NFR BLD AUTO: 95 % (ref 43–75)
NEUTS SEG NFR BLD AUTO: 95 % (ref 43–75)
NEUTS SEG NFR BLD AUTO: 98 % (ref 43–75)
NITRITE UR QL STRIP: NEGATIVE
NRBC BLD AUTO-RTO: 0 /100 WBCS
NT-PROBNP SERPL-MCNC: 1000 PG/ML
OVALOCYTES BLD QL SMEAR: PRESENT
P AXIS: 238 DEGREES
PCO2 BLD: 18 MMOL/L (ref 21–32)
PCO2 BLD: 20 MMOL/L (ref 21–32)
PCO2 BLD: 28 MMOL/L (ref 21–32)
PCO2 BLD: 29.1 MM HG (ref 36–44)
PCO2 BLD: 44 MM HG (ref 36–44)
PCO2 BLD: 61.6 MM HG (ref 36–44)
PH BLD: 7.18 [PH] (ref 7.35–7.45)
PH BLD: 7.24 [PH] (ref 7.35–7.45)
PH BLD: 7.43 [PH] (ref 7.35–7.45)
PH UR STRIP.AUTO: 6.5 [PH]
PHOSPHATE SERPL-MCNC: 2.5 MG/DL (ref 2.3–4.1)
PHOSPHATE SERPL-MCNC: 2.8 MG/DL (ref 2.3–4.1)
PHOSPHATE SERPL-MCNC: 2.9 MG/DL (ref 2.3–4.1)
PHOSPHATE SERPL-MCNC: 3.5 MG/DL (ref 2.3–4.1)
PHOSPHATE SERPL-MCNC: 3.6 MG/DL (ref 2.3–4.1)
PLATELET # BLD AUTO: 135 THOUSANDS/UL (ref 149–390)
PLATELET # BLD AUTO: 137 THOUSANDS/UL (ref 149–390)
PLATELET # BLD AUTO: 145 THOUSANDS/UL (ref 149–390)
PLATELET # BLD AUTO: 153 THOUSANDS/UL (ref 149–390)
PLATELET # BLD AUTO: 160 THOUSANDS/UL (ref 149–390)
PLATELET # BLD AUTO: 167 THOUSANDS/UL (ref 149–390)
PLATELET # BLD AUTO: 167 THOUSANDS/UL (ref 149–390)
PLATELET # BLD AUTO: 168 THOUSANDS/UL (ref 149–390)
PLATELET # BLD AUTO: 175 THOUSANDS/UL (ref 149–390)
PLATELET # BLD AUTO: 211 THOUSANDS/UL (ref 149–390)
PLATELET # BLD AUTO: 219 THOUSANDS/UL (ref 149–390)
PLATELET # BLD AUTO: 267 THOUSANDS/UL (ref 149–390)
PLATELET # BLD AUTO: 300 THOUSANDS/UL (ref 149–390)
PLATELET # BLD AUTO: 313 THOUSANDS/UL (ref 149–390)
PLATELET # BLD AUTO: 314 THOUSANDS/UL (ref 149–390)
PLATELET # BLD AUTO: 315 THOUSANDS/UL (ref 149–390)
PLATELET BLD QL SMEAR: ABNORMAL
PLATELET BLD QL SMEAR: ABNORMAL
PLATELET BLD QL SMEAR: ADEQUATE
PMV BLD AUTO: 8.6 FL (ref 8.9–12.7)
PMV BLD AUTO: 8.7 FL (ref 8.9–12.7)
PMV BLD AUTO: 8.7 FL (ref 8.9–12.7)
PMV BLD AUTO: 8.8 FL (ref 8.9–12.7)
PMV BLD AUTO: 8.8 FL (ref 8.9–12.7)
PMV BLD AUTO: 8.9 FL (ref 8.9–12.7)
PMV BLD AUTO: 9.2 FL (ref 8.9–12.7)
PMV BLD AUTO: 9.3 FL (ref 8.9–12.7)
PMV BLD AUTO: 9.5 FL (ref 8.9–12.7)
PMV BLD AUTO: 9.6 FL (ref 8.9–12.7)
PMV BLD AUTO: 9.7 FL (ref 8.9–12.7)
PMV BLD AUTO: 9.8 FL (ref 8.9–12.7)
PO2 BLD: 43 MM HG (ref 75–129)
PO2 BLD: 60 MM HG (ref 75–129)
PO2 BLD: 66 MM HG (ref 75–129)
POIKILOCYTOSIS BLD QL SMEAR: PRESENT
POTASSIUM BLD-SCNC: 4.1 MMOL/L (ref 3.5–5.3)
POTASSIUM BLD-SCNC: 4.4 MMOL/L (ref 3.5–5.3)
POTASSIUM BLD-SCNC: 4.6 MMOL/L (ref 3.5–5.3)
POTASSIUM SERPL-SCNC: 4.2 MMOL/L (ref 3.5–5.3)
POTASSIUM SERPL-SCNC: 4.2 MMOL/L (ref 3.5–5.3)
POTASSIUM SERPL-SCNC: 4.3 MMOL/L (ref 3.5–5.3)
POTASSIUM SERPL-SCNC: 4.4 MMOL/L (ref 3.5–5.3)
POTASSIUM SERPL-SCNC: 4.4 MMOL/L (ref 3.5–5.3)
POTASSIUM SERPL-SCNC: 4.5 MMOL/L (ref 3.5–5.3)
POTASSIUM SERPL-SCNC: 4.5 MMOL/L (ref 3.5–5.3)
POTASSIUM SERPL-SCNC: 5 MMOL/L (ref 3.5–5.3)
POTASSIUM SERPL-SCNC: 5.1 MMOL/L (ref 3.5–5.3)
POTASSIUM SERPL-SCNC: 5.1 MMOL/L (ref 3.5–5.3)
POTASSIUM SERPL-SCNC: 5.2 MMOL/L (ref 3.5–5.3)
POTASSIUM SERPL-SCNC: 5.2 MMOL/L (ref 3.5–5.3)
POTASSIUM SERPL-SCNC: 5.9 MMOL/L (ref 3.5–5.3)
PR INTERVAL: 104 MS
PRESSURE SETTING: 10
PROCALCITONIN SERPL-MCNC: 0.19 NG/ML
PROCALCITONIN SERPL-MCNC: 0.21 NG/ML
PROT SERPL-MCNC: 5.6 G/DL (ref 6.4–8.2)
PROT SERPL-MCNC: 5.6 G/DL (ref 6.4–8.2)
PROT SERPL-MCNC: 6 G/DL (ref 6.4–8.2)
PROT SERPL-MCNC: 6.5 G/DL (ref 6.4–8.2)
PROT SERPL-MCNC: 7.1 G/DL (ref 6.4–8.2)
PROT UR STRIP-MCNC: NEGATIVE MG/DL
PROTHROMBIN TIME: 14.3 SECONDS (ref 11.6–14.5)
PROTHROMBIN TIME: 27.7 SECONDS (ref 11.6–14.5)
PROTHROMBIN TIME: 29.8 SECONDS (ref 11.6–14.5)
PROTHROMBIN TIME: 30 SECONDS (ref 11.6–14.5)
PROTHROMBIN TIME: 36.7 SECONDS (ref 11.6–14.5)
PROTHROMBIN TIME: 38.2 SECONDS (ref 11.6–14.5)
PROTHROMBIN TIME: 40 SECONDS (ref 11.6–14.5)
PROTHROMBIN TIME: 42.8 SECONDS (ref 11.6–14.5)
QRS AXIS: -2 DEGREES
QRS AXIS: -22 DEGREES
QRS AXIS: -3 DEGREES
QRS AXIS: -9 DEGREES
QRS AXIS: 11 DEGREES
QRS AXIS: 70 DEGREES
QRS AXIS: 8 DEGREES
QRSD INTERVAL: 78 MS
QRSD INTERVAL: 80 MS
QRSD INTERVAL: 86 MS
QRSD INTERVAL: 92 MS
QT INTERVAL: 364 MS
QT INTERVAL: 368 MS
QT INTERVAL: 390 MS
QT INTERVAL: 392 MS
QT INTERVAL: 400 MS
QT INTERVAL: 400 MS
QT INTERVAL: 426 MS
QTC INTERVAL: 430 MS
QTC INTERVAL: 477 MS
QTC INTERVAL: 490 MS
QTC INTERVAL: 503 MS
QTC INTERVAL: 508 MS
QTC INTERVAL: 508 MS
QTC INTERVAL: 541 MS
RBC # BLD AUTO: 4.49 MILLION/UL (ref 3.88–5.62)
RBC # BLD AUTO: 4.79 MILLION/UL (ref 3.88–5.62)
RBC # BLD AUTO: 4.91 MILLION/UL (ref 3.88–5.62)
RBC # BLD AUTO: 4.95 MILLION/UL (ref 3.88–5.62)
RBC # BLD AUTO: 5.22 MILLION/UL (ref 3.88–5.62)
RBC # BLD AUTO: 5.32 MILLION/UL (ref 3.88–5.62)
RBC # BLD AUTO: 5.36 MILLION/UL (ref 3.88–5.62)
RBC # BLD AUTO: 5.38 MILLION/UL (ref 3.88–5.62)
RBC # BLD AUTO: 5.43 MILLION/UL (ref 3.88–5.62)
RBC # BLD AUTO: 5.57 MILLION/UL (ref 3.88–5.62)
RBC # BLD AUTO: 5.59 MILLION/UL (ref 3.88–5.62)
RBC # BLD AUTO: 5.73 MILLION/UL (ref 3.88–5.62)
RBC # BLD AUTO: 5.79 MILLION/UL (ref 3.88–5.62)
RBC # BLD AUTO: 5.81 MILLION/UL (ref 3.88–5.62)
RBC # BLD AUTO: 5.82 MILLION/UL (ref 3.88–5.62)
RBC # BLD AUTO: 5.94 MILLION/UL (ref 3.88–5.62)
RBC MORPH BLD: NORMAL
RBC MORPH BLD: NORMAL
RESPIRATORY RATE: 20
RH BLD: POSITIVE
RH BLD: POSITIVE
RSV RNA RESP QL NAA+PROBE: NEGATIVE
SAMPLE SITE: ABNORMAL
SAMPLE SITE: ABNORMAL
SAO2 % BLD FROM PO2: 81 % (ref 60–85)
SAO2 % BLD FROM PO2: 84 % (ref 60–85)
SAO2 % BLD FROM PO2: 88 % (ref 60–85)
SARS-COV-2 RNA RESP QL NAA+PROBE: POSITIVE
SODIUM BLD-SCNC: 131 MMOL/L (ref 136–145)
SODIUM BLD-SCNC: 138 MMOL/L (ref 136–145)
SODIUM BLD-SCNC: 144 MMOL/L (ref 136–145)
SODIUM SERPL-SCNC: 136 MMOL/L (ref 136–145)
SODIUM SERPL-SCNC: 136 MMOL/L (ref 136–145)
SODIUM SERPL-SCNC: 137 MMOL/L (ref 136–145)
SODIUM SERPL-SCNC: 138 MMOL/L (ref 136–145)
SODIUM SERPL-SCNC: 139 MMOL/L (ref 136–145)
SODIUM SERPL-SCNC: 140 MMOL/L (ref 136–145)
SODIUM SERPL-SCNC: 141 MMOL/L (ref 136–145)
SP GR UR STRIP.AUTO: 1.02 (ref 1–1.03)
SPECIMEN EXPIRATION DATE: NORMAL
SPECIMEN EXPIRATION DATE: NORMAL
SPECIMEN SOURCE: ABNORMAL
T WAVE AXIS: -58 DEGREES
T WAVE AXIS: 158 DEGREES
T WAVE AXIS: 178 DEGREES
T WAVE AXIS: 181 DEGREES
T WAVE AXIS: 194 DEGREES
T WAVE AXIS: 207 DEGREES
T WAVE AXIS: 216 DEGREES
TOTAL CELLS COUNTED SPEC: 100
TROPONIN I SERPL-MCNC: 0.07 NG/ML
TROPONIN I SERPL-MCNC: 0.08 NG/ML
TROPONIN I SERPL-MCNC: 0.08 NG/ML
TROPONIN I SERPL-MCNC: 0.09 NG/ML
TROPONIN I SERPL-MCNC: 0.17 NG/ML
TROPONIN I SERPL-MCNC: 0.17 NG/ML
TSH SERPL DL<=0.05 MIU/L-ACNC: 0.71 UIU/ML (ref 0.36–3.74)
UNIT DISPENSE STATUS: NORMAL
UNIT PRODUCT CODE: NORMAL
UNIT RH: NORMAL
UROBILINOGEN UR QL STRIP.AUTO: 4 E.U./DL
VARIANT LYMPHS # BLD AUTO: 1 %
VARIANT LYMPHS # BLD AUTO: 1 %
VENT TYPE: ABNORMAL
VENTILATION VALUE: 500
VENTRICULAR RATE: 101 BPM
VENTRICULAR RATE: 84 BPM
VENTRICULAR RATE: 95 BPM
VENTRICULAR RATE: 97 BPM
VENTRICULAR RATE: 99 BPM
WBC # BLD AUTO: 10.15 THOUSAND/UL (ref 4.31–10.16)
WBC # BLD AUTO: 10.77 THOUSAND/UL (ref 4.31–10.16)
WBC # BLD AUTO: 11.17 THOUSAND/UL (ref 4.31–10.16)
WBC # BLD AUTO: 11.51 THOUSAND/UL (ref 4.31–10.16)
WBC # BLD AUTO: 11.56 THOUSAND/UL (ref 4.31–10.16)
WBC # BLD AUTO: 12.3 THOUSAND/UL (ref 4.31–10.16)
WBC # BLD AUTO: 13.53 THOUSAND/UL (ref 4.31–10.16)
WBC # BLD AUTO: 14.26 THOUSAND/UL (ref 4.31–10.16)
WBC # BLD AUTO: 17.36 THOUSAND/UL (ref 4.31–10.16)
WBC # BLD AUTO: 19.94 THOUSAND/UL (ref 4.31–10.16)
WBC # BLD AUTO: 6.04 THOUSAND/UL (ref 4.31–10.16)
WBC # BLD AUTO: 6.23 THOUSAND/UL (ref 4.31–10.16)
WBC # BLD AUTO: 6.96 THOUSAND/UL (ref 4.31–10.16)
WBC # BLD AUTO: 9.4 THOUSAND/UL (ref 4.31–10.16)
WBC # BLD AUTO: 9.57 THOUSAND/UL (ref 4.31–10.16)
WBC # BLD AUTO: 9.67 THOUSAND/UL (ref 4.31–10.16)

## 2021-01-01 PROCEDURE — 99233 SBSQ HOSP IP/OBS HIGH 50: CPT | Performed by: INTERNAL MEDICINE

## 2021-01-01 PROCEDURE — 83735 ASSAY OF MAGNESIUM: CPT | Performed by: NURSE PRACTITIONER

## 2021-01-01 PROCEDURE — 94760 N-INVAS EAR/PLS OXIMETRY 1: CPT

## 2021-01-01 PROCEDURE — 84132 ASSAY OF SERUM POTASSIUM: CPT

## 2021-01-01 PROCEDURE — 74018 RADEX ABDOMEN 1 VIEW: CPT

## 2021-01-01 PROCEDURE — 87077 CULTURE AEROBIC IDENTIFY: CPT | Performed by: FAMILY MEDICINE

## 2021-01-01 PROCEDURE — 94762 N-INVAS EAR/PLS OXIMTRY CONT: CPT

## 2021-01-01 PROCEDURE — 80048 BASIC METABOLIC PNL TOTAL CA: CPT | Performed by: PHYSICIAN ASSISTANT

## 2021-01-01 PROCEDURE — 85379 FIBRIN DEGRADATION QUANT: CPT | Performed by: PHYSICIAN ASSISTANT

## 2021-01-01 PROCEDURE — 82728 ASSAY OF FERRITIN: CPT | Performed by: PHYSICIAN ASSISTANT

## 2021-01-01 PROCEDURE — 71045 X-RAY EXAM CHEST 1 VIEW: CPT

## 2021-01-01 PROCEDURE — 93010 ELECTROCARDIOGRAM REPORT: CPT | Performed by: INTERNAL MEDICINE

## 2021-01-01 PROCEDURE — 85027 COMPLETE CBC AUTOMATED: CPT | Performed by: NURSE PRACTITIONER

## 2021-01-01 PROCEDURE — 85025 COMPLETE CBC W/AUTO DIFF WBC: CPT | Performed by: PHYSICIAN ASSISTANT

## 2021-01-01 PROCEDURE — 92950 HEART/LUNG RESUSCITATION CPR: CPT

## 2021-01-01 PROCEDURE — 99232 SBSQ HOSP IP/OBS MODERATE 35: CPT | Performed by: INTERNAL MEDICINE

## 2021-01-01 PROCEDURE — 85610 PROTHROMBIN TIME: CPT | Performed by: PHYSICIAN ASSISTANT

## 2021-01-01 PROCEDURE — 93005 ELECTROCARDIOGRAM TRACING: CPT

## 2021-01-01 PROCEDURE — 84484 ASSAY OF TROPONIN QUANT: CPT | Performed by: PHYSICIAN ASSISTANT

## 2021-01-01 PROCEDURE — 80048 BASIC METABOLIC PNL TOTAL CA: CPT | Performed by: NURSE PRACTITIONER

## 2021-01-01 PROCEDURE — 86900 BLOOD TYPING SEROLOGIC ABO: CPT | Performed by: NURSE PRACTITIONER

## 2021-01-01 PROCEDURE — 80053 COMPREHEN METABOLIC PANEL: CPT | Performed by: NURSE PRACTITIONER

## 2021-01-01 PROCEDURE — 99291 CRITICAL CARE FIRST HOUR: CPT | Performed by: NURSE PRACTITIONER

## 2021-01-01 PROCEDURE — 86901 BLOOD TYPING SEROLOGIC RH(D): CPT | Performed by: FAMILY MEDICINE

## 2021-01-01 PROCEDURE — 99285 EMERGENCY DEPT VISIT HI MDM: CPT

## 2021-01-01 PROCEDURE — 87340 HEPATITIS B SURFACE AG IA: CPT | Performed by: PHYSICIAN ASSISTANT

## 2021-01-01 PROCEDURE — 83735 ASSAY OF MAGNESIUM: CPT | Performed by: PHYSICIAN ASSISTANT

## 2021-01-01 PROCEDURE — 76937 US GUIDE VASCULAR ACCESS: CPT | Performed by: NURSE PRACTITIONER

## 2021-01-01 PROCEDURE — 93970 EXTREMITY STUDY: CPT

## 2021-01-01 PROCEDURE — 99291 CRITICAL CARE FIRST HOUR: CPT | Performed by: INTERNAL MEDICINE

## 2021-01-01 PROCEDURE — 84145 PROCALCITONIN (PCT): CPT | Performed by: PHYSICIAN ASSISTANT

## 2021-01-01 PROCEDURE — 82728 ASSAY OF FERRITIN: CPT | Performed by: NURSE PRACTITIONER

## 2021-01-01 PROCEDURE — 86140 C-REACTIVE PROTEIN: CPT | Performed by: PHYSICIAN ASSISTANT

## 2021-01-01 PROCEDURE — 85007 BL SMEAR W/DIFF WBC COUNT: CPT | Performed by: NURSE PRACTITIONER

## 2021-01-01 PROCEDURE — 85027 COMPLETE CBC AUTOMATED: CPT | Performed by: PHYSICIAN ASSISTANT

## 2021-01-01 PROCEDURE — XW13325 TRANSFUSION OF CONVALESCENT PLASMA (NONAUTOLOGOUS) INTO PERIPHERAL VEIN, PERCUTANEOUS APPROACH, NEW TECHNOLOGY GROUP 5: ICD-10-PCS | Performed by: INTERNAL MEDICINE

## 2021-01-01 PROCEDURE — NC001 PR NO CHARGE: Performed by: NURSE PRACTITIONER

## 2021-01-01 PROCEDURE — 82550 ASSAY OF CK (CPK): CPT | Performed by: PHYSICIAN ASSISTANT

## 2021-01-01 PROCEDURE — 31500 INSERT EMERGENCY AIRWAY: CPT | Performed by: PHYSICIAN ASSISTANT

## 2021-01-01 PROCEDURE — 83605 ASSAY OF LACTIC ACID: CPT | Performed by: NURSE PRACTITIONER

## 2021-01-01 PROCEDURE — 97530 THERAPEUTIC ACTIVITIES: CPT

## 2021-01-01 PROCEDURE — 85610 PROTHROMBIN TIME: CPT | Performed by: NURSE PRACTITIONER

## 2021-01-01 PROCEDURE — 96361 HYDRATE IV INFUSION ADD-ON: CPT

## 2021-01-01 PROCEDURE — 86704 HEP B CORE ANTIBODY TOTAL: CPT | Performed by: PHYSICIAN ASSISTANT

## 2021-01-01 PROCEDURE — 86923 COMPATIBILITY TEST ELECTRIC: CPT

## 2021-01-01 PROCEDURE — 84100 ASSAY OF PHOSPHORUS: CPT | Performed by: FAMILY MEDICINE

## 2021-01-01 PROCEDURE — 85379 FIBRIN DEGRADATION QUANT: CPT | Performed by: FAMILY MEDICINE

## 2021-01-01 PROCEDURE — 80048 BASIC METABOLIC PNL TOTAL CA: CPT | Performed by: INTERNAL MEDICINE

## 2021-01-01 PROCEDURE — 97116 GAIT TRAINING THERAPY: CPT

## 2021-01-01 PROCEDURE — 87081 CULTURE SCREEN ONLY: CPT | Performed by: FAMILY MEDICINE

## 2021-01-01 PROCEDURE — 85025 COMPLETE CBC W/AUTO DIFF WBC: CPT | Performed by: NURSE PRACTITIONER

## 2021-01-01 PROCEDURE — 87040 BLOOD CULTURE FOR BACTERIA: CPT | Performed by: FAMILY MEDICINE

## 2021-01-01 PROCEDURE — 87806 HIV AG W/HIV1&2 ANTB W/OPTIC: CPT | Performed by: PHYSICIAN ASSISTANT

## 2021-01-01 PROCEDURE — 93308 TTE F-UP OR LMTD: CPT | Performed by: INTERNAL MEDICINE

## 2021-01-01 PROCEDURE — 86140 C-REACTIVE PROTEIN: CPT | Performed by: NURSE PRACTITIONER

## 2021-01-01 PROCEDURE — 85730 THROMBOPLASTIN TIME PARTIAL: CPT | Performed by: FAMILY MEDICINE

## 2021-01-01 PROCEDURE — 94003 VENT MGMT INPAT SUBQ DAY: CPT

## 2021-01-01 PROCEDURE — 93970 EXTREMITY STUDY: CPT | Performed by: SURGERY

## 2021-01-01 PROCEDURE — 99223 1ST HOSP IP/OBS HIGH 75: CPT | Performed by: INTERNAL MEDICINE

## 2021-01-01 PROCEDURE — 36556 INSERT NON-TUNNEL CV CATH: CPT | Performed by: NURSE PRACTITIONER

## 2021-01-01 PROCEDURE — 83735 ASSAY OF MAGNESIUM: CPT | Performed by: FAMILY MEDICINE

## 2021-01-01 PROCEDURE — 85730 THROMBOPLASTIN TIME PARTIAL: CPT | Performed by: PHYSICIAN ASSISTANT

## 2021-01-01 PROCEDURE — 99285 EMERGENCY DEPT VISIT HI MDM: CPT | Performed by: PHYSICIAN ASSISTANT

## 2021-01-01 PROCEDURE — 85007 BL SMEAR W/DIFF WBC COUNT: CPT | Performed by: PHYSICIAN ASSISTANT

## 2021-01-01 PROCEDURE — 82728 ASSAY OF FERRITIN: CPT | Performed by: INTERNAL MEDICINE

## 2021-01-01 PROCEDURE — 85007 BL SMEAR W/DIFF WBC COUNT: CPT | Performed by: FAMILY MEDICINE

## 2021-01-01 PROCEDURE — XW033H5 INTRODUCTION OF TOCILIZUMAB INTO PERIPHERAL VEIN, PERCUTANEOUS APPROACH, NEW TECHNOLOGY GROUP 5: ICD-10-PCS | Performed by: INTERNAL MEDICINE

## 2021-01-01 PROCEDURE — 97167 OT EVAL HIGH COMPLEX 60 MIN: CPT

## 2021-01-01 PROCEDURE — 86900 BLOOD TYPING SEROLOGIC ABO: CPT | Performed by: FAMILY MEDICINE

## 2021-01-01 PROCEDURE — 85027 COMPLETE CBC AUTOMATED: CPT | Performed by: FAMILY MEDICINE

## 2021-01-01 PROCEDURE — 86705 HEP B CORE ANTIBODY IGM: CPT | Performed by: PHYSICIAN ASSISTANT

## 2021-01-01 PROCEDURE — 0BH17EZ INSERTION OF ENDOTRACHEAL AIRWAY INTO TRACHEA, VIA NATURAL OR ARTIFICIAL OPENING: ICD-10-PCS | Performed by: INTERNAL MEDICINE

## 2021-01-01 PROCEDURE — 0241U HB NFCT DS VIR RESP RNA 4 TRGT: CPT | Performed by: PHYSICIAN ASSISTANT

## 2021-01-01 PROCEDURE — 86803 HEPATITIS C AB TEST: CPT | Performed by: PHYSICIAN ASSISTANT

## 2021-01-01 PROCEDURE — 99238 HOSP IP/OBS DSCHRG MGMT 30/<: CPT | Performed by: NURSE PRACTITIONER

## 2021-01-01 PROCEDURE — 86140 C-REACTIVE PROTEIN: CPT | Performed by: FAMILY MEDICINE

## 2021-01-01 PROCEDURE — 82553 CREATINE MB FRACTION: CPT | Performed by: PHYSICIAN ASSISTANT

## 2021-01-01 PROCEDURE — 76937 US GUIDE VASCULAR ACCESS: CPT | Performed by: PHYSICIAN ASSISTANT

## 2021-01-01 PROCEDURE — 86850 RBC ANTIBODY SCREEN: CPT | Performed by: NURSE PRACTITIONER

## 2021-01-01 PROCEDURE — 80053 COMPREHEN METABOLIC PANEL: CPT | Performed by: FAMILY MEDICINE

## 2021-01-01 PROCEDURE — 84100 ASSAY OF PHOSPHORUS: CPT | Performed by: PHYSICIAN ASSISTANT

## 2021-01-01 PROCEDURE — 36415 COLL VENOUS BLD VENIPUNCTURE: CPT | Performed by: PHYSICIAN ASSISTANT

## 2021-01-01 PROCEDURE — 94150 VITAL CAPACITY TEST: CPT

## 2021-01-01 PROCEDURE — 30243N1 TRANSFUSION OF NONAUTOLOGOUS RED BLOOD CELLS INTO CENTRAL VEIN, PERCUTANEOUS APPROACH: ICD-10-PCS | Performed by: INTERNAL MEDICINE

## 2021-01-01 PROCEDURE — 84443 ASSAY THYROID STIM HORMONE: CPT | Performed by: PHYSICIAN ASSISTANT

## 2021-01-01 PROCEDURE — 94002 VENT MGMT INPAT INIT DAY: CPT

## 2021-01-01 PROCEDURE — 97163 PT EVAL HIGH COMPLEX 45 MIN: CPT

## 2021-01-01 PROCEDURE — 85610 PROTHROMBIN TIME: CPT | Performed by: FAMILY MEDICINE

## 2021-01-01 PROCEDURE — 83605 ASSAY OF LACTIC ACID: CPT | Performed by: PHYSICIAN ASSISTANT

## 2021-01-01 PROCEDURE — 85014 HEMATOCRIT: CPT

## 2021-01-01 PROCEDURE — 93321 DOPPLER ECHO F-UP/LMTD STD: CPT | Performed by: INTERNAL MEDICINE

## 2021-01-01 PROCEDURE — 96365 THER/PROPH/DIAG IV INF INIT: CPT

## 2021-01-01 PROCEDURE — 97110 THERAPEUTIC EXERCISES: CPT

## 2021-01-01 PROCEDURE — XW033E5 INTRODUCTION OF REMDESIVIR ANTI-INFECTIVE INTO PERIPHERAL VEIN, PERCUTANEOUS APPROACH, NEW TECHNOLOGY GROUP 5: ICD-10-PCS | Performed by: INTERNAL MEDICINE

## 2021-01-01 PROCEDURE — 80053 COMPREHEN METABOLIC PANEL: CPT | Performed by: PHYSICIAN ASSISTANT

## 2021-01-01 PROCEDURE — 86850 RBC ANTIBODY SCREEN: CPT | Performed by: FAMILY MEDICINE

## 2021-01-01 PROCEDURE — 83735 ASSAY OF MAGNESIUM: CPT | Performed by: INTERNAL MEDICINE

## 2021-01-01 PROCEDURE — 84295 ASSAY OF SERUM SODIUM: CPT

## 2021-01-01 PROCEDURE — 80076 HEPATIC FUNCTION PANEL: CPT | Performed by: FAMILY MEDICINE

## 2021-01-01 PROCEDURE — 84100 ASSAY OF PHOSPHORUS: CPT | Performed by: NURSE PRACTITIONER

## 2021-01-01 PROCEDURE — 82803 BLOOD GASES ANY COMBINATION: CPT

## 2021-01-01 PROCEDURE — 82728 ASSAY OF FERRITIN: CPT | Performed by: FAMILY MEDICINE

## 2021-01-01 PROCEDURE — 86901 BLOOD TYPING SEROLOGIC RH(D): CPT | Performed by: NURSE PRACTITIONER

## 2021-01-01 PROCEDURE — 85025 COMPLETE CBC W/AUTO DIFF WBC: CPT | Performed by: INTERNAL MEDICINE

## 2021-01-01 PROCEDURE — 86140 C-REACTIVE PROTEIN: CPT | Performed by: INTERNAL MEDICINE

## 2021-01-01 PROCEDURE — 85379 FIBRIN DEGRADATION QUANT: CPT | Performed by: NURSE PRACTITIONER

## 2021-01-01 PROCEDURE — P9016 RBC LEUKOCYTES REDUCED: HCPCS

## 2021-01-01 PROCEDURE — 93308 TTE F-UP OR LMTD: CPT

## 2021-01-01 PROCEDURE — 36600 WITHDRAWAL OF ARTERIAL BLOOD: CPT

## 2021-01-01 PROCEDURE — 5A1935Z RESPIRATORY VENTILATION, LESS THAN 24 CONSECUTIVE HOURS: ICD-10-PCS | Performed by: INTERNAL MEDICINE

## 2021-01-01 PROCEDURE — 81003 URINALYSIS AUTO W/O SCOPE: CPT | Performed by: FAMILY MEDICINE

## 2021-01-01 PROCEDURE — 93325 DOPPLER ECHO COLOR FLOW MAPG: CPT | Performed by: INTERNAL MEDICINE

## 2021-01-01 PROCEDURE — 84484 ASSAY OF TROPONIN QUANT: CPT | Performed by: NURSE PRACTITIONER

## 2021-01-01 PROCEDURE — 82947 ASSAY GLUCOSE BLOOD QUANT: CPT

## 2021-01-01 PROCEDURE — 06HY33Z INSERTION OF INFUSION DEVICE INTO LOWER VEIN, PERCUTANEOUS APPROACH: ICD-10-PCS | Performed by: INTERNAL MEDICINE

## 2021-01-01 PROCEDURE — 82330 ASSAY OF CALCIUM: CPT | Performed by: PHYSICIAN ASSISTANT

## 2021-01-01 PROCEDURE — 83880 ASSAY OF NATRIURETIC PEPTIDE: CPT | Performed by: PHYSICIAN ASSISTANT

## 2021-01-01 RX ORDER — RISPERIDONE 1 MG/1
1 TABLET, ORALLY DISINTEGRATING ORAL ONCE
Status: COMPLETED | OUTPATIENT
Start: 2021-01-01 | End: 2021-01-01

## 2021-01-01 RX ORDER — WARFARIN SODIUM 5 MG/1
5 TABLET ORAL DAILY
COMMUNITY
Start: 2020-01-01 | End: 2021-01-01 | Stop reason: HOSPADM

## 2021-01-01 RX ORDER — FUROSEMIDE 10 MG/ML
20 INJECTION INTRAMUSCULAR; INTRAVENOUS ONCE
Status: COMPLETED | OUTPATIENT
Start: 2021-01-01 | End: 2021-01-01

## 2021-01-01 RX ORDER — ASCORBIC ACID 500 MG
1000 TABLET ORAL EVERY 12 HOURS SCHEDULED
Status: COMPLETED | OUTPATIENT
Start: 2021-01-01 | End: 2021-01-01

## 2021-01-01 RX ORDER — PHYTONADIONE 10 MG/ML
5 INJECTION, EMULSION INTRAMUSCULAR; INTRAVENOUS; SUBCUTANEOUS ONCE
Status: COMPLETED | OUTPATIENT
Start: 2021-01-01 | End: 2021-01-01

## 2021-01-01 RX ORDER — WARFARIN SODIUM 5 MG/1
5 TABLET ORAL DAILY
Status: DISCONTINUED | OUTPATIENT
Start: 2021-01-01 | End: 2021-01-01

## 2021-01-01 RX ORDER — DEXTROSE 50 % IN WATER 50 %
SYRINGE (ML) INTRAVENOUS CODE/TRAUMA/SEDATION MEDICATION
Status: COMPLETED | OUTPATIENT
Start: 2021-01-01 | End: 2021-01-01

## 2021-01-01 RX ORDER — OLANZAPINE 5 MG/1
2.5 TABLET, ORALLY DISINTEGRATING ORAL
Status: DISCONTINUED | OUTPATIENT
Start: 2021-01-01 | End: 2021-01-01

## 2021-01-01 RX ORDER — ATORVASTATIN CALCIUM 40 MG/1
40 TABLET, FILM COATED ORAL
Status: DISCONTINUED | OUTPATIENT
Start: 2021-01-01 | End: 2021-01-01

## 2021-01-01 RX ORDER — OLANZAPINE 5 MG/1
5 TABLET, ORALLY DISINTEGRATING ORAL ONCE
Status: COMPLETED | OUTPATIENT
Start: 2021-01-01 | End: 2021-01-01

## 2021-01-01 RX ORDER — DEXAMETHASONE SODIUM PHOSPHATE 4 MG/ML
6 INJECTION, SOLUTION INTRA-ARTICULAR; INTRALESIONAL; INTRAMUSCULAR; INTRAVENOUS; SOFT TISSUE EVERY 24 HOURS
Status: DISCONTINUED | OUTPATIENT
Start: 2021-01-01 | End: 2021-01-01

## 2021-01-01 RX ORDER — HEPARIN SODIUM 5000 [USP'U]/ML
5000 INJECTION, SOLUTION INTRAVENOUS; SUBCUTANEOUS EVERY 8 HOURS SCHEDULED
Status: DISCONTINUED | OUTPATIENT
Start: 2021-01-01 | End: 2021-01-01

## 2021-01-01 RX ORDER — PHENYLEPHRINE HYDROCHLORIDE 10 MG/ML
INJECTION INTRAVENOUS
Status: DISCONTINUED
Start: 2021-01-01 | End: 2021-01-01 | Stop reason: HOSPADM

## 2021-01-01 RX ORDER — DEXTROSE MONOHYDRATE 25 G/50ML
25 INJECTION, SOLUTION INTRAVENOUS ONCE
Status: DISCONTINUED | OUTPATIENT
Start: 2021-01-01 | End: 2021-01-01

## 2021-01-01 RX ORDER — LISINOPRIL 20 MG/1
10 TABLET ORAL EVERY EVENING
COMMUNITY
Start: 2021-01-01 | End: 2021-01-01 | Stop reason: HOSPADM

## 2021-01-01 RX ORDER — NOREPINEPHRINE BITARTRATE 1 MG/ML
INJECTION, SOLUTION INTRAVENOUS
Status: DISCONTINUED
Start: 2021-01-01 | End: 2021-01-01 | Stop reason: WASHOUT

## 2021-01-01 RX ORDER — DEXAMETHASONE SODIUM PHOSPHATE 4 MG/ML
0.1 INJECTION, SOLUTION INTRA-ARTICULAR; INTRALESIONAL; INTRAMUSCULAR; INTRAVENOUS; SOFT TISSUE EVERY 24 HOURS
Status: DISCONTINUED | OUTPATIENT
Start: 2021-01-01 | End: 2021-01-01

## 2021-01-01 RX ORDER — DOXYCYCLINE HYCLATE 100 MG/1
100 CAPSULE ORAL EVERY 12 HOURS
Status: DISCONTINUED | OUTPATIENT
Start: 2021-01-01 | End: 2021-01-01

## 2021-01-01 RX ORDER — PREDNISONE 20 MG/1
60 TABLET ORAL DAILY
Status: COMPLETED | OUTPATIENT
Start: 2021-01-01 | End: 2021-01-01

## 2021-01-01 RX ORDER — PREDNISONE 20 MG/1
20 TABLET ORAL
Status: DISCONTINUED | OUTPATIENT
Start: 2021-02-27 | End: 2021-01-01 | Stop reason: HOSPADM

## 2021-01-01 RX ORDER — TAMSULOSIN HYDROCHLORIDE 0.4 MG/1
0.4 CAPSULE ORAL
COMMUNITY
Start: 2020-01-01 | End: 2021-01-01 | Stop reason: HOSPADM

## 2021-01-01 RX ORDER — LACTULOSE 20 G/30ML
20 SOLUTION ORAL DAILY
Status: DISCONTINUED | OUTPATIENT
Start: 2021-01-01 | End: 2021-01-01

## 2021-01-01 RX ORDER — ALBUMIN, HUMAN INJ 5% 5 %
12.5 SOLUTION INTRAVENOUS ONCE
Status: COMPLETED | OUTPATIENT
Start: 2021-01-01 | End: 2021-01-01

## 2021-01-01 RX ORDER — POLYETHYLENE GLYCOL 3350 17 G/17G
17 POWDER, FOR SOLUTION ORAL DAILY PRN
Status: DISCONTINUED | OUTPATIENT
Start: 2021-01-01 | End: 2021-01-01 | Stop reason: HOSPADM

## 2021-01-01 RX ORDER — OLANZAPINE 5 MG/1
5 TABLET, ORALLY DISINTEGRATING ORAL
Status: DISCONTINUED | OUTPATIENT
Start: 2021-01-01 | End: 2021-01-01

## 2021-01-01 RX ORDER — MELATONIN
2000 DAILY
Status: DISCONTINUED | OUTPATIENT
Start: 2021-01-01 | End: 2021-01-01 | Stop reason: HOSPADM

## 2021-01-01 RX ORDER — DOXYCYCLINE HYCLATE 100 MG/1
100 CAPSULE ORAL ONCE
Status: COMPLETED | OUTPATIENT
Start: 2021-01-01 | End: 2021-01-01

## 2021-01-01 RX ORDER — LANOLIN ALCOHOL/MO/W.PET/CERES
6 CREAM (GRAM) TOPICAL
Status: DISCONTINUED | OUTPATIENT
Start: 2021-01-01 | End: 2021-01-01 | Stop reason: HOSPADM

## 2021-01-01 RX ORDER — SODIUM BICARBONATE 84 MG/ML
INJECTION, SOLUTION INTRAVENOUS CODE/TRAUMA/SEDATION MEDICATION
Status: COMPLETED | OUTPATIENT
Start: 2021-01-01 | End: 2021-01-01

## 2021-01-01 RX ORDER — FAMOTIDINE 20 MG/1
20 TABLET, FILM COATED ORAL 2 TIMES DAILY
Status: DISCONTINUED | OUTPATIENT
Start: 2021-01-01 | End: 2021-01-01 | Stop reason: HOSPADM

## 2021-01-01 RX ORDER — EPINEPHRINE 0.1 MG/ML
SYRINGE (ML) INJECTION CODE/TRAUMA/SEDATION MEDICATION
Status: COMPLETED | OUTPATIENT
Start: 2021-01-01 | End: 2021-01-01

## 2021-01-01 RX ORDER — ZINC SULFATE 50(220)MG
220 CAPSULE ORAL DAILY
Status: COMPLETED | OUTPATIENT
Start: 2021-01-01 | End: 2021-01-01

## 2021-01-01 RX ORDER — ALBUMIN (HUMAN) 12.5 G/50ML
25 SOLUTION INTRAVENOUS ONCE
Status: COMPLETED | OUTPATIENT
Start: 2021-01-01 | End: 2021-01-01

## 2021-01-01 RX ORDER — AMOXICILLIN 250 MG
1 CAPSULE ORAL 2 TIMES DAILY
Status: DISCONTINUED | OUTPATIENT
Start: 2021-01-01 | End: 2021-01-01 | Stop reason: HOSPADM

## 2021-01-01 RX ORDER — SODIUM CHLORIDE, SODIUM GLUCONATE, SODIUM ACETATE, POTASSIUM CHLORIDE, MAGNESIUM CHLORIDE, SODIUM PHOSPHATE, DIBASIC, AND POTASSIUM PHOSPHATE .53; .5; .37; .037; .03; .012; .00082 G/100ML; G/100ML; G/100ML; G/100ML; G/100ML; G/100ML; G/100ML
1000 INJECTION, SOLUTION INTRAVENOUS ONCE
Status: COMPLETED | OUTPATIENT
Start: 2021-01-01 | End: 2021-01-01

## 2021-01-01 RX ORDER — FENTANYL CITRATE 50 UG/ML
INJECTION, SOLUTION INTRAMUSCULAR; INTRAVENOUS
Status: COMPLETED
Start: 2021-01-01 | End: 2021-01-01

## 2021-01-01 RX ORDER — LIDOCAINE 50 MG/G
1 PATCH TOPICAL DAILY
Status: DISCONTINUED | OUTPATIENT
Start: 2021-01-01 | End: 2021-01-01

## 2021-01-01 RX ORDER — CALCIUM CHLORIDE 100 MG/ML
SYRINGE (ML) INTRAVENOUS CODE/TRAUMA/SEDATION MEDICATION
Status: COMPLETED | OUTPATIENT
Start: 2021-01-01 | End: 2021-01-01

## 2021-01-01 RX ORDER — ONDANSETRON 2 MG/ML
4 INJECTION INTRAMUSCULAR; INTRAVENOUS EVERY 6 HOURS PRN
Status: DISCONTINUED | OUTPATIENT
Start: 2021-01-01 | End: 2021-01-01 | Stop reason: HOSPADM

## 2021-01-01 RX ORDER — OLANZAPINE 5 MG/1
5 TABLET, ORALLY DISINTEGRATING ORAL
Status: DISCONTINUED | OUTPATIENT
Start: 2021-01-01 | End: 2021-01-01 | Stop reason: HOSPADM

## 2021-01-01 RX ORDER — FENTANYL CITRATE 50 UG/ML
50 INJECTION, SOLUTION INTRAMUSCULAR; INTRAVENOUS ONCE
Status: COMPLETED | OUTPATIENT
Start: 2021-01-01 | End: 2021-01-01

## 2021-01-01 RX ORDER — WARFARIN SODIUM 2.5 MG/1
2.5 TABLET ORAL
Status: DISCONTINUED | OUTPATIENT
Start: 2021-01-01 | End: 2021-01-01

## 2021-01-01 RX ORDER — POLYETHYLENE GLYCOL 3350 17 G/17G
17 POWDER, FOR SOLUTION ORAL DAILY
Status: DISCONTINUED | OUTPATIENT
Start: 2021-01-01 | End: 2021-01-01

## 2021-01-01 RX ORDER — PREDNISONE 10 MG/1
10 TABLET ORAL
Status: DISCONTINUED | OUTPATIENT
Start: 2021-03-01 | End: 2021-01-01 | Stop reason: HOSPADM

## 2021-01-01 RX ORDER — PREDNISONE 20 MG/1
40 TABLET ORAL
Status: DISCONTINUED | OUTPATIENT
Start: 2021-01-01 | End: 2021-01-01 | Stop reason: HOSPADM

## 2021-01-01 RX ORDER — BENZONATATE 100 MG/1
100 CAPSULE ORAL 3 TIMES DAILY PRN
Status: DISCONTINUED | OUTPATIENT
Start: 2021-01-01 | End: 2021-01-01 | Stop reason: HOSPADM

## 2021-01-01 RX ORDER — PANTOPRAZOLE SODIUM 20 MG/1
20 TABLET, DELAYED RELEASE ORAL
Status: DISCONTINUED | OUTPATIENT
Start: 2021-01-01 | End: 2021-01-01

## 2021-01-01 RX ORDER — LANOLIN ALCOHOL/MO/W.PET/CERES
6 CREAM (GRAM) TOPICAL
Status: DISCONTINUED | OUTPATIENT
Start: 2021-01-01 | End: 2021-01-01

## 2021-01-01 RX ORDER — SODIUM CHLORIDE 9 MG/ML
75 INJECTION, SOLUTION INTRAVENOUS CONTINUOUS
Status: DISCONTINUED | OUTPATIENT
Start: 2021-01-01 | End: 2021-01-01

## 2021-01-01 RX ORDER — SODIUM CHLORIDE, SODIUM GLUCONATE, SODIUM ACETATE, POTASSIUM CHLORIDE, MAGNESIUM CHLORIDE, SODIUM PHOSPHATE, DIBASIC, AND POTASSIUM PHOSPHATE .53; .5; .37; .037; .03; .012; .00082 G/100ML; G/100ML; G/100ML; G/100ML; G/100ML; G/100ML; G/100ML
2000 INJECTION, SOLUTION INTRAVENOUS ONCE
Status: COMPLETED | OUTPATIENT
Start: 2021-01-01 | End: 2021-01-01

## 2021-01-01 RX ORDER — LIDOCAINE 50 MG/G
1 PATCH TOPICAL DAILY
Status: DISCONTINUED | OUTPATIENT
Start: 2021-01-01 | End: 2021-01-01 | Stop reason: HOSPADM

## 2021-01-01 RX ORDER — LANOLIN ALCOHOL/MO/W.PET/CERES
10 CREAM (GRAM) TOPICAL
Status: DISCONTINUED | OUTPATIENT
Start: 2021-01-01 | End: 2021-01-01

## 2021-01-01 RX ORDER — VANCOMYCIN HYDROCHLORIDE 1 G/200ML
10 INJECTION, SOLUTION INTRAVENOUS EVERY 12 HOURS
Status: DISCONTINUED | OUTPATIENT
Start: 2021-01-01 | End: 2021-01-01

## 2021-01-01 RX ORDER — MULTIVITAMIN/IRON/FOLIC ACID 18MG-0.4MG
1 TABLET ORAL DAILY
Status: DISCONTINUED | OUTPATIENT
Start: 2021-01-01 | End: 2021-01-01 | Stop reason: HOSPADM

## 2021-01-01 RX ORDER — TAMSULOSIN HYDROCHLORIDE 0.4 MG/1
0.4 CAPSULE ORAL DAILY
Status: DISCONTINUED | OUTPATIENT
Start: 2021-01-01 | End: 2021-01-01 | Stop reason: HOSPADM

## 2021-01-01 RX ORDER — OLANZAPINE 10 MG/1
2.5 INJECTION, POWDER, LYOPHILIZED, FOR SOLUTION INTRAMUSCULAR ONCE
Status: DISCONTINUED | OUTPATIENT
Start: 2021-01-01 | End: 2021-01-01 | Stop reason: HOSPADM

## 2021-01-01 RX ORDER — LISINOPRIL 10 MG/1
10 TABLET ORAL EVERY EVENING
Status: DISCONTINUED | OUTPATIENT
Start: 2021-01-01 | End: 2021-01-01

## 2021-01-01 RX ADMIN — POLYETHYLENE GLYCOL 3350 17 G: 17 POWDER, FOR SOLUTION ORAL at 08:35

## 2021-01-01 RX ADMIN — Medication 1 TABLET: at 09:29

## 2021-01-01 RX ADMIN — TAMSULOSIN HYDROCHLORIDE 0.4 MG: 0.4 CAPSULE ORAL at 08:06

## 2021-01-01 RX ADMIN — ENOXAPARIN SODIUM 100 MG: 100 INJECTION SUBCUTANEOUS at 08:03

## 2021-01-01 RX ADMIN — FAMOTIDINE 20 MG: 20 TABLET ORAL at 17:01

## 2021-01-01 RX ADMIN — ENOXAPARIN SODIUM 100 MG: 100 INJECTION SUBCUTANEOUS at 21:30

## 2021-01-01 RX ADMIN — SENNOSIDES AND DOCUSATE SODIUM 1 TABLET: 8.6; 5 TABLET ORAL at 17:10

## 2021-01-01 RX ADMIN — FAMOTIDINE 20 MG: 20 TABLET ORAL at 18:12

## 2021-01-01 RX ADMIN — SODIUM CHLORIDE, SODIUM GLUCONATE, SODIUM ACETATE, POTASSIUM CHLORIDE, MAGNESIUM CHLORIDE, SODIUM PHOSPHATE, DIBASIC, AND POTASSIUM PHOSPHATE 2000 ML: .53; .5; .37; .037; .03; .012; .00082 INJECTION, SOLUTION INTRAVENOUS at 10:00

## 2021-01-01 RX ADMIN — TAMSULOSIN HYDROCHLORIDE 0.4 MG: 0.4 CAPSULE ORAL at 09:28

## 2021-01-01 RX ADMIN — FAMOTIDINE 20 MG: 20 TABLET ORAL at 17:48

## 2021-01-01 RX ADMIN — TAMSULOSIN HYDROCHLORIDE 0.4 MG: 0.4 CAPSULE ORAL at 08:00

## 2021-01-01 RX ADMIN — FAMOTIDINE 20 MG: 20 TABLET ORAL at 08:59

## 2021-01-01 RX ADMIN — SENNOSIDES AND DOCUSATE SODIUM 1 TABLET: 8.6; 5 TABLET ORAL at 08:56

## 2021-01-01 RX ADMIN — FAMOTIDINE 20 MG: 20 TABLET ORAL at 17:53

## 2021-01-01 RX ADMIN — SENNOSIDES AND DOCUSATE SODIUM 1 TABLET: 8.6; 5 TABLET ORAL at 18:43

## 2021-01-01 RX ADMIN — OXYCODONE HYDROCHLORIDE AND ACETAMINOPHEN 1000 MG: 500 TABLET ORAL at 07:37

## 2021-01-01 RX ADMIN — DESMOPRESSIN ACETATE 40 MG: 0.2 TABLET ORAL at 21:09

## 2021-01-01 RX ADMIN — OXYCODONE HYDROCHLORIDE AND ACETAMINOPHEN 1000 MG: 500 TABLET ORAL at 20:20

## 2021-01-01 RX ADMIN — OXYCODONE HYDROCHLORIDE AND ACETAMINOPHEN 1000 MG: 500 TABLET ORAL at 08:35

## 2021-01-01 RX ADMIN — TAMSULOSIN HYDROCHLORIDE 0.4 MG: 0.4 CAPSULE ORAL at 08:56

## 2021-01-01 RX ADMIN — FAMOTIDINE 20 MG: 20 TABLET ORAL at 17:10

## 2021-01-01 RX ADMIN — FAMOTIDINE 20 MG: 20 TABLET ORAL at 17:15

## 2021-01-01 RX ADMIN — SENNOSIDES AND DOCUSATE SODIUM 1 TABLET: 8.6; 5 TABLET ORAL at 07:33

## 2021-01-01 RX ADMIN — SENNOSIDES AND DOCUSATE SODIUM 1 TABLET: 8.6; 5 TABLET ORAL at 17:51

## 2021-01-01 RX ADMIN — OXYCODONE HYDROCHLORIDE AND ACETAMINOPHEN 1000 MG: 500 TABLET ORAL at 08:27

## 2021-01-01 RX ADMIN — DESMOPRESSIN ACETATE 40 MG: 0.2 TABLET ORAL at 21:22

## 2021-01-01 RX ADMIN — FENTANYL CITRATE 50 MCG: 50 INJECTION INTRAMUSCULAR; INTRAVENOUS at 05:12

## 2021-01-01 RX ADMIN — DEXTROSE MONOHYDRATE 25 G: 500 INJECTION PARENTERAL at 11:56

## 2021-01-01 RX ADMIN — OLANZAPINE 5 MG: 5 TABLET, ORALLY DISINTEGRATING ORAL at 22:15

## 2021-01-01 RX ADMIN — TAMSULOSIN HYDROCHLORIDE 0.4 MG: 0.4 CAPSULE ORAL at 08:36

## 2021-01-01 RX ADMIN — REMDESIVIR 100 MG: 100 INJECTION, POWDER, LYOPHILIZED, FOR SOLUTION INTRAVENOUS at 21:10

## 2021-01-01 RX ADMIN — POLYETHYLENE GLYCOL 3350 17 G: 17 POWDER, FOR SOLUTION ORAL at 08:04

## 2021-01-01 RX ADMIN — Medication 1 TABLET: at 10:18

## 2021-01-01 RX ADMIN — DESMOPRESSIN ACETATE 40 MG: 0.2 TABLET ORAL at 00:27

## 2021-01-01 RX ADMIN — DEXAMETHASONE SODIUM PHOSPHATE 10.9 MG: 10 INJECTION, SOLUTION INTRAMUSCULAR; INTRAVENOUS at 14:42

## 2021-01-01 RX ADMIN — DEXAMETHASONE SODIUM PHOSPHATE 10.9 MG: 10 INJECTION, SOLUTION INTRAMUSCULAR; INTRAVENOUS at 12:53

## 2021-01-01 RX ADMIN — POLYETHYLENE GLYCOL 3350 17 G: 17 POWDER, FOR SOLUTION ORAL at 09:29

## 2021-01-01 RX ADMIN — Medication 6 MG: at 21:32

## 2021-01-01 RX ADMIN — TAMSULOSIN HYDROCHLORIDE 0.4 MG: 0.4 CAPSULE ORAL at 08:35

## 2021-01-01 RX ADMIN — ZINC SULFATE 220 MG (50 MG) CAPSULE 220 MG: CAPSULE at 08:35

## 2021-01-01 RX ADMIN — HEPARIN SODIUM 5000 UNITS: 5000 INJECTION INTRAVENOUS; SUBCUTANEOUS at 14:29

## 2021-01-01 RX ADMIN — CALCIUM CHLORIDE 1 G: 100 INJECTION PARENTERAL at 11:59

## 2021-01-01 RX ADMIN — Medication 6 MG: at 22:03

## 2021-01-01 RX ADMIN — REMDESIVIR 200 MG: 100 INJECTION, POWDER, LYOPHILIZED, FOR SOLUTION INTRAVENOUS at 19:29

## 2021-01-01 RX ADMIN — SENNOSIDES AND DOCUSATE SODIUM 1 TABLET: 8.6; 5 TABLET ORAL at 17:52

## 2021-01-01 RX ADMIN — DOXYCYCLINE 100 MG: 100 CAPSULE ORAL at 13:43

## 2021-01-01 RX ADMIN — ZINC SULFATE 220 MG (50 MG) CAPSULE 220 MG: CAPSULE at 08:59

## 2021-01-01 RX ADMIN — DEXAMETHASONE SODIUM PHOSPHATE 10.9 MG: 10 INJECTION, SOLUTION INTRAMUSCULAR; INTRAVENOUS at 14:26

## 2021-01-01 RX ADMIN — FAMOTIDINE 20 MG: 20 TABLET ORAL at 07:36

## 2021-01-01 RX ADMIN — TAMSULOSIN HYDROCHLORIDE 0.4 MG: 0.4 CAPSULE ORAL at 08:50

## 2021-01-01 RX ADMIN — SODIUM BICARBONATE 50 MEQ: 84 INJECTION, SOLUTION INTRAVENOUS at 12:03

## 2021-01-01 RX ADMIN — LISINOPRIL 10 MG: 10 TABLET ORAL at 17:01

## 2021-01-01 RX ADMIN — OLANZAPINE 5 MG: 5 TABLET, ORALLY DISINTEGRATING ORAL at 22:05

## 2021-01-01 RX ADMIN — Medication 10.5 MG: at 21:22

## 2021-01-01 RX ADMIN — DESMOPRESSIN ACETATE 40 MG: 0.2 TABLET ORAL at 22:02

## 2021-01-01 RX ADMIN — METOPROLOL TARTRATE 25 MG: 25 TABLET, FILM COATED ORAL at 22:00

## 2021-01-01 RX ADMIN — CEFTRIAXONE 1000 MG: 10 INJECTION, POWDER, FOR SOLUTION INTRAVENOUS at 12:05

## 2021-01-01 RX ADMIN — CEFTRIAXONE 1000 MG: 10 INJECTION, POWDER, FOR SOLUTION INTRAVENOUS at 12:46

## 2021-01-01 RX ADMIN — Medication 10.5 MG: at 21:09

## 2021-01-01 RX ADMIN — LISINOPRIL 10 MG: 10 TABLET ORAL at 18:12

## 2021-01-01 RX ADMIN — HYDROCORTISONE SODIUM SUCCINATE 100 MG: 100 INJECTION, POWDER, FOR SOLUTION INTRAMUSCULAR; INTRAVENOUS at 11:20

## 2021-01-01 RX ADMIN — PANTOPRAZOLE SODIUM 20 MG: 20 TABLET, DELAYED RELEASE ORAL at 05:43

## 2021-01-01 RX ADMIN — FAMOTIDINE 20 MG: 20 TABLET ORAL at 08:50

## 2021-01-01 RX ADMIN — SODIUM BICARBONATE 100 ML/HR: 84 INJECTION, SOLUTION INTRAVENOUS at 11:45

## 2021-01-01 RX ADMIN — OXYCODONE HYDROCHLORIDE AND ACETAMINOPHEN 1000 MG: 500 TABLET ORAL at 08:58

## 2021-01-01 RX ADMIN — FAMOTIDINE 20 MG: 20 TABLET ORAL at 08:00

## 2021-01-01 RX ADMIN — OXYCODONE HYDROCHLORIDE AND ACETAMINOPHEN 1000 MG: 500 TABLET ORAL at 21:22

## 2021-01-01 RX ADMIN — FAMOTIDINE 20 MG: 20 TABLET ORAL at 08:36

## 2021-01-01 RX ADMIN — DEXAMETHASONE SODIUM PHOSPHATE 10.9 MG: 10 INJECTION, SOLUTION INTRAMUSCULAR; INTRAVENOUS at 02:05

## 2021-01-01 RX ADMIN — EPINEPHRINE 1 MG: 0.1 INJECTION, SOLUTION ENDOTRACHEAL; INTRACARDIAC; INTRAVENOUS at 11:58

## 2021-01-01 RX ADMIN — FAMOTIDINE 20 MG: 20 TABLET ORAL at 17:52

## 2021-01-01 RX ADMIN — FENTANYL CITRATE 50 MCG: 50 INJECTION, SOLUTION INTRAMUSCULAR; INTRAVENOUS at 05:12

## 2021-01-01 RX ADMIN — DEXAMETHASONE SODIUM PHOSPHATE 10.9 MG: 10 INJECTION, SOLUTION INTRAMUSCULAR; INTRAVENOUS at 02:38

## 2021-01-01 RX ADMIN — SODIUM BICARBONATE 50 MEQ: 84 INJECTION, SOLUTION INTRAVENOUS at 11:54

## 2021-01-01 RX ADMIN — ENOXAPARIN SODIUM 100 MG: 100 INJECTION SUBCUTANEOUS at 08:04

## 2021-01-01 RX ADMIN — FAMOTIDINE 20 MG: 20 TABLET ORAL at 18:43

## 2021-01-01 RX ADMIN — SENNOSIDES AND DOCUSATE SODIUM 1 TABLET: 8.6; 5 TABLET ORAL at 17:16

## 2021-01-01 RX ADMIN — METOPROLOL TARTRATE 25 MG: 25 TABLET, FILM COATED ORAL at 21:09

## 2021-01-01 RX ADMIN — LACTULOSE 20 G: 10 SOLUTION ORAL at 13:59

## 2021-01-01 RX ADMIN — LACTULOSE 20 G: 10 SOLUTION ORAL at 09:30

## 2021-01-01 RX ADMIN — FAMOTIDINE 20 MG: 20 TABLET ORAL at 09:30

## 2021-01-01 RX ADMIN — DEXAMETHASONE SODIUM PHOSPHATE 10.9 MG: 10 INJECTION, SOLUTION INTRAMUSCULAR; INTRAVENOUS at 13:27

## 2021-01-01 RX ADMIN — ZINC SULFATE 220 MG (50 MG) CAPSULE 220 MG: CAPSULE at 09:24

## 2021-01-01 RX ADMIN — DESMOPRESSIN ACETATE 40 MG: 0.2 TABLET ORAL at 22:15

## 2021-01-01 RX ADMIN — SENNOSIDES AND DOCUSATE SODIUM 1 TABLET: 8.6; 5 TABLET ORAL at 08:30

## 2021-01-01 RX ADMIN — TAMSULOSIN HYDROCHLORIDE 0.4 MG: 0.4 CAPSULE ORAL at 09:24

## 2021-01-01 RX ADMIN — FAMOTIDINE 20 MG: 20 TABLET ORAL at 08:35

## 2021-01-01 RX ADMIN — PREDNISONE 60 MG: 20 TABLET ORAL at 12:12

## 2021-01-01 RX ADMIN — REMDESIVIR 100 MG: 100 INJECTION, POWDER, LYOPHILIZED, FOR SOLUTION INTRAVENOUS at 18:06

## 2021-01-01 RX ADMIN — OXYCODONE HYDROCHLORIDE AND ACETAMINOPHEN 1000 MG: 500 TABLET ORAL at 09:49

## 2021-01-01 RX ADMIN — DEXAMETHASONE SODIUM PHOSPHATE 10.9 MG: 10 INJECTION, SOLUTION INTRAMUSCULAR; INTRAVENOUS at 13:50

## 2021-01-01 RX ADMIN — CALCIUM CHLORIDE 1 G: 100 INJECTION PARENTERAL at 11:55

## 2021-01-01 RX ADMIN — DEXAMETHASONE SODIUM PHOSPHATE 9.56 MG: 4 INJECTION, SOLUTION INTRA-ARTICULAR; INTRALESIONAL; INTRAMUSCULAR; INTRAVENOUS; SOFT TISSUE at 07:32

## 2021-01-01 RX ADMIN — OXYCODONE HYDROCHLORIDE AND ACETAMINOPHEN 1000 MG: 500 TABLET ORAL at 21:32

## 2021-01-01 RX ADMIN — Medication 6 MG: at 22:31

## 2021-01-01 RX ADMIN — Medication 6 MG: at 21:26

## 2021-01-01 RX ADMIN — DOXYCYCLINE 100 MG: 100 CAPSULE ORAL at 10:50

## 2021-01-01 RX ADMIN — TAMSULOSIN HYDROCHLORIDE 0.4 MG: 0.4 CAPSULE ORAL at 09:30

## 2021-01-01 RX ADMIN — Medication 10.5 MG: at 22:14

## 2021-01-01 RX ADMIN — Medication 2000 UNITS: at 07:37

## 2021-01-01 RX ADMIN — DEXAMETHASONE SODIUM PHOSPHATE 6 MG: 4 INJECTION INTRA-ARTICULAR; INTRALESIONAL; INTRAMUSCULAR; INTRAVENOUS; SOFT TISSUE at 08:31

## 2021-01-01 RX ADMIN — Medication 6 MG: at 21:49

## 2021-01-01 RX ADMIN — DEXAMETHASONE SODIUM PHOSPHATE 9.56 MG: 4 INJECTION, SOLUTION INTRA-ARTICULAR; INTRALESIONAL; INTRAMUSCULAR; INTRAVENOUS; SOFT TISSUE at 08:05

## 2021-01-01 RX ADMIN — ALBUMIN (HUMAN) 12.5 G: 12.5 INJECTION, SOLUTION INTRAVENOUS at 01:01

## 2021-01-01 RX ADMIN — ENOXAPARIN SODIUM 100 MG: 100 INJECTION SUBCUTANEOUS at 22:15

## 2021-01-01 RX ADMIN — PREDNISONE 50 MG: 20 TABLET ORAL at 08:00

## 2021-01-01 RX ADMIN — ENOXAPARIN SODIUM 100 MG: 100 INJECTION SUBCUTANEOUS at 21:50

## 2021-01-01 RX ADMIN — TAMSULOSIN HYDROCHLORIDE 0.4 MG: 0.4 CAPSULE ORAL at 07:40

## 2021-01-01 RX ADMIN — Medication 10.5 MG: at 21:58

## 2021-01-01 RX ADMIN — DESMOPRESSIN ACETATE 40 MG: 0.2 TABLET ORAL at 21:32

## 2021-01-01 RX ADMIN — Medication 2000 UNITS: at 08:36

## 2021-01-01 RX ADMIN — Medication 2000 UNITS: at 08:27

## 2021-01-01 RX ADMIN — PREDNISONE 40 MG: 20 TABLET ORAL at 09:14

## 2021-01-01 RX ADMIN — Medication 2000 UNITS: at 08:50

## 2021-01-01 RX ADMIN — DEXAMETHASONE SODIUM PHOSPHATE 10.9 MG: 10 INJECTION, SOLUTION INTRAMUSCULAR; INTRAVENOUS at 00:48

## 2021-01-01 RX ADMIN — POLYETHYLENE GLYCOL 3350 17 G: 17 POWDER, FOR SOLUTION ORAL at 10:18

## 2021-01-01 RX ADMIN — Medication 2000 UNITS: at 08:30

## 2021-01-01 RX ADMIN — METOPROLOL TARTRATE 25 MG: 25 TABLET, FILM COATED ORAL at 21:30

## 2021-01-01 RX ADMIN — DEXAMETHASONE SODIUM PHOSPHATE 10.9 MG: 10 INJECTION, SOLUTION INTRAMUSCULAR; INTRAVENOUS at 13:59

## 2021-01-01 RX ADMIN — ENOXAPARIN SODIUM 100 MG: 100 INJECTION SUBCUTANEOUS at 08:49

## 2021-01-01 RX ADMIN — FAMOTIDINE 20 MG: 20 TABLET ORAL at 09:49

## 2021-01-01 RX ADMIN — ENOXAPARIN SODIUM 100 MG: 100 INJECTION SUBCUTANEOUS at 08:36

## 2021-01-01 RX ADMIN — SENNOSIDES AND DOCUSATE SODIUM 1 TABLET: 8.6; 5 TABLET ORAL at 14:15

## 2021-01-01 RX ADMIN — PHYTONADIONE 5 MG: 10 INJECTION, EMULSION INTRAMUSCULAR; INTRAVENOUS; SUBCUTANEOUS at 14:15

## 2021-01-01 RX ADMIN — TAMSULOSIN HYDROCHLORIDE 0.4 MG: 0.4 CAPSULE ORAL at 08:30

## 2021-01-01 RX ADMIN — FAMOTIDINE 20 MG: 20 TABLET ORAL at 07:33

## 2021-01-01 RX ADMIN — ZINC SULFATE 220 MG (50 MG) CAPSULE 220 MG: CAPSULE at 09:50

## 2021-01-01 RX ADMIN — TAMSULOSIN HYDROCHLORIDE 0.4 MG: 0.4 CAPSULE ORAL at 09:49

## 2021-01-01 RX ADMIN — DEXAMETHASONE SODIUM PHOSPHATE 10.9 MG: 10 INJECTION, SOLUTION INTRAMUSCULAR; INTRAVENOUS at 12:39

## 2021-01-01 RX ADMIN — Medication 1 TABLET: at 08:53

## 2021-01-01 RX ADMIN — Medication 2000 UNITS: at 07:33

## 2021-01-01 RX ADMIN — FAMOTIDINE 20 MG: 20 TABLET ORAL at 17:21

## 2021-01-01 RX ADMIN — ENOXAPARIN SODIUM 100 MG: 100 INJECTION SUBCUTANEOUS at 22:00

## 2021-01-01 RX ADMIN — PREDNISONE 60 MG: 20 TABLET ORAL at 09:29

## 2021-01-01 RX ADMIN — ENOXAPARIN SODIUM 100 MG: 100 INJECTION SUBCUTANEOUS at 10:18

## 2021-01-01 RX ADMIN — SENNOSIDES AND DOCUSATE SODIUM 1 TABLET: 8.6; 5 TABLET ORAL at 08:34

## 2021-01-01 RX ADMIN — FAMOTIDINE 20 MG: 20 TABLET ORAL at 09:24

## 2021-01-01 RX ADMIN — SENNOSIDES AND DOCUSATE SODIUM 1 TABLET: 8.6; 5 TABLET ORAL at 08:04

## 2021-01-01 RX ADMIN — PREDNISONE 50 MG: 20 TABLET ORAL at 08:50

## 2021-01-01 RX ADMIN — ENOXAPARIN SODIUM 100 MG: 100 INJECTION SUBCUTANEOUS at 21:23

## 2021-01-01 RX ADMIN — BENZONATATE 100 MG: 100 CAPSULE ORAL at 18:43

## 2021-01-01 RX ADMIN — DESMOPRESSIN ACETATE 40 MG: 0.2 TABLET ORAL at 21:59

## 2021-01-01 RX ADMIN — TOCILIZUMAB 800 MG: 20 INJECTION, SOLUTION, CONCENTRATE INTRAVENOUS at 17:53

## 2021-01-01 RX ADMIN — Medication 6 MG: at 21:36

## 2021-01-01 RX ADMIN — ENOXAPARIN SODIUM 100 MG: 100 INJECTION SUBCUTANEOUS at 09:13

## 2021-01-01 RX ADMIN — Medication 2000 UNITS: at 08:00

## 2021-01-01 RX ADMIN — FAMOTIDINE 20 MG: 20 TABLET ORAL at 09:14

## 2021-01-01 RX ADMIN — Medication 2000 UNITS: at 09:24

## 2021-01-01 RX ADMIN — OLANZAPINE 5 MG: 5 TABLET, ORALLY DISINTEGRATING ORAL at 00:27

## 2021-01-01 RX ADMIN — ZINC SULFATE 220 MG (50 MG) CAPSULE 220 MG: CAPSULE at 07:39

## 2021-01-01 RX ADMIN — OXYCODONE HYDROCHLORIDE AND ACETAMINOPHEN 1000 MG: 500 TABLET ORAL at 21:49

## 2021-01-01 RX ADMIN — FAMOTIDINE 20 MG: 20 TABLET ORAL at 18:21

## 2021-01-01 RX ADMIN — Medication 2000 UNITS: at 09:14

## 2021-01-01 RX ADMIN — FAMOTIDINE 20 MG: 20 TABLET ORAL at 08:06

## 2021-01-01 RX ADMIN — DOXYCYCLINE 100 MG: 100 CAPSULE ORAL at 23:09

## 2021-01-01 RX ADMIN — DEXAMETHASONE SODIUM PHOSPHATE 10.9 MG: 10 INJECTION, SOLUTION INTRAMUSCULAR; INTRAVENOUS at 01:06

## 2021-01-01 RX ADMIN — Medication 2000 UNITS: at 07:39

## 2021-01-01 RX ADMIN — EPINEPHRINE 1 MG: 0.1 INJECTION, SOLUTION ENDOTRACHEAL; INTRACARDIAC; INTRAVENOUS at 12:01

## 2021-01-01 RX ADMIN — OXYCODONE HYDROCHLORIDE AND ACETAMINOPHEN 1000 MG: 500 TABLET ORAL at 07:39

## 2021-01-01 RX ADMIN — REMDESIVIR 100 MG: 100 INJECTION, POWDER, LYOPHILIZED, FOR SOLUTION INTRAVENOUS at 18:07

## 2021-01-01 RX ADMIN — DOXYCYCLINE 100 MG: 100 CAPSULE ORAL at 11:17

## 2021-01-01 RX ADMIN — FAMOTIDINE 20 MG: 20 TABLET ORAL at 17:36

## 2021-01-01 RX ADMIN — DESMOPRESSIN ACETATE 40 MG: 0.2 TABLET ORAL at 21:11

## 2021-01-01 RX ADMIN — OLANZAPINE 2.5 MG: 5 TABLET, ORALLY DISINTEGRATING ORAL at 21:30

## 2021-01-01 RX ADMIN — FAMOTIDINE 20 MG: 20 TABLET ORAL at 17:51

## 2021-01-01 RX ADMIN — ENOXAPARIN SODIUM 100 MG: 100 INJECTION SUBCUTANEOUS at 08:55

## 2021-01-01 RX ADMIN — DEXAMETHASONE SODIUM PHOSPHATE 10.9 MG: 10 INJECTION, SOLUTION INTRAMUSCULAR; INTRAVENOUS at 01:14

## 2021-01-01 RX ADMIN — OXYCODONE HYDROCHLORIDE AND ACETAMINOPHEN 1000 MG: 500 TABLET ORAL at 09:24

## 2021-01-01 RX ADMIN — OLANZAPINE 5 MG: 5 TABLET, ORALLY DISINTEGRATING ORAL at 22:01

## 2021-01-01 RX ADMIN — DESMOPRESSIN ACETATE 40 MG: 0.2 TABLET ORAL at 21:36

## 2021-01-01 RX ADMIN — Medication 30 MCG/MIN: at 09:00

## 2021-01-01 RX ADMIN — DOXYCYCLINE 100 MG: 100 CAPSULE ORAL at 22:09

## 2021-01-01 RX ADMIN — DEXAMETHASONE SODIUM PHOSPHATE 10.9 MG: 10 INJECTION, SOLUTION INTRAMUSCULAR; INTRAVENOUS at 04:00

## 2021-01-01 RX ADMIN — VASOPRESSIN 0.04 UNITS/MIN: 20 INJECTION INTRAVENOUS at 09:15

## 2021-01-01 RX ADMIN — Medication 10.5 MG: at 21:30

## 2021-01-01 RX ADMIN — DEXAMETHASONE SODIUM PHOSPHATE 6 MG: 4 INJECTION, SOLUTION INTRA-ARTICULAR; INTRALESIONAL; INTRAMUSCULAR; INTRAVENOUS; SOFT TISSUE at 18:12

## 2021-01-01 RX ADMIN — FAMOTIDINE 20 MG: 20 TABLET ORAL at 17:16

## 2021-01-01 RX ADMIN — ALBUMIN (HUMAN) 25 G: 0.25 INJECTION, SOLUTION INTRAVENOUS at 13:33

## 2021-01-01 RX ADMIN — Medication 2000 UNITS: at 09:28

## 2021-01-01 RX ADMIN — CEFTRIAXONE 1000 MG: 10 INJECTION, POWDER, FOR SOLUTION INTRAVENOUS at 14:41

## 2021-01-01 RX ADMIN — SENNOSIDES AND DOCUSATE SODIUM 1 TABLET: 8.6; 5 TABLET ORAL at 18:15

## 2021-01-01 RX ADMIN — OLANZAPINE 5 MG: 5 TABLET, ORALLY DISINTEGRATING ORAL at 13:37

## 2021-01-01 RX ADMIN — PANTOPRAZOLE SODIUM 20 MG: 20 TABLET, DELAYED RELEASE ORAL at 08:58

## 2021-01-01 RX ADMIN — FAMOTIDINE 20 MG: 20 TABLET ORAL at 10:19

## 2021-01-01 RX ADMIN — Medication 1 TABLET: at 08:30

## 2021-01-01 RX ADMIN — OXYCODONE HYDROCHLORIDE AND ACETAMINOPHEN 1000 MG: 500 TABLET ORAL at 21:14

## 2021-01-01 RX ADMIN — OLANZAPINE 2.5 MG: 5 TABLET, ORALLY DISINTEGRATING ORAL at 21:09

## 2021-01-01 RX ADMIN — SODIUM CHLORIDE 1000 ML: 9 INJECTION, SOLUTION INTRAVENOUS at 11:28

## 2021-01-01 RX ADMIN — SENNOSIDES AND DOCUSATE SODIUM 1 TABLET: 8.6; 5 TABLET ORAL at 08:50

## 2021-01-01 RX ADMIN — REMDESIVIR 100 MG: 100 INJECTION, POWDER, LYOPHILIZED, FOR SOLUTION INTRAVENOUS at 18:41

## 2021-01-01 RX ADMIN — Medication 1 TABLET: at 09:21

## 2021-01-01 RX ADMIN — Medication 2000 UNITS: at 08:04

## 2021-01-01 RX ADMIN — DESMOPRESSIN ACETATE 40 MG: 0.2 TABLET ORAL at 21:50

## 2021-01-01 RX ADMIN — FAMOTIDINE 20 MG: 20 TABLET ORAL at 17:05

## 2021-01-01 RX ADMIN — Medication 1 TABLET: at 09:15

## 2021-01-01 RX ADMIN — SODIUM CHLORIDE 0.2 MCG/KG/HR: 9 INJECTION, SOLUTION INTRAVENOUS at 22:08

## 2021-01-01 RX ADMIN — DESMOPRESSIN ACETATE 40 MG: 0.2 TABLET ORAL at 21:26

## 2021-01-01 RX ADMIN — SODIUM CHLORIDE 75 ML/HR: 0.9 INJECTION, SOLUTION INTRAVENOUS at 08:59

## 2021-01-01 RX ADMIN — SENNOSIDES AND DOCUSATE SODIUM 1 TABLET: 8.6; 5 TABLET ORAL at 17:48

## 2021-01-01 RX ADMIN — FAMOTIDINE 20 MG: 20 TABLET ORAL at 08:53

## 2021-01-01 RX ADMIN — CEFTRIAXONE SODIUM 1000 MG: 10 INJECTION, POWDER, FOR SOLUTION INTRAVENOUS at 13:43

## 2021-01-01 RX ADMIN — ZINC SULFATE 220 MG (50 MG) CAPSULE 220 MG: CAPSULE at 07:37

## 2021-01-01 RX ADMIN — METOPROLOL TARTRATE 25 MG: 25 TABLET, FILM COATED ORAL at 08:50

## 2021-01-01 RX ADMIN — Medication 2000 UNITS: at 08:55

## 2021-01-01 RX ADMIN — LIDOCAINE 5% 1 PATCH: 700 PATCH TOPICAL at 07:49

## 2021-01-01 RX ADMIN — FAMOTIDINE 20 MG: 20 TABLET ORAL at 08:27

## 2021-01-01 RX ADMIN — LACTULOSE 20 G: 10 SOLUTION ORAL at 08:06

## 2021-01-01 RX ADMIN — DOXYCYCLINE 100 MG: 100 CAPSULE ORAL at 01:01

## 2021-01-01 RX ADMIN — POLYETHYLENE GLYCOL 3350 17 G: 17 POWDER, FOR SOLUTION ORAL at 08:57

## 2021-01-01 RX ADMIN — DEXAMETHASONE SODIUM PHOSPHATE 10.9 MG: 10 INJECTION, SOLUTION INTRAMUSCULAR; INTRAVENOUS at 00:50

## 2021-01-01 RX ADMIN — SENNOSIDES AND DOCUSATE SODIUM 1 TABLET: 8.6; 5 TABLET ORAL at 09:30

## 2021-01-01 RX ADMIN — ENOXAPARIN SODIUM 100 MG: 100 INJECTION SUBCUTANEOUS at 07:32

## 2021-01-01 RX ADMIN — Medication 10.5 MG: at 21:50

## 2021-01-01 RX ADMIN — TAMSULOSIN HYDROCHLORIDE 0.4 MG: 0.4 CAPSULE ORAL at 07:38

## 2021-01-01 RX ADMIN — ENOXAPARIN SODIUM 100 MG: 100 INJECTION SUBCUTANEOUS at 21:59

## 2021-01-01 RX ADMIN — Medication 2000 UNITS: at 10:19

## 2021-01-01 RX ADMIN — Medication 1 TABLET: at 08:50

## 2021-01-01 RX ADMIN — ENOXAPARIN SODIUM 100 MG: 100 INJECTION SUBCUTANEOUS at 09:29

## 2021-01-01 RX ADMIN — ENOXAPARIN SODIUM 100 MG: 100 INJECTION SUBCUTANEOUS at 08:34

## 2021-01-01 RX ADMIN — PHENYLEPHRINE HYDROCHLORIDE 200 MCG/MIN: 10 INJECTION INTRAVENOUS at 09:45

## 2021-01-01 RX ADMIN — OXYCODONE HYDROCHLORIDE AND ACETAMINOPHEN 1000 MG: 500 TABLET ORAL at 22:09

## 2021-01-01 RX ADMIN — SENNOSIDES AND DOCUSATE SODIUM 1 TABLET: 8.6; 5 TABLET ORAL at 10:18

## 2021-01-01 RX ADMIN — ENOXAPARIN SODIUM 100 MG: 100 INJECTION SUBCUTANEOUS at 20:44

## 2021-01-01 RX ADMIN — DOXYCYCLINE 100 MG: 100 CAPSULE ORAL at 12:46

## 2021-01-01 RX ADMIN — Medication 10.5 MG: at 00:27

## 2021-01-01 RX ADMIN — POLYETHYLENE GLYCOL 3350 17 G: 17 POWDER, FOR SOLUTION ORAL at 08:36

## 2021-01-01 RX ADMIN — Medication 2000 UNITS: at 08:35

## 2021-01-01 RX ADMIN — EPINEPHRINE 1 MG: 0.1 INJECTION, SOLUTION ENDOTRACHEAL; INTRACARDIAC; INTRAVENOUS at 11:55

## 2021-01-01 RX ADMIN — METOPROLOL TARTRATE 25 MG: 25 TABLET, FILM COATED ORAL at 21:22

## 2021-01-01 RX ADMIN — SENNOSIDES AND DOCUSATE SODIUM 1 TABLET: 8.6; 5 TABLET ORAL at 17:53

## 2021-01-01 RX ADMIN — Medication 12.5 MG: at 21:50

## 2021-01-01 RX ADMIN — DESMOPRESSIN ACETATE 40 MG: 0.2 TABLET ORAL at 21:49

## 2021-01-01 RX ADMIN — TAMSULOSIN HYDROCHLORIDE 0.4 MG: 0.4 CAPSULE ORAL at 08:27

## 2021-01-01 RX ADMIN — SENNOSIDES AND DOCUSATE SODIUM 1 TABLET: 8.6; 5 TABLET ORAL at 09:29

## 2021-01-01 RX ADMIN — FAMOTIDINE 20 MG: 20 TABLET ORAL at 08:30

## 2021-01-01 RX ADMIN — OLANZAPINE 5 MG: 5 TABLET, ORALLY DISINTEGRATING ORAL at 23:08

## 2021-01-01 RX ADMIN — DEXAMETHASONE SODIUM PHOSPHATE 10.9 MG: 10 INJECTION, SOLUTION INTRAMUSCULAR; INTRAVENOUS at 14:52

## 2021-01-01 RX ADMIN — Medication 1 TABLET: at 08:36

## 2021-01-01 RX ADMIN — TAMSULOSIN HYDROCHLORIDE 0.4 MG: 0.4 CAPSULE ORAL at 08:58

## 2021-01-01 RX ADMIN — DEXAMETHASONE SODIUM PHOSPHATE 10.9 MG: 10 INJECTION, SOLUTION INTRAMUSCULAR; INTRAVENOUS at 14:14

## 2021-01-01 RX ADMIN — OXYCODONE HYDROCHLORIDE AND ACETAMINOPHEN 1000 MG: 500 TABLET ORAL at 21:11

## 2021-01-01 RX ADMIN — HYDROCORTISONE SODIUM SUCCINATE 100 MG: 100 INJECTION, POWDER, FOR SOLUTION INTRAMUSCULAR; INTRAVENOUS at 11:26

## 2021-01-01 RX ADMIN — Medication 1 TABLET: at 07:33

## 2021-01-01 RX ADMIN — Medication 12.5 MG: at 17:52

## 2021-01-01 RX ADMIN — WARFARIN SODIUM 5 MG: 5 TABLET ORAL at 18:12

## 2021-01-01 RX ADMIN — FAMOTIDINE 20 MG: 20 TABLET ORAL at 18:07

## 2021-01-01 RX ADMIN — Medication 2000 UNITS: at 09:30

## 2021-01-01 RX ADMIN — OLANZAPINE 5 MG: 5 TABLET, ORALLY DISINTEGRATING ORAL at 14:41

## 2021-01-01 RX ADMIN — FAMOTIDINE 20 MG: 20 TABLET ORAL at 09:29

## 2021-01-01 RX ADMIN — OLANZAPINE 2.5 MG: 5 TABLET, ORALLY DISINTEGRATING ORAL at 21:26

## 2021-01-01 RX ADMIN — Medication 2000 UNITS: at 09:50

## 2021-01-01 RX ADMIN — SODIUM CHLORIDE, SODIUM GLUCONATE, SODIUM ACETATE, POTASSIUM CHLORIDE, MAGNESIUM CHLORIDE, SODIUM PHOSPHATE, DIBASIC, AND POTASSIUM PHOSPHATE 1000 ML: .53; .5; .37; .037; .03; .012; .00082 INJECTION, SOLUTION INTRAVENOUS at 00:53

## 2021-01-01 RX ADMIN — DESMOPRESSIN ACETATE 40 MG: 0.2 TABLET ORAL at 21:30

## 2021-01-01 RX ADMIN — Medication 12.5 MG: at 08:05

## 2021-01-01 RX ADMIN — Medication 12.5 MG: at 08:56

## 2021-01-01 RX ADMIN — Medication 1 TABLET: at 08:05

## 2021-01-01 RX ADMIN — ENOXAPARIN SODIUM 100 MG: 100 INJECTION SUBCUTANEOUS at 09:28

## 2021-01-01 RX ADMIN — EPINEPHRINE 1 MG: 0.1 INJECTION, SOLUTION ENDOTRACHEAL; INTRACARDIAC; INTRAVENOUS at 11:52

## 2021-01-01 RX ADMIN — Medication 1 TABLET: at 09:30

## 2021-01-01 RX ADMIN — HEPARIN SODIUM 5000 UNITS: 5000 INJECTION INTRAVENOUS; SUBCUTANEOUS at 05:32

## 2021-01-01 RX ADMIN — RISPERIDONE 1 MG: 1 TABLET, ORALLY DISINTEGRATING ORAL at 21:14

## 2021-01-01 RX ADMIN — TAMSULOSIN HYDROCHLORIDE 0.4 MG: 0.4 CAPSULE ORAL at 10:18

## 2021-01-01 RX ADMIN — DEXAMETHASONE SODIUM PHOSPHATE 10.9 MG: 10 INJECTION, SOLUTION INTRAMUSCULAR; INTRAVENOUS at 01:47

## 2021-01-01 RX ADMIN — FAMOTIDINE 20 MG: 20 TABLET ORAL at 18:15

## 2021-01-01 RX ADMIN — HEPARIN SODIUM 5000 UNITS: 5000 INJECTION INTRAVENOUS; SUBCUTANEOUS at 21:31

## 2021-01-01 RX ADMIN — DESMOPRESSIN ACETATE 40 MG: 0.2 TABLET ORAL at 22:09

## 2021-01-01 RX ADMIN — Medication 2000 UNITS: at 08:59

## 2021-01-01 RX ADMIN — ENOXAPARIN SODIUM 100 MG: 100 INJECTION SUBCUTANEOUS at 13:50

## 2021-01-01 RX ADMIN — DEXAMETHASONE SODIUM PHOSPHATE 10.9 MG: 10 INJECTION, SOLUTION INTRAMUSCULAR; INTRAVENOUS at 01:16

## 2021-01-01 RX ADMIN — ZINC SULFATE 220 MG (50 MG) CAPSULE 220 MG: CAPSULE at 08:27

## 2021-01-01 RX ADMIN — SODIUM BICARBONATE 50 MEQ: 84 INJECTION, SOLUTION INTRAVENOUS at 11:58

## 2021-01-01 RX ADMIN — ENOXAPARIN SODIUM 100 MG: 100 INJECTION SUBCUTANEOUS at 00:27

## 2021-01-01 RX ADMIN — ENOXAPARIN SODIUM 100 MG: 100 INJECTION SUBCUTANEOUS at 21:26

## 2021-01-01 RX ADMIN — DESMOPRESSIN ACETATE 40 MG: 0.2 TABLET ORAL at 21:14

## 2021-01-01 RX ADMIN — FAMOTIDINE 20 MG: 20 TABLET ORAL at 07:40

## 2021-01-01 RX ADMIN — FUROSEMIDE 20 MG: 10 INJECTION, SOLUTION INTRAVENOUS at 13:50

## 2021-01-01 RX ADMIN — DEXAMETHASONE SODIUM PHOSPHATE 9.56 MG: 4 INJECTION, SOLUTION INTRA-ARTICULAR; INTRALESIONAL; INTRAMUSCULAR; INTRAVENOUS; SOFT TISSUE at 08:54

## 2021-01-01 RX ADMIN — SENNOSIDES AND DOCUSATE SODIUM 1 TABLET: 8.6; 5 TABLET ORAL at 08:36

## 2021-01-01 RX ADMIN — OLANZAPINE 5 MG: 5 TABLET, ORALLY DISINTEGRATING ORAL at 21:59

## 2021-01-01 RX ADMIN — FAMOTIDINE 20 MG: 20 TABLET ORAL at 17:07

## 2021-01-01 RX ADMIN — WARFARIN SODIUM 2.5 MG: 2.5 TABLET ORAL at 21:22

## 2021-01-01 RX ADMIN — SENNOSIDES AND DOCUSATE SODIUM 1 TABLET: 8.6; 5 TABLET ORAL at 18:21

## 2021-01-01 RX ADMIN — SODIUM CHLORIDE 75 ML/HR: 0.9 INJECTION, SOLUTION INTRAVENOUS at 20:24

## 2021-01-01 RX ADMIN — EPINEPHRINE 10 MCG/MIN: 1 INJECTION, SOLUTION, CONCENTRATE INTRAVENOUS at 10:00

## 2021-01-01 RX ADMIN — SODIUM CHLORIDE 125 ML/HR: 0.9 INJECTION, SOLUTION INTRAVENOUS at 12:44

## 2021-01-01 RX ADMIN — ENOXAPARIN SODIUM 100 MG: 100 INJECTION SUBCUTANEOUS at 21:08

## 2021-01-01 RX ADMIN — Medication 30 MCG/MIN: at 11:00

## 2021-01-01 RX ADMIN — SENNOSIDES AND DOCUSATE SODIUM 1 TABLET: 8.6; 5 TABLET ORAL at 07:36

## 2021-01-01 RX ADMIN — FAMOTIDINE 20 MG: 20 TABLET ORAL at 18:41

## 2021-01-01 RX ADMIN — SENNOSIDES AND DOCUSATE SODIUM 1 TABLET: 8.6; 5 TABLET ORAL at 17:21

## 2021-01-01 RX ADMIN — DESMOPRESSIN ACETATE 40 MG: 0.2 TABLET ORAL at 22:31

## 2021-01-01 RX ADMIN — Medication 10.5 MG: at 22:00

## 2021-01-01 RX ADMIN — DEXAMETHASONE SODIUM PHOSPHATE 10.9 MG: 10 INJECTION, SOLUTION INTRAMUSCULAR; INTRAVENOUS at 12:41

## 2021-02-05 PROBLEM — R77.8 ELEVATED TROPONIN: Status: ACTIVE | Noted: 2021-01-01

## 2021-02-05 PROBLEM — I48.20 CHRONIC ATRIAL FIBRILLATION (HCC): Status: ACTIVE | Noted: 2021-01-01

## 2021-02-05 PROBLEM — U07.1 PNEUMONIA DUE TO COVID-19 VIRUS: Status: ACTIVE | Noted: 2021-01-01

## 2021-02-05 PROBLEM — J12.82 PNEUMONIA DUE TO COVID-19 VIRUS: Status: ACTIVE | Noted: 2021-01-01

## 2021-02-05 PROBLEM — J96.01 ACUTE RESPIRATORY FAILURE WITH HYPOXIA (HCC): Status: ACTIVE | Noted: 2021-01-01

## 2021-02-05 PROBLEM — R79.89 ELEVATED LIVER FUNCTION TESTS: Status: ACTIVE | Noted: 2021-01-01

## 2021-02-05 NOTE — ED NOTES
Provider confirms antibiotics may be given before obtaining urine sample       Kelvin Galindo RN  02/05/21 5218

## 2021-02-05 NOTE — ASSESSMENT & PLAN NOTE
· Patient w/ O2 of 84% on RA  · Mid 90s on 4L NC  · Baseline no O2  · 2/2 COVID 19  · Wean as tolerated

## 2021-02-05 NOTE — ASSESSMENT & PLAN NOTE
· Rate controlled, does not appear to be on BB/CCB  · Anticoagulated w/ coumadin; INR 2 57  · Continue coumadin; daily INR

## 2021-02-05 NOTE — ASSESSMENT & PLAN NOTE
· Troponin 0 09 on admission; denies CP; EKG does show some inverted T waves in lateral leads  · Trend trop  · Maintain telemetry   · Consider cardiology consult if becomes symptomatic

## 2021-02-05 NOTE — H&P
H&P- Cierra Sake 1945, 70 y o  male MRN: [id number]    Unit/Bed#: ED 28 Encounter: [id number]    Primary Care Provider: No primary care provider on file  Date and time admitted to hospital: 2/5/2021 11:04 AM        * Pneumonia due to COVID-19 virus  Assessment & Plan  · Patient w/ hx of SOB over the last few days; associated w/ weakness/fatigue; denies cough/fever/CP/N/V/D  · CXR: bilateral GGO  · O2: 4L NC  · MODERATE pathway  · Rocephin + Doxy-- d/c if PCT negative x2  · Remdesivir 200mg IV x1, then 100mg IV QD x4 doses  · Decadron 6mg IV QD  · Reviewed cardiac markers-- slightly elevated  · Trend inflammatory markers: Ferritin, CRP, D dimer  · AC: continue coumadin   · Encourage self-proning; IS  · Trend O2 as tolerated to maintain saturations >90%    Elevated troponin  Assessment & Plan  · Troponin 0 09 on admission; denies CP; EKG does show some inverted T waves in lateral leads  · Trend trop  · Maintain telemetry   · Consider cardiology consult if becomes symptomatic    Elevated liver function tests  Assessment & Plan  · Likely 2/2 COVID 19  · No abdominal pain   · trend    Chronic atrial fibrillation (HCC)  Assessment & Plan  · Rate controlled, does not appear to be on BB/CCB  · Anticoagulated w/ coumadin; INR 2 57  · Continue coumadin; daily INR    Acute respiratory failure with hypoxia (HCC)  Assessment & Plan  · Patient w/ O2 of 84% on RA  · Mid 90s on 4L NC  · Baseline no O2  · 2/2 COVID 19  · Wean as tolerated      VTE Prophylaxis: Warfarin (Coumadin)  / sequential compression device   Code Status: level 1  POLST: POLST form is not discussed and not completed at this time  Discussion with family: family updated via phone    Anticipated Length of Stay:  Patient will be admitted on an Inpatient basis with an anticipated length of stay of  > 2 midnights  Justification for Hospital Stay: tx COVID 19    Total Time for Visit, including Counseling / Coordination of Care: 30 minutes    Greater than 50% of this total time spent on direct patient counseling and coordination of care  Chief Complaint:   Weakness, shortness of breath    History of Present Illness:    Alma Spence is a 70 y o  male with past medical history significant for chronic atrial fibrillation on anticoagulation with Coumadin, hyperlipidemia, GERD presents to the ED for evaluation of shortness of breath and weakness for the last few days  Patient reports since has been getting worse, prompting ER evaluation  He admits to fatigue/weakness, worsening shortness of breath  Patient has baseline shortness of breath secondary to Occupational Health exposure  Patient denies cough, nausea, vomiting, diarrhea, fevers  Note that patient's wife has similar symptoms is currently admitted to a different hospital   Patient denies any chest pain  He was noted to be hypoxic on arrival, responding well to oxygen  Review of Systems:    Review of Systems   Constitutional: Positive for fatigue  HENT: Negative  Eyes: Negative  Respiratory: Positive for shortness of breath  Cardiovascular: Negative  Gastrointestinal: Negative  Genitourinary: Negative  Musculoskeletal: Negative  Skin: Negative  Neurological: Positive for weakness  Hematological: Negative  Psychiatric/Behavioral: Negative  Past Medical and Surgical History:     Past Medical History:   Diagnosis Date    Irregular heart beat        History reviewed  No pertinent surgical history  Meds/Allergies:    Prior to Admission medications    Medication Sig Start Date End Date Taking?  Authorizing Provider   atorvastatin (LIPITOR) 20 mg tablet Take 10 mg by mouth daily 6/10/20   Historical Provider, MD   omeprazole (PriLOSEC) 20 mg delayed release capsule Take 20 mg by mouth daily 9/9/20   Historical Provider, MD   tamsulosin (FLOMAX) 0 4 mg Take 0 4 mg by mouth daily 9/24/20   Historical Provider, MD     I have reviewed home medications with patient personally  Allergies: No Known Allergies    Social History:     Marital Status: /Civil Union   Occupation: retired  Patient Pre-hospital Living Situation: home w/ wife  Patient Pre-hospital Level of Mobility: independent  Patient Pre-hospital Diet Restrictions: none  Substance Use History:   Social History     Substance and Sexual Activity   Alcohol Use Not Currently     Social History     Tobacco Use   Smoking Status Never Smoker   Smokeless Tobacco Never Used     Social History     Substance and Sexual Activity   Drug Use Never       Family History:    History reviewed  No pertinent family history  Physical Exam:     Vitals:   Blood Pressure: 122/74 (02/05/21 1500)  Pulse: 90 (02/05/21 1500)  Temperature: 100 1 °F (37 8 °C) (02/05/21 1129)  Temp Source: Oral (02/05/21 1129)  Respirations: 20 (02/05/21 1500)  SpO2: 93 % (02/05/21 1500)    Physical Exam  Constitutional:       Appearance: He is ill-appearing  HENT:      Head: Normocephalic  Mouth/Throat:      Mouth: Mucous membranes are dry  Eyes:      Pupils: Pupils are equal, round, and reactive to light  Cardiovascular:      Rate and Rhythm: Rhythm irregular  Heart sounds: Murmur present  No friction rub  No gallop  Comments: Irregular rate  Pulmonary:      Effort: Respiratory distress (tachypnea) present  Breath sounds: No wheezing  Comments: Diminished breath sounds throughout  Abdominal:      General: Abdomen is flat  Palpations: Abdomen is soft  Tenderness: There is no abdominal tenderness  Musculoskeletal: Normal range of motion  Right lower leg: No edema  Left lower leg: No edema  Skin:     General: Skin is warm and dry  Neurological:      General: No focal deficit present  Mental Status: He is alert        Comments: Oriented to self, location; initially reports coming to ED for CXR for 'leaky valves' but then remembers needing COVID 19 testing         Additional Data:     Lab Results: I have personally reviewed pertinent reports  Results from last 7 days   Lab Units 02/05/21  1119   WBC Thousand/uL 6 23   HEMOGLOBIN g/dL 17 2*   HEMATOCRIT % 52 4*   PLATELETS Thousands/uL 219   NEUTROS PCT % 84*   LYMPHS PCT % 8*   MONOS PCT % 7   EOS PCT % 0     Results from last 7 days   Lab Units 02/05/21  1243   SODIUM mmol/L 136   POTASSIUM mmol/L 4 2   CHLORIDE mmol/L 101   CO2 mmol/L 27   BUN mg/dL 25   CREATININE mg/dL 1 30   ANION GAP mmol/L 8   CALCIUM mg/dL 8 3   ALBUMIN g/dL 2 9*   TOTAL BILIRUBIN mg/dL 2 48*   ALK PHOS U/L 133*   ALT U/L 44   AST U/L 86*   GLUCOSE RANDOM mg/dL 109     Results from last 7 days   Lab Units 02/05/21  1119   INR  2 57*             Results from last 7 days   Lab Units 02/05/21  1350   LACTIC ACID mmol/L 1 2       Imaging: I have personally reviewed pertinent reports  XR chest 1 view portable   ED Interpretation by Makeda Fong PA-C (02/05 1325)   Bilateral COVID pneumonia  Final Result by Deonte Mcdonald MD (02/05 1307)      Suspected bilateral Covid pneumonia                  Workstation performed: [id number]             EKG, Pathology, and Other Studies Reviewed on Admission:   · EKG: a fib, T wave inversion V4-6; rate 84    Allscripts / Epic Records Reviewed: Yes     ** Please Note: This note has been constructed using a voice recognition system   **

## 2021-02-05 NOTE — ASSESSMENT & PLAN NOTE
· Patient w/ hx of SOB over the last few days; associated w/ weakness/fatigue; denies cough/fever/CP/N/V/D  · CXR: bilateral GGO  · O2: 4L NC  · MODERATE pathway  · Rocephin + Doxy-- d/c if PCT negative x2  · Remdesivir 200mg IV x1, then 100mg IV QD x4 doses  · Decadron 6mg IV QD  · Reviewed cardiac markers-- slightly elevated  · Trend inflammatory markers: Ferritin, CRP, D dimer  · AC: continue coumadin   · Encourage self-proning; IS  · Trend O2 as tolerated to maintain saturations >90%

## 2021-02-05 NOTE — ED PROVIDER NOTES
History  Chief Complaint   Patient presents with    Shortness of Breath     Pt c/o SOba dn generalized weakness  Patient presents emergency room with complaints of a 2 day history of generalized fatigue  He complains of lightheadedness with change physician  He denies any fever chills  He denies any coughing, nasal congestion, postnasal drip  He denies any nausea or vomiting or diarrhea  He denies any chest or abdominal pain  He does complain of shortness of breath that is worse with activity  He states that he has had shortness of breath intermittently throughout his life that is an occupational exposure  He is not on home oxygen  He came into the emergency room satting 88% on room air  He is placed on 2 L and is presently satting at 91% he states his wife has similar symptoms but she is incontinent of urine and stool  She was taken to the hospital   He has no known COVID exposure  Past medical history-  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, GERD, benign prostatic hypertrophy     Patient is not a smoker  He does not drink alcohol  History provided by:  Patient   used: No    Shortness of Breath  Severity:  Mild  Onset quality:  Gradual  Duration:  2 days  Timing:  Intermittent  Progression:  Waxing and waning  Chronicity:  New  Context: activity    Context: not animal exposure, not emotional upset, not fumes, not known allergens, not occupational exposure, not pollens, not smoke exposure, not strong odors, not URI and not weather changes    Relieved by:  Rest  Worsened by:   Activity and exertion  Associated symptoms: no abdominal pain, no chest pain, no claudication, no cough, no diaphoresis, no ear pain, no fever, no headaches, no hemoptysis, no neck pain, no PND, no rash, no sore throat, no sputum production, no syncope, no swollen glands, no vomiting and no wheezing    Risk factors: obesity    Risk factors: no recent alcohol use, no family hx of DVT, no hx of cancer, no hx of PE/DVT, no prolonged immobilization, no recent surgery and no tobacco use        Prior to Admission Medications   Prescriptions Last Dose Informant Patient Reported? Taking? Omeprazole (PRILOSEC PO)   Yes Yes   Sig: Take 20 mg by mouth daily   atorvastatin (LIPITOR) 20 mg tablet   Yes Yes   Sig: Take 10 mg by mouth daily   lisinopril (ZESTRIL) 20 mg tablet   Yes Yes   Sig: Take 10 mg by mouth every evening   tamsulosin (FLOMAX) 0 4 mg   Yes Yes   Sig: Take 0 4 mg by mouth daily at bedtime   warfarin (COUMADIN) 5 mg tablet   Yes Yes   Sig: Take 5 mg by mouth daily      Facility-Administered Medications: None       Past Medical History:   Diagnosis Date    Irregular heart beat        History reviewed  No pertinent surgical history  History reviewed  No pertinent family history  I have reviewed and agree with the history as documented  E-Cigarette/Vaping     E-Cigarette/Vaping Substances     Social History     Tobacco Use    Smoking status: Never Smoker    Smokeless tobacco: Never Used   Substance Use Topics    Alcohol use: Not Currently    Drug use: Never       Review of Systems   Constitutional: Positive for activity change, appetite change and fatigue  Negative for chills, diaphoresis and fever  HENT: Negative for congestion, ear discharge, ear pain, postnasal drip, rhinorrhea and sore throat  Eyes: Negative for photophobia, pain, discharge, redness and itching  Respiratory: Positive for shortness of breath  Negative for cough, hemoptysis, sputum production, chest tightness and wheezing  Cardiovascular: Negative for chest pain, palpitations, claudication, syncope and PND  Gastrointestinal: Negative for abdominal pain, anal bleeding, blood in stool, diarrhea, nausea and vomiting  Genitourinary: Negative for dysuria, frequency and urgency  Musculoskeletal: Negative for neck pain  Skin: Negative for color change and rash  Neurological: Negative for headaches  Psychiatric/Behavioral: Negative for confusion  All other systems reviewed and are negative  Physical Exam  Physical Exam  Vitals signs and nursing note reviewed  Constitutional:       General: He is not in acute distress  Appearance: He is well-developed  He is not ill-appearing, toxic-appearing or diaphoretic  HENT:      Head: Normocephalic  Mouth/Throat:      Pharynx: No pharyngeal swelling or oropharyngeal exudate  Neck:      Musculoskeletal: Neck supple  Vascular: No JVD  Cardiovascular:      Rate and Rhythm: Normal rate and regular rhythm  Heart sounds: Murmur present  Pulmonary:      Effort: Pulmonary effort is normal       Breath sounds: Normal breath sounds  Chest:      Chest wall: No mass, deformity, tenderness, crepitus or edema  Abdominal:      Palpations: Abdomen is soft  There is no hepatomegaly, splenomegaly or mass  Tenderness: There is no abdominal tenderness  There is no guarding or rebound  Musculoskeletal:      Right lower leg: No edema  Left lower leg: No edema  Lymphadenopathy:      Cervical: No cervical adenopathy  Skin:     General: Skin is warm  Capillary Refill: Capillary refill takes less than 2 seconds  Neurological:      General: No focal deficit present  Mental Status: He is alert     Psychiatric:         Mood and Affect: Mood normal          Behavior: Behavior normal          Vital Signs  ED Triage Vitals   Temperature Pulse Respirations Blood Pressure SpO2   02/05/21 1129 02/05/21 1108 02/05/21 1108 02/05/21 1108 02/05/21 1108   100 1 °F (37 8 °C) 101 20 130/79 (!) 84 %      Temp Source Heart Rate Source Patient Position - Orthostatic VS BP Location FiO2 (%)   02/05/21 1129 02/05/21 1108 02/05/21 1330 02/05/21 1330 --   Oral Monitor Sitting Left arm       Pain Score       02/05/21 1330       No Pain           Vitals:    02/05/21 1330 02/05/21 1400 02/05/21 1500 02/05/21 1600   BP: 123/79 123/77 122/74 115/66   Pulse: 92 92 90 92   Patient Position - Orthostatic VS: Sitting Sitting Sitting Lying         Visual Acuity      ED Medications  Medications   sodium chloride 0 9 % infusion (75 mL/hr Intravenous Rate/Dose Change 2/5/21 1619)   ceftriaxone (ROCEPHIN) 1 g/50 mL in dextrose IVPB (0 mg Intravenous Stopped 2/5/21 1416)   doxycycline hyclate (VIBRAMYCIN) capsule 100 mg (100 mg Oral Given 2/5/21 1343)       Diagnostic Studies  Results Reviewed     Procedure Component Value Units Date/Time    Troponin I [[id number]]  (Abnormal) Collected: 02/05/21 1619    Lab Status: Final result Specimen: Blood from Arm, Left Updated: 02/05/21 1656     Troponin I 0 07 ng/mL     D-dimer, quantitative [[id number]]  (Abnormal) Collected: 02/05/21 1619    Lab Status: Final result Specimen: Blood from Arm, Left Updated: 02/05/21 1637     D-Dimer, Quant 0 85 ug/ml FEU     C-reactive protein [[id number]] Collected: 02/05/21 1619    Lab Status: In process Specimen: Blood from Arm, Left Updated: 02/05/21 1620    Troponin I [[id number]]     Lab Status: No result Specimen: Blood     Ferritin [[id number]]  (Abnormal) Collected: 02/05/21 1243    Lab Status: Final result Specimen: Blood from Arm, Left Updated: 02/05/21 1532     Ferritin 5,406 ng/mL     Lactic acid, plasma [[id number]]  (Normal) Collected: 02/05/21 1350    Lab Status: Final result Specimen: Blood from Arm, Left Updated: 02/05/21 1414     LACTIC ACID 1 2 mmol/L     Narrative:      Result may be elevated if tourniquet was used during collection  COVID19, Influenza A/B, RSV PCR, [facility] [[id number]]  (Abnormal) Collected: 02/05/21 1305    Lab Status: Final result Specimen: Nares from Nasopharyngeal Swab Updated: 02/05/21 1410     SARS-CoV-2 Positive     INFLUENZA A PCR Negative     INFLUENZA B PCR Negative     RSV PCR Negative    Narrative: This test has been authorized by FDA under an EUA (Emergency Use Assay) for use by authorized laboratories    Clinical caution and judgement should be used with the interpretation of these results with consideration of the clinical impression and other laboratory testing  Testing reported as "Positive" or "Negative" has been proven to be accurate according to standard laboratory validation requirements  All testing is performed with control materials showing appropriate reactivity at standard intervals  CKMB [[id number]]  (Normal) Collected: 02/05/21 1243    Lab Status: Final result Specimen: Blood from Arm, Left Updated: 02/05/21 1406     CK-MB Index <1 0 %      CK-MB 1 5 ng/mL     TSH [[id number]]  (Normal) Collected: 02/05/21 1243    Lab Status: Final result Specimen: Blood from Arm, Left Updated: 02/05/21 1353     TSH 3RD GENERATON 0 711 uIU/mL     Narrative:      Patients undergoing fluorescein dye angiography may retain small amounts of fluorescein in the body for 48-72 hours post procedure  Samples containing fluorescein can produce falsely depressed TSH values  If the patient had this procedure,a specimen should be resubmitted post fluorescein clearance        NT-BNP PRO [[id number]]  (Abnormal) Collected: 02/05/21 1243    Lab Status: Final result Specimen: Blood from Arm, Left Updated: 02/05/21 1353     NT-proBNP 1,000 pg/mL     Comprehensive metabolic panel [[id number]]  (Abnormal) Collected: 02/05/21 1243    Lab Status: Final result Specimen: Blood from Arm, Left Updated: 02/05/21 1353     Sodium 136 mmol/L      Potassium 4 2 mmol/L      Chloride 101 mmol/L      CO2 27 mmol/L      ANION GAP 8 mmol/L      BUN 25 mg/dL      Creatinine 1 30 mg/dL      Glucose 109 mg/dL      Calcium 8 3 mg/dL      Corrected Calcium 9 2 mg/dL      AST 86 U/L      ALT 44 U/L      Alkaline Phosphatase 133 U/L      Total Protein 7 1 g/dL      Albumin 2 9 g/dL      Total Bilirubin 2 48 mg/dL      eGFR 54 ml/min/1 73sq m     Narrative:      [place] guidelines for Chronic Kidney Disease (CKD):     Stage 1 with normal or high GFR (GFR > 90 mL/min/1 73 square meters)    Stage 2 Mild CKD (GFR = 60-89 mL/min/1 73 square meters)    Stage 3A Moderate CKD (GFR = 45-59 mL/min/1 73 square meters)    Stage 3B Moderate CKD (GFR = 30-44 mL/min/1 73 square meters)    Stage 4 Severe CKD (GFR = 15-29 mL/min/1 73 square meters)    Stage 5 End Stage CKD (GFR <15 mL/min/1 73 square meters)  Note: GFR calculation is accurate only with a steady state creatinine    Magnesium [[id number]]  (Normal) Collected: 02/05/21 1243    Lab Status: Final result Specimen: Blood from Arm, Left Updated: 02/05/21 1353     Magnesium 2 3 mg/dL     CK (with reflex to MB) [[id number]]  (Abnormal) Collected: 02/05/21 1243    Lab Status: Final result Specimen: Blood from Arm, Left Updated: 02/05/21 1353     Total  U/L     Troponin I [[id number]]  (Abnormal) Collected: 02/05/21 1119    Lab Status: Final result Specimen: Blood from Arm, Left Updated: 02/05/21 1202     Troponin I 0 09 ng/mL     Protime-INR [[id number]]  (Abnormal) Collected: 02/05/21 1119    Lab Status: Final result Specimen: Blood from Arm, Left Updated: 02/05/21 1154     Protime 27 7 seconds      INR 2 57    APTT [[id number]]  (Abnormal) Collected: 02/05/21 1119    Lab Status: Final result Specimen: Blood from Arm, Left Updated: 02/05/21 1154     PTT 46 seconds     CBC and differential [[id number]]  (Abnormal) Collected: 02/05/21 1119    Lab Status: Final result Specimen: Blood from Arm, Left Updated: 02/05/21 1138     WBC 6 23 Thousand/uL      RBC 5 82 Million/uL      Hemoglobin 17 2 g/dL      Hematocrit 52 4 %      MCV 90 fL      MCH 29 6 pg      MCHC 32 8 g/dL      RDW 14 7 %      MPV 8 8 fL      Platelets 494 Thousands/uL      nRBC 0 /100 WBCs      Neutrophils Relative 84 %      Immat GRANS % 1 %      Lymphocytes Relative 8 %      Monocytes Relative 7 %      Eosinophils Relative 0 %      Basophils Relative 0 %      Neutrophils Absolute 5 27 Thousands/µL      Immature Grans Absolute 0 03 Thousand/uL      Lymphocytes Absolute 0 49 Thousands/µL      Monocytes Absolute 0 43 Thousand/µL      Eosinophils Absolute 0 00 Thousand/µL      Basophils Absolute 0 01 Thousands/µL                  XR chest 1 view portable   ED Interpretation by Vessie Siemens, PA-C (02/05 9895)   Bilateral COVID pneumonia  Final Result by Sheridan Wall MD (02/05 5757)      Suspected bilateral Covid pneumonia                  Workstation performed: [id number]                    Procedures  ECG 12 Lead Documentation Only    Date/Time: 2/5/2021 11:29 AM  Performed by: Vessie Siemens, PA-C  Authorized by: Vessie Siemens, PA-C     Indications / Diagnosis:  Dyspnea  ECG reviewed by me, the ED Provider: yes    Patient location:  ED  Previous ECG:     Previous ECG:  Compared to current    Comparison ECG info:  September 25, 2020    Similarity:  Changes noted    Comparison to cardiac monitor: Yes    Interpretation:     Interpretation: abnormal    Rate:     ECG rate:  84    ECG rate assessment: normal    Rhythm:     Rhythm comment:  Accelerated junctional rhythm with premature supraventricular complexes  Ectopy:     Ectopy: none    QRS:     QRS axis:  Normal    QRS intervals:  Normal  Conduction:     Conduction: normal    ST segments:     ST segments:  Non-specific  T waves:     T waves comment:  Biphasic T-waves in the inferior lateral leads suggestive of ischemia  Comments:      New inverted T biphasic T-waves in the lateral leads, V4, V5, V6 that are new suggestive of new ischemia  There is old T-wave inversion in lead to, 3, AVF  Poor R-wave progression, septal infarct cited on or before September 25, 2020      Independently interpreted by me             ED Course                                           MDM  Number of Diagnoses or Management Options  COVID-19: new and requires workup  Dyspnea: new and requires workup  Elevated troponin: new and requires workup  Pneumonia due to COVID-19 virus: new and requires workup     Amount and/or Complexity of Data Reviewed  Clinical lab tests: ordered and reviewed  Tests in the radiology section of CPT®: ordered and reviewed  Tests in the medicine section of CPT®: ordered and reviewed    Risk of Complications, Morbidity, and/or Mortality  Presenting problems: high  Diagnostic procedures: high  Management options: high  General comments: Patient presents emergency room by emergency squad complaining of shortness of breath  He was seen and evaluated  Laboratory studies were taken and were reviewed  Chest x-ray demonstrated multifocal pneumonia  His COVID test was positive  He was treated with a g of Rocephin and doxycycline 100 mg orally  He was admitted to the [facility] service for further evaluation and management  Patient Progress  Patient progress: stable      Disposition  Final diagnoses:   COVID-19   Pneumonia due to COVID-19 virus   Elevated troponin   Dyspnea     Time reflects when diagnosis was documented in both MDM as applicable and the Disposition within this note     Time User Action Codes Description Comment    2/5/2021  3:13 PM Gerardine Shed Add [U07 1] COVID-19     2/5/2021  3:14 PM Gerardine Shed Add [U07 1,  J12 82] Pneumonia due to COVID-19 virus     2/5/2021  3:14 PM Rick Drought [R77 8] Elevated troponin     2/5/2021  3:15 PM Gerardine Shed Add [R06 00] Dyspnea       ED Disposition     ED Disposition Condition Date/Time Comment    Admit Stable Fri Feb 5, 2021  3:13 PM Case was discussed with Evelyn Sharma the patient's admission status was agreed to be Admission Status: inpatient status to the service of Dr [place]   Follow-up Information    None         Patient's Medications   Discharge Prescriptions    No medications on file     No discharge procedures on file      PDMP Review     None          ED Provider  Electronically Signed by           Karl Jimenez PA-C  02/05/21 3499

## 2021-02-05 NOTE — PLAN OF CARE
Problem: PAIN - ADULT  Goal: Verbalizes/displays adequate comfort level or baseline comfort level  Description: Interventions:  - Encourage patient to monitor pain and request assistance  - Assess pain using appropriate pain scale  - Administer analgesics based on type and severity of pain and evaluate response  - Implement non-pharmacological measures as appropriate and evaluate response  - Consider cultural and social influences on pain and pain management  - Notify physician/advanced practitioner if interventions unsuccessful or patient reports new pain  Outcome: Progressing     Problem: INFECTION - ADULT  Goal: Absence or prevention of progression during hospitalization  Description: INTERVENTIONS:  - Assess and monitor for signs and symptoms of infection  - Monitor lab/diagnostic results  - Monitor all insertion sites, i e  indwelling lines, tubes, and drains  - Monitor endotracheal if appropriate and nasal secretions for changes in amount and color  - [place] appropriate cooling/warming therapies per order  - Administer medications as ordered  - Instruct and encourage patient and family to use good hand hygiene technique  - Identify and instruct in appropriate isolation precautions for identified infection/condition  Outcome: Progressing  Goal: Absence of fever/infection during neutropenic period  Description: INTERVENTIONS:  - Monitor WBC    Outcome: Progressing     Problem: SAFETY ADULT  Goal: Patient will remain free of falls  Description: INTERVENTIONS:  - Assess patient frequently for physical needs  -  Identify cognitive and physical deficits and behaviors that affect risk of falls    -  [place] fall precautions as indicated by assessment   - Educate patient/family on patient safety including physical limitations  - Instruct patient to call for assistance with activity based on assessment  - Modify environment to reduce risk of injury  - Consider OT/PT consult to assist with strengthening/mobility  Outcome: Progressing  Goal: Maintain or return to baseline ADL function  Description: INTERVENTIONS:  -  Assess patient's ability to carry out ADLs; assess patient's baseline for ADL function and identify physical deficits which impact ability to perform ADLs (bathing, care of mouth/teeth, toileting, grooming, dressing, etc )  - Assess/evaluate cause of self-care deficits   - Assess range of motion  - Assess patient's mobility; develop plan if impaired  - Assess patient's need for assistive devices and provide as appropriate  - Encourage maximum independence but intervene and supervise when necessary  - Involve family in performance of ADLs  - Assess for home care needs following discharge   - Consider OT consult to assist with ADL evaluation and planning for discharge  - Provide patient education as appropriate  Outcome: Progressing  Goal: Maintain or return mobility status to optimal level  Description: INTERVENTIONS:  - Assess patient's baseline mobility status (ambulation, transfers, stairs, etc )    - Identify cognitive and physical deficits and behaviors that affect mobility  - Identify mobility aids required to assist with transfers and/or ambulation (gait belt, sit-to-stand, lift, walker, cane, etc )  - [place] fall precautions as indicated by assessment  - Record patient progress and toleration of activity level on Mobility SBAR; progress patient to next Phase/Stage  - Instruct patient to call for assistance with activity based on assessment  - Consider rehabilitation consult to assist with strengthening/weightbearing, etc   Outcome: Progressing     Problem: DISCHARGE PLANNING  Goal: Discharge to home or other facility with appropriate resources  Description: INTERVENTIONS:  - Identify barriers to discharge w/patient and caregiver  - Arrange for needed discharge resources and transportation as appropriate  - Identify discharge learning needs (meds, wound care, etc )  - Arrange for interpretive services to assist at discharge as needed  - Refer to Case Management Department for coordinating discharge planning if the patient needs post-hospital services based on physician/advanced practitioner order or complex needs related to functional status, cognitive ability, or social support system  Outcome: Progressing     Problem: Knowledge Deficit  Goal: Patient/family/caregiver demonstrates understanding of disease process, treatment plan, medications, and discharge instructions  Description: Complete learning assessment and assess knowledge base    Interventions:  - Provide teaching at level of understanding  - Provide teaching via preferred learning methods  Outcome: Progressing

## 2021-02-06 NOTE — PLAN OF CARE
Problem: PAIN - ADULT  Goal: Verbalizes/displays adequate comfort level or baseline comfort level  Description: Interventions:  - Encourage patient to monitor pain and request assistance  - Assess pain using appropriate pain scale  - Administer analgesics based on type and severity of pain and evaluate response  - Implement non-pharmacological measures as appropriate and evaluate response  - Consider cultural and social influences on pain and pain management  - Notify physician/advanced practitioner if interventions unsuccessful or patient reports new pain  Outcome: Progressing     Problem: INFECTION - ADULT  Goal: Absence or prevention of progression during hospitalization  Description: INTERVENTIONS:  - Assess and monitor for signs and symptoms of infection  - Monitor lab/diagnostic results  - Monitor all insertion sites, i e  indwelling lines, tubes, and drains  - Monitor endotracheal if appropriate and nasal secretions for changes in amount and color  - [place] appropriate cooling/warming therapies per order  - Administer medications as ordered  - Instruct and encourage patient and family to use good hand hygiene technique  - Identify and instruct in appropriate isolation precautions for identified infection/condition  Outcome: Progressing  Goal: Absence of fever/infection during neutropenic period  Description: INTERVENTIONS:  - Monitor WBC    Outcome: Progressing     Problem: SAFETY ADULT  Goal: Patient will remain free of falls  Description: INTERVENTIONS:  - Assess patient frequently for physical needs  -  Identify cognitive and physical deficits and behaviors that affect risk of falls    -  [place] fall precautions as indicated by assessment   - Educate patient/family on patient safety including physical limitations  - Instruct patient to call for assistance with activity based on assessment  - Modify environment to reduce risk of injury  - Consider OT/PT consult to assist with strengthening/mobility  Outcome: Progressing  Goal: Maintain or return to baseline ADL function  Description: INTERVENTIONS:  -  Assess patient's ability to carry out ADLs; assess patient's baseline for ADL function and identify physical deficits which impact ability to perform ADLs (bathing, care of mouth/teeth, toileting, grooming, dressing, etc )  - Assess/evaluate cause of self-care deficits   - Assess range of motion  - Assess patient's mobility; develop plan if impaired  - Assess patient's need for assistive devices and provide as appropriate  - Encourage maximum independence but intervene and supervise when necessary  - Involve family in performance of ADLs  - Assess for home care needs following discharge   - Consider OT consult to assist with ADL evaluation and planning for discharge  - Provide patient education as appropriate  Outcome: Progressing  Goal: Maintain or return mobility status to optimal level  Description: INTERVENTIONS:  - Assess patient's baseline mobility status (ambulation, transfers, stairs, etc )    - Identify cognitive and physical deficits and behaviors that affect mobility  - Identify mobility aids required to assist with transfers and/or ambulation (gait belt, sit-to-stand, lift, walker, cane, etc )  - [place] fall precautions as indicated by assessment  - Record patient progress and toleration of activity level on Mobility SBAR; progress patient to next Phase/Stage  - Instruct patient to call for assistance with activity based on assessment  - Consider rehabilitation consult to assist with strengthening/weightbearing, etc   Outcome: Progressing     Problem: DISCHARGE PLANNING  Goal: Discharge to home or other facility with appropriate resources  Description: INTERVENTIONS:  - Identify barriers to discharge w/patient and caregiver  - Arrange for needed discharge resources and transportation as appropriate  - Identify discharge learning needs (meds, wound care, etc )  - Arrange for interpretive services to assist at discharge as needed  - Refer to Case Management Department for coordinating discharge planning if the patient needs post-hospital services based on physician/advanced practitioner order or complex needs related to functional status, cognitive ability, or social support system  Outcome: Progressing     Problem: Knowledge Deficit  Goal: Patient/family/caregiver demonstrates understanding of disease process, treatment plan, medications, and discharge instructions  Description: Complete learning assessment and assess knowledge base  Interventions:  - Provide teaching at level of understanding  - Provide teaching via preferred learning methods  Outcome: Progressing     Problem: Potential for Falls  Goal: Patient will remain free of falls  Description: INTERVENTIONS:  - Assess patient frequently for physical needs  -  Identify cognitive and physical deficits and behaviors that affect risk of falls    -  [place] fall precautions as indicated by assessment   - Educate patient/family on patient safety including physical limitations  - Instruct patient to call for assistance with activity based on assessment  - Modify environment to reduce risk of injury  - Consider OT/PT consult to assist with strengthening/mobility  Outcome: Progressing     Problem: Prexisting or High Potential for Compromised Skin Integrity  Goal: Skin integrity is maintained or improved  Description: INTERVENTIONS:  - Identify patients at risk for skin breakdown  - Assess and monitor skin integrity  - Assess and monitor nutrition and hydration status  - Monitor labs   - Assess for incontinence   - Turn and reposition patient  - Assist with mobility/ambulation  - Relieve pressure over bony prominences  - Avoid friction and shearing  - Provide appropriate hygiene as needed including keeping skin clean and dry  - Evaluate need for skin moisturizer/barrier cream  - Collaborate with interdisciplinary team   - Patient/family teaching  - Consider wound care consult   Outcome: Progressing

## 2021-02-06 NOTE — QUICK NOTE
Respiratory placed patient on HFNC as his pulse ox was in the upper 80s on mid-flow with non-rebreather  Per respiratory when he was placed on HFNC at FiO2 100% his pulse ox was reportedly 84% and with non-rebreather improved to 89%  Patient sitting in bed; per nursing has refused to self prone  Patient appears mildly tachypneic but otherwise does not appear to be distressed  Critical care aware, upgrade to level 1 step-down

## 2021-02-06 NOTE — CONSULTS
Ata 70 y o  male MRN: [id number]  Unit/Bed#: S -01 Encounter: [id number]      -------------------------------------------------------------------------------------------------------------  Chief Complaint: SOB - increased Oxygen requirements    History of Present Illness   HX and PE limited by: Jas Valerio is a 70 y o  male with past medical history significant for chronic atrial fibrillation on anticoagulation with Coumadin, hyperlipidemia, GERD presents to the ED for evaluation of shortness of breath and weakness for the last few days  Patient reports since has been getting worse, prompting ER evaluation  He admits to fatigue/weakness, worsening shortness of breath  Patient has baseline shortness of breath secondary to Occupational Health exposure  Patient denies cough, nausea, vomiting, diarrhea, fevers  Note that patient's wife has similar symptoms is currently admitted to a different hospital   Patient denies any chest pain  He was noted to be hypoxic on arrival, responding well to oxygen  History obtained from patient and chart review    -------------------------------------------------------------------------------------------------------------  Assessment and Plan:    Neuro:    Diagnosis: Patient is AAOx1, alert and calm  o Plan: Avoid sedating medications  o Likely baseline dementia with questionable delerium in the setting of acute illness  o Pleasant and conversational but easily confused  o Will try to contact family to confirm baseline  o Frequent neuro checks, reorientation  o Promote sleep-wake cycle     CV:    Diagnosis: Elevated troponin  o Plan: Troponin 0 09 on admission; denies CP; EKG does show some inverted T waves in lateral leads  o Trops overnight 0 09/0 07/0 08 trended down from peak  o Consider repeat EKG this AM   Diagnosis: Chronic atrial fibrillation  o Plan: Rate controlled, does not appear to be on BB/CCB  o Anticoagulated with coumadin; INR 2 57 (2/6)  o Continue coumadin with daily INR      Pulm:   Diagnosis: Acute hypoxic respiratory failure in the setting of Pneumonia due to COVID-19 virus  o Plan: CXR 2/5 GGO  o On HFNC 100% on 40L  o Severe pathway  o Rocephin and Doxy - procal 1 pending  o Remdesivir (day 2 out of 5)  o Dexamethasone 6mg daily - plan to increase to full dose BID  o Trend inflammatory markers: Ferritin, CRP, D-dimer QOD  o AC: continued on coumadin  o Encourage SP, and incentive spirometry  o Wean O2 to maintain saturations > 88-90%      GI:    Diagnosis: Elevated LFTs  o Plan: AST-86, ALT 44, Alk Phos 133 (2/5)  o Will continue to trend, likely elevated in the setting of COVID pneumonia  o Will continue to trend      :    Diagnosis: No active issues  o Plan: Cr 1 3, BUN 25 will continue to trend  o Baseline appears to be 1 0-1 2      F/E/N:    Plan: NaCl at 75 mL plan to discontinue   Replete electrolytes as needed K>4, Phos > 3, Mg>2   Nutrition - Cardiac diet      Heme/Onc:    Diagnosis: No active issues  o Plan: CBC unremarkable, continue on warfarin with daily INR for AC      Endo:    Diagnosis: Monitor sugars in the setting of steroids  o Plan: Glucose 109, 121  o Likely to initiate regular glucose checks      ID:    Diagnosis:   o Plan: COVID 19 Pneumonia   o See as in pulm above      MSK/Skin:    Diagnosis: No active issues   o Plan: Frequent skin exams  o OOB and SP as tolerated    Disposition: Transfer to Stepdown Level 1   Code Status: Level 1 - Full Code  --------------------------------------------------------------------------------------------------------------  Review of Systems   Constitutional: Positive for fatigue  Negative for chills and fever  HENT: Negative for congestion, rhinorrhea and sore throat  Eyes: Negative for visual disturbance  Respiratory: Positive for shortness of breath  Negative for cough, chest tightness and wheezing      Cardiovascular: Negative for chest pain, palpitations and leg swelling  Gastrointestinal: Negative for abdominal pain, constipation, diarrhea, nausea and vomiting  Genitourinary: Negative for difficulty urinating  Skin: Negative for color change  Neurological: Negative for headaches  Psychiatric/Behavioral: Positive for confusion  A 12-point, complete review of systems was reviewed and negative except as stated above     Physical Exam  Constitutional:       General: He is not in acute distress  Appearance: He is ill-appearing  HENT:      Mouth/Throat:      Mouth: Mucous membranes are moist       Pharynx: Oropharynx is clear  Eyes:      General: No scleral icterus  Right eye: No discharge  Left eye: No discharge  Extraocular Movements: Extraocular movements intact  Conjunctiva/sclera: Conjunctivae normal    Cardiovascular:      Rate and Rhythm: Normal rate  Rhythm irregular  Heart sounds: No murmur  No friction rub  No gallop  Pulmonary:      Effort: Pulmonary effort is normal       Breath sounds: Rhonchi present  No wheezing or rales  Abdominal:      General: Bowel sounds are normal       Palpations: Abdomen is soft  Tenderness: There is no abdominal tenderness  There is no guarding or rebound  Musculoskeletal:      Right lower leg: No edema  Left lower leg: No edema  Skin:     General: Skin is warm and dry  Neurological:      General: No focal deficit present  Mental Status: He is alert        Comments: AASantiago   Psychiatric:         Mood and Affect: Mood normal          Behavior: Behavior normal        --------------------------------------------------------------------------------------------------------------  Vitals:   Vitals:    02/05/21 2313 02/06/21 0357 02/06/21 0700 02/06/21 0837   BP:   126/76    BP Location:   Right arm    Pulse:   90    Resp:   18    Temp:   97 6 °F (36 4 °C)    TempSrc:   Oral    SpO2: (!) 89% 90% (!) 88% 96%     Temp  Min: 97 6 °F (36 4 °C)  Max: 100 1 °F (37 8 °C)        There is no height or weight on file to calculate BMI  PAP:  , PAP mean:   , CVP:  , PCWP:   , SvO2:   , ScvO2:  , CO:  , CI:  , SVR:  , SVRI:  , PVR:      Laboratory and Diagnostics:  Results from last 7 days   Lab Units 02/06/21  0543 02/05/21  1119   WBC Thousand/uL 6 04 6 23   HEMOGLOBIN g/dL 15 8 17 2*   HEMATOCRIT % 46 9 52 4*   PLATELETS Thousands/uL 211 219   NEUTROS PCT % 87* 84*   MONOS PCT % 6 7     Results from last 7 days   Lab Units 02/05/21  1243   SODIUM mmol/L 136   POTASSIUM mmol/L 4 2   CHLORIDE mmol/L 101   CO2 mmol/L 27   ANION GAP mmol/L 8   BUN mg/dL 25   CREATININE mg/dL 1 30   CALCIUM mg/dL 8 3   GLUCOSE RANDOM mg/dL 109   ALT U/L 44   AST U/L 86*   ALK PHOS U/L 133*   ALBUMIN g/dL 2 9*   TOTAL BILIRUBIN mg/dL 2 48*     Results from last 7 days   Lab Units 02/05/21  1243   MAGNESIUM mg/dL 2 3      Results from last 7 days   Lab Units 02/05/21  1119   INR  2 57*   PTT seconds 46*      Results from last 7 days   Lab Units 02/05/21  2231 02/05/21  1921 02/05/21  1619 02/05/21  1119   TROPONIN I ng/mL 0 08* 0 08* 0 07* 0 09*     Results from last 7 days   Lab Units 02/05/21  1350   LACTIC ACID mmol/L 1 2     ABG:    VBG:          Micro:        EKG: atrial fibrillation with inverted T waves in lateral leads (2/5)  Imaging: I have personally reviewed pertinent reports  Historical Information   Past Medical History:   Diagnosis Date    Irregular heart beat      History reviewed  No pertinent surgical history  Social History   Social History     Substance and Sexual Activity   Alcohol Use Not Currently     Social History     Substance and Sexual Activity   Drug Use Never     Social History     Tobacco Use   Smoking Status Never Smoker   Smokeless Tobacco Never Used     Exercise History:  Family History:   History reviewed  No pertinent family history    I have reviewed this patient's family history and commented on sigificant items within the HPI      Medications:  Current Facility-Administered Medications   Medication Dose Route Frequency    ascorbic acid (VITAMIN C) tablet 1,000 mg  1,000 mg Oral Q12H [facility]    atorvastatin (LIPITOR) tablet 40 mg  40 mg Oral HS    benzonatate (TESSALON PERLES) capsule 100 mg  100 mg Oral TID PRN    cefTRIAXone (ROCEPHIN) 1,000 mg in dextrose 5 % 50 mL IVPB  1,000 mg Intravenous Q24H    cholecalciferol (VITAMIN D3) tablet 2,000 Units  2,000 Units Oral Daily    dexamethasone (DECADRON) injection 6 mg  6 mg Intravenous Q24H    doxycycline hyclate (VIBRAMYCIN) capsule 100 mg  100 mg Oral Q12H    famotidine (PEPCID) tablet 20 mg  20 mg Oral BID    lisinopril (ZESTRIL) tablet 10 mg  10 mg Oral QPM    zinc sulfate (ZINCATE) capsule 220 mg  220 mg Oral Daily    Followed by   Meme Teran ON 2/13/2021] multivitamin-minerals (CENTRUM ADULTS) tablet 1 tablet  1 tablet Oral Daily    ondansetron (ZOFRAN) injection 4 mg  4 mg Intravenous Q6H PRN    pantoprazole (PROTONIX) EC tablet 20 mg  20 mg Oral Early Morning    remdesivir (Veklury) 100 mg in sodium chloride 0 9 % 250 mL IVPB  100 mg Intravenous Q24H    sodium chloride 0 9 % infusion  75 mL/hr Intravenous Continuous    tamsulosin (FLOMAX) capsule 0 4 mg  0 4 mg Oral Daily    warfarin (COUMADIN) tablet 5 mg  5 mg Oral Daily     Home medications:  Prior to Admission Medications   Prescriptions Last Dose Informant Patient Reported? Taking?    Omeprazole (PRILOSEC PO)   Yes Yes   Sig: Take 20 mg by mouth daily   atorvastatin (LIPITOR) 20 mg tablet   Yes Yes   Sig: Take 10 mg by mouth daily   lisinopril (ZESTRIL) 20 mg tablet   Yes Yes   Sig: Take 10 mg by mouth every evening   tamsulosin (FLOMAX) 0 4 mg   Yes Yes   Sig: Take 0 4 mg by mouth daily at bedtime   warfarin (COUMADIN) 5 mg tablet   Yes Yes   Sig: Take 5 mg by mouth daily      Facility-Administered Medications: None     Allergies:  No Known Allergies  ------------------------------------------------------------------------------------------------------------  Advance Directive and Living Will:      Power of :    POLST:    ------------------------------------------------------------------------------------------------------------  Anticipated Length of Stay is > 2 midnights    Care Time Delivered:         Isidro Colindres DO        Portions of the record may have been created with voice recognition software  Occasional wrong word or "sound a like" substitutions may have occurred due to the inherent limitations of voice recognition software    Read the chart carefully and recognize, using context, where substitutions have occurred

## 2021-02-06 NOTE — PLAN OF CARE
Problem: PAIN - ADULT  Goal: Verbalizes/displays adequate comfort level or baseline comfort level  Description: Interventions:  - Encourage patient to monitor pain and request assistance  - Assess pain using appropriate pain scale  - Administer analgesics based on type and severity of pain and evaluate response  - Implement non-pharmacological measures as appropriate and evaluate response  - Consider cultural and social influences on pain and pain management  - Notify physician/advanced practitioner if interventions unsuccessful or patient reports new pain  Outcome: Progressing     Problem: INFECTION - ADULT  Goal: Absence or prevention of progression during hospitalization  Description: INTERVENTIONS:  - Assess and monitor for signs and symptoms of infection  - Monitor lab/diagnostic results  - Monitor all insertion sites, i e  indwelling lines, tubes, and drains  - Monitor endotracheal if appropriate and nasal secretions for changes in amount and color  - [place] appropriate cooling/warming therapies per order  - Administer medications as ordered  - Instruct and encourage patient and family to use good hand hygiene technique  - Identify and instruct in appropriate isolation precautions for identified infection/condition  Outcome: Progressing  Goal: Absence of fever/infection during neutropenic period  Description: INTERVENTIONS:  - Monitor WBC    Outcome: Progressing     Problem: SAFETY ADULT  Goal: Patient will remain free of falls  Description: INTERVENTIONS:  - Assess patient frequently for physical needs  -  Identify cognitive and physical deficits and behaviors that affect risk of falls    -  [place] fall precautions as indicated by assessment   - Educate patient/family on patient safety including physical limitations  - Instruct patient to call for assistance with activity based on assessment  - Modify environment to reduce risk of injury  - Consider OT/PT consult to assist with strengthening/mobility  Outcome: Progressing  Goal: Maintain or return to baseline ADL function  Description: INTERVENTIONS:  -  Assess patient's ability to carry out ADLs; assess patient's baseline for ADL function and identify physical deficits which impact ability to perform ADLs (bathing, care of mouth/teeth, toileting, grooming, dressing, etc )  - Assess/evaluate cause of self-care deficits   - Assess range of motion  - Assess patient's mobility; develop plan if impaired  - Assess patient's need for assistive devices and provide as appropriate  - Encourage maximum independence but intervene and supervise when necessary  - Involve family in performance of ADLs  - Assess for home care needs following discharge   - Consider OT consult to assist with ADL evaluation and planning for discharge  - Provide patient education as appropriate  Outcome: Progressing  Goal: Maintain or return mobility status to optimal level  Description: INTERVENTIONS:  - Assess patient's baseline mobility status (ambulation, transfers, stairs, etc )    - Identify cognitive and physical deficits and behaviors that affect mobility  - Identify mobility aids required to assist with transfers and/or ambulation (gait belt, sit-to-stand, lift, walker, cane, etc )  - [place] fall precautions as indicated by assessment  - Record patient progress and toleration of activity level on Mobility SBAR; progress patient to next Phase/Stage  - Instruct patient to call for assistance with activity based on assessment  - Consider rehabilitation consult to assist with strengthening/weightbearing, etc   Outcome: Progressing     Problem: DISCHARGE PLANNING  Goal: Discharge to home or other facility with appropriate resources  Description: INTERVENTIONS:  - Identify barriers to discharge w/patient and caregiver  - Arrange for needed discharge resources and transportation as appropriate  - Identify discharge learning needs (meds, wound care, etc )  - Arrange for interpretive services to assist at discharge as needed  - Refer to Case Management Department for coordinating discharge planning if the patient needs post-hospital services based on physician/advanced practitioner order or complex needs related to functional status, cognitive ability, or social support system  Outcome: Progressing     Problem: Knowledge Deficit  Goal: Patient/family/caregiver demonstrates understanding of disease process, treatment plan, medications, and discharge instructions  Description: Complete learning assessment and assess knowledge base  Interventions:  - Provide teaching at level of understanding  - Provide teaching via preferred learning methods  Outcome: Progressing     Problem: Potential for Falls  Goal: Patient will remain free of falls  Description: INTERVENTIONS:  - Assess patient frequently for physical needs  -  Identify cognitive and physical deficits and behaviors that affect risk of falls    -  [place] fall precautions as indicated by assessment   - Educate patient/family on patient safety including physical limitations  - Instruct patient to call for assistance with activity based on assessment  - Modify environment to reduce risk of injury  - Consider OT/PT consult to assist with strengthening/mobility  Outcome: Progressing

## 2021-02-07 NOTE — PROGRESS NOTES
Progress Note - Darby Bear 1945, 70 y o  male MRN: [id number]  Unit/Bed#: S -01 Encounter: [id number]  Primary Care Provider: No primary care provider on file  Date and time admitted to hospital: 2/5/2021 11:04 AM    * Pneumonia due to COVID-19 virus  Assessment & Plan  · Continue severe pathway treatment protocol  ? On HFNC 100%/40L +/- NRB  ? Continue antibiotic coverage for now pending procal  ? Continue Remdesivir (day 3 out of 5)  ? Continue steroids  ? Trend inflammatory markers: Ferritin, CRP, D-dimer QOD for possible Actemra dosing   ? AC: supratherapeutic INR  ? Encourage SP, and incentive spirometry  ? Wean O2 to maintain saturations > 88-90%  ? Precedex and restraints to maintain HFNC, however patient became hypotensive so Precedex D/C'd     Acute respiratory failure with hypoxia (HCC)  Assessment & Plan  · Secondary to COVID PNA  · Continue treatment per protocol  · Continue HFNC +/- NRB to maintain SpO2 >88%     Chronic atrial fibrillation (HCC)  Assessment & Plan  · Currently rate controlled   · Coumadin on hold for supratherapeutic INR  · Follow INR     ----------------------------------------------------------------------------------------  HPI/24hr events: Mr Tata Sherman was examined bedside this AM, with no acute events overnight  Remains on HFNC 80%/45L +/- NRB with sats in the high 80's- low 90's  Pt agitated, now requiring Precedex infusion with soft limb restraints to leave HFNC and NRB in place, however patient became hypotensive with Precedex so it was D/C'd with improvement in blood pressures  Disposition: Continue Stepdown Level 1 level of care   Code Status: Level 1 - Full Code  ---------------------------------------------------------------------------------------  SUBJECTIVE:     Review of Systems   Constitutional: Negative  HENT: Negative  Eyes: Negative  Respiratory: Positive for cough and shortness of breath  Cardiovascular: Negative      Gastrointestinal: Negative  Genitourinary: Negative  Musculoskeletal: Negative  Skin: Negative  Neurological: Negative  Psychiatric/Behavioral: Negative for behavioral problems  Review of systems was reviewed and negative unless stated above in HPI/24-hour events   ---------------------------------------------------------------------------------------  OBJECTIVE    Vitals   Vitals:    21 1650 21 1858 21 2038 21 2210   BP:  118/69  143/77   BP Location:  Right arm  Right arm   Pulse:  102  (!) 108   Resp:  20  22   Temp:  97 6 °F (36 4 °C)  97 7 °F (36 5 °C)   TempSrc:  Axillary  Axillary   SpO2: 97% 97% 97% 94%     Temp (24hrs), Av 8 °F (36 6 °C), Min:97 6 °F (36 4 °C), Max:97 9 °F (36 6 °C)  Current: Temperature: 97 7 °F (36 5 °C)    Respiratory:  SpO2: SpO2: 94 %  Nasal Cannula O2 Flow Rate (L/min): 13 L/min    Invasive/non-invasive ventilation settings   Respiratory    Lab Data (Last 4 hours)    None         O2/Vent Data (Last 4 hours)       0051          Non-Invasive Ventilation Mode HFNC (High flow)                 Physical Exam  Vitals signs and nursing note reviewed  Constitutional:       General: He is awake  Interventions: Nasal cannula in place  Comments: HFNC   HENT:      Head: Normocephalic and atraumatic  Mouth/Throat:      Mouth: Mucous membranes are moist       Pharynx: Oropharynx is clear  Eyes:      Conjunctiva/sclera: Conjunctivae normal       Pupils: Pupils are equal, round, and reactive to light  Neck:      Musculoskeletal: Neck supple  Cardiovascular:      Rate and Rhythm: Normal rate  Rhythm irregularly irregular  Pulmonary:      Effort: Tachypnea present  Breath sounds: Decreased breath sounds present  Abdominal:      Palpations: Abdomen is soft  Tenderness: There is no abdominal tenderness  Musculoskeletal:         General: No deformity  Skin:     General: Skin is warm and dry     Neurological:      General: No focal deficit present  Mental Status: He is alert  GCS: GCS eye subscore is 4  GCS verbal subscore is 5  GCS motor subscore is 6  Psychiatric:         Behavior: Behavior is cooperative  Laboratory and Diagnostics:  Results from last 7 days   Lab Units 02/07/21  0528 02/06/21  0543 02/05/21  1119   WBC Thousand/uL 6 96 6 04 6 23   HEMOGLOBIN g/dL 15 7 15 8 17 2*   HEMATOCRIT % 49 0 46 9 52 4*   PLATELETS Thousands/uL 267 211 219   NEUTROS PCT % 88* 87* 84*   MONOS PCT % 5 6 7     Results from last 7 days   Lab Units 02/07/21  0528 02/06/21  0543 02/05/21  1243   SODIUM mmol/L 138 138 136   POTASSIUM mmol/L 4 4 4 2 4 2   CHLORIDE mmol/L 105 105 101   CO2 mmol/L 24 20* 27   ANION GAP mmol/L 9 13 8   BUN mg/dL 33* 27* 25   CREATININE mg/dL 1 13 1 04 1 30   CALCIUM mg/dL 8 5 8 3 8 3   GLUCOSE RANDOM mg/dL 128 121 109   ALT U/L 41 44 44   AST U/L 62* 76* 86*   ALK PHOS U/L 119* 123* 133*   ALBUMIN g/dL 2 5* 2 5* 2 9*   TOTAL BILIRUBIN mg/dL 1 28* 1 83* 2 48*     Results from last 7 days   Lab Units 02/07/21  0528 02/05/21  1243   MAGNESIUM mg/dL 2 2 2 3   PHOSPHORUS mg/dL 2 5  --       Results from last 7 days   Lab Units 02/07/21  0528 02/06/21  1121 02/05/21  1119   INR  4 14* 3 70* 2 57*   PTT seconds  --   --  46*      Results from last 7 days   Lab Units 02/05/21  2231 02/05/21  1921 02/05/21  1619 02/05/21  1119   TROPONIN I ng/mL 0 08* 0 08* 0 07* 0 09*     Results from last 7 days   Lab Units 02/05/21  1350   LACTIC ACID mmol/L 1 2     VBG:    Results from last 7 days   Lab Units 02/07/21  0528 02/06/21  0543   PROCALCITONIN ng/ml 0 19 0 21     EKG: Telemetry reviewed  Imaging: I have personally reviewed pertinent reports     and I have personally reviewed pertinent films in PACS    Intake and Output  I/O       02/05 0701 - 02/06 0700 02/06 0701 - 02/07 0700    IV Piggyback 50 300    Total Intake 50 300    Urine 400 875    Total Output 400 875    Net -350 -575              Height and Weights There is no height or weight on file to calculate BMI  Weight (last 2 days)     None        Nutrition       Diet Orders   (From admission, onward)             Start     Ordered    02/05/21 2218  Room Service  Once     Question:  Type of Service  Answer:  Room Service - Appropriate with Assistance    02/05/21 2218 02/05/21 1722  Diet Cardiovascular; Cardiac  Diet effective now     Question Answer Comment   Diet Type Cardiovascular    Cardiac Cardiac    RD to adjust diet per protocol?  Yes        02/05/21 1721              Active Medications  Scheduled Meds:  Current Facility-Administered Medications   Medication Dose Route Frequency Provider Last Rate    ascorbic acid  1,000 mg Oral Q12H [address] Aura Mejia PA-C      atorvastatin  40 mg Oral HS Aura Mejia PA-C      benzonatate  100 mg Oral TID PRN Hugh Shah PA-C      cefTRIAXone  1,000 mg Intravenous Q24H Aura Mejia PA-C Stopped (02/07/21 1451)    cholecalciferol  2,000 Units Oral Daily Aura Mejia PA-C      dexamethasone  0 1 mg/kg Intravenous Q12H Claudean Acosta, CRNP Stopped (02/07/21 1600)    dexmedetomidine  0 1-0 7 mcg/kg/hr Intravenous Titrated Angeles Mendoza PA-C Stopped (02/08/21 0056)    doxycycline hyclate  100 mg Oral Q12H Aura Mejia PA-C      famotidine  20 mg Oral BID Aura Mejia PA-C      lisinopril  10 mg Oral QPM Aura Mejia PA-C      zinc sulfate  220 mg Oral Daily Aura Mejia PA-C      Followed by   Jazzmine Lopez ON 2/13/2021] multivitamin-minerals  1 tablet Oral Daily Aura Mejia PA-C      ondansetron  4 mg Intravenous Q6H PRN Aura Mejia PA-C      remdesivir  100 mg Intravenous Q24H Aura Mejia PA-C      tamsulosin  0 4 mg Oral Daily Aura Mejia PA-C       PRN Meds:   benzonatate, 100 mg, TID PRN  ondansetron, 4 mg, Q6H PRN      Invasive Devices Review  Invasive Devices     Peripheral Intravenous Line Peripheral IV 02/05/21 Left Forearm 2 days              ---------------------------------------------------------------------------------------  Care Time Delivered:   No Critical Care time spent     Jonah Martínez PA-C    Portions of the record may have been created with voice recognition software  Occasional wrong word or "sound a like" substitutions may have occurred due to the inherent limitations of voice recognition software    Read the chart carefully and recognize, using context, where substitutions have occurred

## 2021-02-07 NOTE — QUICK NOTE
I was contacted by the team in [place] taking care of Mr Perfecto Nyhan wife  They were able to provide me with contact information for the patient's son Álvaro Mcgee  I have updated on the information sheet  I attempted to call to provide an update and left a message to return my phone call  Son called me back and I updated him on details of patient's hospitalization

## 2021-02-07 NOTE — PROGRESS NOTES
Daily Progress Note - Critical Care   Naren Finley 70 y o  male MRN: [id number]  Unit/Bed#: S -01 Encounter: [id number]        ----------------------------------------------------------------------------------------  HPI/24hr events:  Patient becomes agitated, pulling non-rebreather mask and high-flow nasal cannula     ---------------------------------------------------------------------------------------  SUBJECTIVE  Patient is frustrated, does not understand why he needs to stay in the hospital   Remains on high-flow nasal cannula with non-rebreather face mask  Saturations are in the low 90s  Review of Systems   Constitutional: Negative for fever  Cardiovascular: Negative for leg swelling  Musculoskeletal: Negative for arthralgias  Neurological: Negative for headaches  All other systems reviewed and are negative  Review of systems was reviewed and negative unless stated above in HPI/24-hour events   ---------------------------------------------------------------------------------------  Assessment and Plan:    Neuro:    Diagnosis:  Probable underlying dementia with superimposed delirium in the setting of acute illness  o Plan:  Re orientation, promote sleep-wake cycle  Patient requires soft limb restraints to maintain safety      CV:    Diagnosis:  Non MI troponin elevation due to acute respiratory failure  o Plan:  Continue to monitor on telemetry   Diagnosis:  Chronic atrial fibrillation, rate controlled   Plan:  INR supratherapeutic  Hold Coumadin  Pulm:   Diagnosis:  Acute hypoxic respiratory failure  o Plan:  Remains on high-flow nasal cannula with non-rebreather face mask  Patient is at high risk for requiring intubation, particularly if he cannot comply with noninvasive measures  Goal saturations above 88%  GI:    Diagnosis:  Elevated LFTs  o Plan:  Improving      ID:    Diagnosis:  COVID-19 pneumonia, present on admission    Diagnosed on 2/5/21  o Plan:  Continue severe treatment protocol  On Decadron 0 1 milligram/kilogram b i d , vitamin-C, vitamin-D, zinc, atorvastatin, Pepcid  Follow-up inflammatory markers  I attempted to call the patient's home phone number in hopes of connecting with the patient's son  Patient states that the son is currently living in the home, but I have no confirmation of this  We will work with Case Management to try and find additional family members with him to communicate patient's status  Patient's overall prognosis is guarded and he has high risk of ongoing deterioration  Disposition: Continue Stepdown Level 1 level of care   Code Status: Level 1 - Full Code  ---------------------------------------------------------------------------------------  ICU CORE MEASURES    Prophylaxis   VTE Pharmacologic Prophylaxis: Supratherapeutic INR, Coumadin on hold  VTE Mechanical Prophylaxis: sequential compression device  Stress Ulcer Prophylaxis: Famotidine PO    ABCDE Protocol (if indicated)  Plan to perform spontaneous awakening trial today? Not applicable  Plan to perform spontaneous breathing trial today? Not applicable  Obvious barriers to extubation? Not applicable    Invasive Devices Review  Invasive Devices     Peripheral Intravenous Line            Peripheral IV 21 Left Forearm 1 day              Can any invasive devices be discontinued today?  Not applicable  ---------------------------------------------------------------------------------------  OBJECTIVE    Vitals   Vitals:    21 2324 21 0300 21 0529 21 0703   BP:  113/73  132/96   BP Location:  Right arm  Right arm   Pulse:  94  93   Resp:  (!) 24  22   Temp:  97 9 °F (36 6 °C)  97 8 °F (36 6 °C)   TempSrc:  Oral  Oral   SpO2: 91% (!) 88% (!) 85% 91%     Temp (24hrs), Av 8 °F (36 6 °C), Min:97 5 °F (36 4 °C), Max:97 9 °F (36 6 °C)  Current: Temperature: 97 8 °F (36 6 °C)    Respiratory:  SpO2: SpO2: 91 %  High-flow nasal cannula  Invasive/non-invasive ventilation settings   Respiratory    Lab Data (Last 4 hours)    None         O2/Vent Data (Last 4 hours)      02/07 0529          Non-Invasive Ventilation Mode HFNC (High flow)                   Physical Exam  Constitutional:       Appearance: He is ill-appearing  Comments: Agitated   HENT:      Mouth/Throat:      Mouth: Mucous membranes are moist    Neck:      Musculoskeletal: Neck supple  Vascular: No JVD  Cardiovascular:      Rate and Rhythm: Normal rate  Rhythm irregular  Heart sounds: No murmur  Pulmonary:      Breath sounds: Rales present  Musculoskeletal:      Right lower leg: No edema  Left lower leg: No edema  Neurological:      Mental Status: He is alert  He is disoriented  Psychiatric:         Behavior: Behavior is agitated           Laboratory and Diagnostics:  Results from last 7 days   Lab Units 02/07/21 0528 02/06/21  0543 02/05/21  1119   WBC Thousand/uL 6 96 6 04 6 23   HEMOGLOBIN g/dL 15 7 15 8 17 2*   HEMATOCRIT % 49 0 46 9 52 4*   PLATELETS Thousands/uL 267 211 219   NEUTROS PCT % 88* 87* 84*   MONOS PCT % 5 6 7     Results from last 7 days   Lab Units 02/07/21  0528 02/06/21  0543 02/05/21  1243   SODIUM mmol/L 138 138 136   POTASSIUM mmol/L 4 4 4 2 4 2   CHLORIDE mmol/L 105 105 101   CO2 mmol/L 24 20* 27   ANION GAP mmol/L 9 13 8   BUN mg/dL 33* 27* 25   CREATININE mg/dL 1 13 1 04 1 30   CALCIUM mg/dL 8 5 8 3 8 3   GLUCOSE RANDOM mg/dL 128 121 109   ALT U/L 41 44 44   AST U/L 62* 76* 86*   ALK PHOS U/L 119* 123* 133*   ALBUMIN g/dL 2 5* 2 5* 2 9*   TOTAL BILIRUBIN mg/dL 1 28* 1 83* 2 48*     Results from last 7 days   Lab Units 02/07/21  0528 02/05/21  1243   MAGNESIUM mg/dL 2 2 2 3   PHOSPHORUS mg/dL 2 5  --       Results from last 7 days   Lab Units 02/07/21  0528 02/06/21  1121 02/05/21  1119   INR  4 14* 3 70* 2 57*   PTT seconds  --   --  46*      Results from last 7 days   Lab Units 02/05/21  2231 02/05/21  1921 02/05/21  1619 02/05/21  1119   TROPONIN I ng/mL 0 08* 0 08* 0 07* 0 09*     Results from last 7 days   Lab Units 02/05/21  1350   LACTIC ACID mmol/L 1 2     ABG:    VBG:    Results from last 7 days   Lab Units 02/06/21  0543   PROCALCITONIN ng/ml 0 21     Imaging:  Chest x-ray 2/9/21 shows bilateral infiltrates/ground-glass opacity I have personally reviewed pertinent reports  and I have personally reviewed pertinent films in PACS    Intake and Output  I/O       02/05 0701 - 02/06 0700 02/06 0701 - 02/07 0700 02/07 0701 - 02/08 0700    IV Piggyback 50 300     Total Intake 50 300     Urine 400 875     Total Output 400 875     Net -350 -575                    Height and Weights         There is no height or weight on file to calculate BMI  Weight (last 2 days)     None            Nutrition       Diet Orders   (From admission, onward)             Start     Ordered    02/05/21 2218  Room Service  Once     Question:  Type of Service  Answer:  Room Service - Appropriate with Assistance    02/05/21 2218 02/05/21 1722  Diet Cardiovascular; Cardiac  Diet effective now     Question Answer Comment   Diet Type Cardiovascular    Cardiac Cardiac    RD to adjust diet per protocol?  Yes        02/05/21 1721                  Active Medications  Scheduled Meds:  Current Facility-Administered Medications   Medication Dose Route Frequency Provider Last Rate    ascorbic acid  1,000 mg Oral Q12H [facility] Aura Mejia PA-C      atorvastatin  40 mg Oral HS Aura Mejia PA-C      benzonatate  100 mg Oral TID PRN Yuniel Moreno PA-C      cefTRIAXone  1,000 mg Intravenous Q24H Aura Mejia PA-C 1,000 mg (02/06/21 1441)    cholecalciferol  2,000 Units Oral Daily Aura Mejia PA-C      dexamethasone  0 1 mg/kg Intravenous Q12H SKINNY GAMBLE 10 9 mg (02/07/21 0116)    doxycycline hyclate  100 mg Oral Q12H Aura Mejia PA-C      famotidine  20 mg Oral BID Aura Mejia PA-C      lisinopril  10 mg Oral QPM Aura HALEY KATHIE Mejia      zinc sulfate  220 mg Oral Daily Aura Mejia PA-C      Followed by   Amado Torrez ON 2/13/2021] multivitamin-minerals  1 tablet Oral Daily Aura Mejia PA-C      ondansetron  4 mg Intravenous Q6H PRN Aura Mejia PA-C      remdesivir  100 mg Intravenous Q24H Aura Mejia PA-C      tamsulosin  0 4 mg Oral Daily Aura Mejia PA-C       Continuous Infusions:     PRN Meds:   benzonatate, 100 mg, TID PRN  ondansetron, 4 mg, Q6H PRN        Allergies   No Known Allergies  ---------------------------------------------------------------------------------------  Advance Directive and Living Will:      Power of :    POLST:    ---------------------------------------------------------------------------------------  Care Time Delivered:   No Critical Care time spent     Cecelia Schmitt,       Portions of the record may have been created with voice recognition software  Occasional wrong word or "sound a like" substitutions may have occurred due to the inherent limitations of voice recognition software    Read the chart carefully and recognize, using context, where substitutions have occurred

## 2021-02-07 NOTE — ASSESSMENT & PLAN NOTE
· Secondary to COVID PNA  · Continue treatment per protocol  · Continue HFNC +/- NRB to maintain SpO2 >88%

## 2021-02-07 NOTE — ASSESSMENT & PLAN NOTE
· Continue severe pathway treatment protocol  ? On HFNC 100%/40L +/- NRB  ? Continue antibiotic coverage for now pending procal  ? Continue Remdesivir (day 3 out of 5)  ? Continue steroids  ? Trend inflammatory markers: Ferritin, CRP, D-dimer QOD for possible Actemra dosing   ? AC: supratherapeutic INR  ? Encourage SP, and incentive spirometry  ? Wean O2 to maintain saturations > 88-90%  ?  Precedex and restraints to maintain HFNC, however patient became hypotensive so Precedex D/C'd

## 2021-02-08 NOTE — PROGRESS NOTES
Patient continues to be agitated at this time, pulling at oxygen mask  Precedex gtt initiated as per orders  Will continue to monitor

## 2021-02-08 NOTE — PLAN OF CARE
Problem: PAIN - ADULT  Goal: Verbalizes/displays adequate comfort level or baseline comfort level  Description: Interventions:  - Encourage patient to monitor pain and request assistance  - Assess pain using appropriate pain scale  - Administer analgesics based on type and severity of pain and evaluate response  - Implement non-pharmacological measures as appropriate and evaluate response  - Consider cultural and social influences on pain and pain management  - Notify physician/advanced practitioner if interventions unsuccessful or patient reports new pain  Outcome: Progressing     Problem: INFECTION - ADULT  Goal: Absence or prevention of progression during hospitalization  Description: INTERVENTIONS:  - Assess and monitor for signs and symptoms of infection  - Monitor lab/diagnostic results  - Monitor all insertion sites, i e  indwelling lines, tubes, and drains  - Monitor endotracheal if appropriate and nasal secretions for changes in amount and color  - [place] appropriate cooling/warming therapies per order  - Administer medications as ordered  - Instruct and encourage patient and family to use good hand hygiene technique  - Identify and instruct in appropriate isolation precautions for identified infection/condition  Outcome: Progressing  Goal: Absence of fever/infection during neutropenic period  Description: INTERVENTIONS:  - Monitor WBC    Outcome: Progressing     Problem: SAFETY ADULT  Goal: Patient will remain free of falls  Description: INTERVENTIONS:  - Assess patient frequently for physical needs  -  Identify cognitive and physical deficits and behaviors that affect risk of falls    -  [place] fall precautions as indicated by assessment   - Educate patient/family on patient safety including physical limitations  - Instruct patient to call for assistance with activity based on assessment  - Modify environment to reduce risk of injury  - Consider OT/PT consult to assist with strengthening/mobility  Outcome: Progressing  Goal: Maintain or return to baseline ADL function  Description: INTERVENTIONS:  -  Assess patient's ability to carry out ADLs; assess patient's baseline for ADL function and identify physical deficits which impact ability to perform ADLs (bathing, care of mouth/teeth, toileting, grooming, dressing, etc )  - Assess/evaluate cause of self-care deficits   - Assess range of motion  - Assess patient's mobility; develop plan if impaired  - Assess patient's need for assistive devices and provide as appropriate  - Encourage maximum independence but intervene and supervise when necessary  - Involve family in performance of ADLs  - Assess for home care needs following discharge   - Consider OT consult to assist with ADL evaluation and planning for discharge  - Provide patient education as appropriate  Outcome: Progressing  Goal: Maintain or return mobility status to optimal level  Description: INTERVENTIONS:  - Assess patient's baseline mobility status (ambulation, transfers, stairs, etc )    - Identify cognitive and physical deficits and behaviors that affect mobility  - Identify mobility aids required to assist with transfers and/or ambulation (gait belt, sit-to-stand, lift, walker, cane, etc )  - [place] fall precautions as indicated by assessment  - Record patient progress and toleration of activity level on Mobility SBAR; progress patient to next Phase/Stage  - Instruct patient to call for assistance with activity based on assessment  - Consider rehabilitation consult to assist with strengthening/weightbearing, etc   Outcome: Progressing     Problem: DISCHARGE PLANNING  Goal: Discharge to home or other facility with appropriate resources  Description: INTERVENTIONS:  - Identify barriers to discharge w/patient and caregiver  - Arrange for needed discharge resources and transportation as appropriate  - Identify discharge learning needs (meds, wound care, etc )  - Arrange for interpretive services to assist at discharge as needed  - Refer to Case Management Department for coordinating discharge planning if the patient needs post-hospital services based on physician/advanced practitioner order or complex needs related to functional status, cognitive ability, or social support system  Outcome: Progressing     Problem: Knowledge Deficit  Goal: Patient/family/caregiver demonstrates understanding of disease process, treatment plan, medications, and discharge instructions  Description: Complete learning assessment and assess knowledge base  Interventions:  - Provide teaching at level of understanding  - Provide teaching via preferred learning methods  Outcome: Progressing     Problem: Potential for Falls  Goal: Patient will remain free of falls  Description: INTERVENTIONS:  - Assess patient frequently for physical needs  -  Identify cognitive and physical deficits and behaviors that affect risk of falls    -  [place] fall precautions as indicated by assessment   - Educate patient/family on patient safety including physical limitations  - Instruct patient to call for assistance with activity based on assessment  - Modify environment to reduce risk of injury  - Consider OT/PT consult to assist with strengthening/mobility  Outcome: Progressing     Problem: Prexisting or High Potential for Compromised Skin Integrity  Goal: Skin integrity is maintained or improved  Description: INTERVENTIONS:  - Identify patients at risk for skin breakdown  - Assess and monitor skin integrity  - Assess and monitor nutrition and hydration status  - Monitor labs   - Assess for incontinence   - Turn and reposition patient  - Assist with mobility/ambulation  - Relieve pressure over bony prominences  - Avoid friction and shearing  - Provide appropriate hygiene as needed including keeping skin clean and dry  - Evaluate need for skin moisturizer/barrier cream  - Collaborate with interdisciplinary team   - Patient/family teaching  - Consider wound care consult   Outcome: Progressing

## 2021-02-08 NOTE — PLAN OF CARE
Problem: PAIN - ADULT  Goal: Verbalizes/displays adequate comfort level or baseline comfort level  Description: Interventions:  - Encourage patient to monitor pain and request assistance  - Assess pain using appropriate pain scale  - Administer analgesics based on type and severity of pain and evaluate response  - Implement non-pharmacological measures as appropriate and evaluate response  - Consider cultural and social influences on pain and pain management  - Notify physician/advanced practitioner if interventions unsuccessful or patient reports new pain  Outcome: Progressing     Problem: INFECTION - ADULT  Goal: Absence or prevention of progression during hospitalization  Description: INTERVENTIONS:  - Assess and monitor for signs and symptoms of infection  - Monitor lab/diagnostic results  - Monitor all insertion sites, i e  indwelling lines, tubes, and drains  - Monitor endotracheal if appropriate and nasal secretions for changes in amount and color  - [place] appropriate cooling/warming therapies per order  - Administer medications as ordered  - Instruct and encourage patient and family to use good hand hygiene technique  - Identify and instruct in appropriate isolation precautions for identified infection/condition  Outcome: Progressing  Goal: Absence of fever/infection during neutropenic period  Description: INTERVENTIONS:  - Monitor WBC    Outcome: Progressing     Problem: SAFETY ADULT  Goal: Patient will remain free of falls  Description: INTERVENTIONS:  - Assess patient frequently for physical needs  -  Identify cognitive and physical deficits and behaviors that affect risk of falls    -  [place] fall precautions as indicated by assessment   - Educate patient/family on patient safety including physical limitations  - Instruct patient to call for assistance with activity based on assessment  - Modify environment to reduce risk of injury  - Consider OT/PT consult to assist with strengthening/mobility  Outcome: Progressing  Goal: Maintain or return to baseline ADL function  Description: INTERVENTIONS:  -  Assess patient's ability to carry out ADLs; assess patient's baseline for ADL function and identify physical deficits which impact ability to perform ADLs (bathing, care of mouth/teeth, toileting, grooming, dressing, etc )  - Assess/evaluate cause of self-care deficits   - Assess range of motion  - Assess patient's mobility; develop plan if impaired  - Assess patient's need for assistive devices and provide as appropriate  - Encourage maximum independence but intervene and supervise when necessary  - Involve family in performance of ADLs  - Assess for home care needs following discharge   - Consider OT consult to assist with ADL evaluation and planning for discharge  - Provide patient education as appropriate  Outcome: Progressing  Goal: Maintain or return mobility status to optimal level  Description: INTERVENTIONS:  - Assess patient's baseline mobility status (ambulation, transfers, stairs, etc )    - Identify cognitive and physical deficits and behaviors that affect mobility  - Identify mobility aids required to assist with transfers and/or ambulation (gait belt, sit-to-stand, lift, walker, cane, etc )  - [place] fall precautions as indicated by assessment  - Record patient progress and toleration of activity level on Mobility SBAR; progress patient to next Phase/Stage  - Instruct patient to call for assistance with activity based on assessment  - Consider rehabilitation consult to assist with strengthening/weightbearing, etc   Outcome: Progressing     Problem: DISCHARGE PLANNING  Goal: Discharge to home or other facility with appropriate resources  Description: INTERVENTIONS:  - Identify barriers to discharge w/patient and caregiver  - Arrange for needed discharge resources and transportation as appropriate  - Identify discharge learning needs (meds, wound care, etc )  - Arrange for interpretive services to assist at discharge as needed  - Refer to Case Management Department for coordinating discharge planning if the patient needs post-hospital services based on physician/advanced practitioner order or complex needs related to functional status, cognitive ability, or social support system  Outcome: Progressing     Problem: Knowledge Deficit  Goal: Patient/family/caregiver demonstrates understanding of disease process, treatment plan, medications, and discharge instructions  Description: Complete learning assessment and assess knowledge base  Interventions:  - Provide teaching at level of understanding  - Provide teaching via preferred learning methods  Outcome: Progressing     Problem: Potential for Falls  Goal: Patient will remain free of falls  Description: INTERVENTIONS:  - Assess patient frequently for physical needs  -  Identify cognitive and physical deficits and behaviors that affect risk of falls    -  [place] fall precautions as indicated by assessment   - Educate patient/family on patient safety including physical limitations  - Instruct patient to call for assistance with activity based on assessment  - Modify environment to reduce risk of injury  - Consider OT/PT consult to assist with strengthening/mobility  Outcome: Progressing     Problem: Prexisting or High Potential for Compromised Skin Integrity  Goal: Skin integrity is maintained or improved  Description: INTERVENTIONS:  - Identify patients at risk for skin breakdown  - Assess and monitor skin integrity  - Assess and monitor nutrition and hydration status  - Monitor labs   - Assess for incontinence   - Turn and reposition patient  - Assist with mobility/ambulation  - Relieve pressure over bony prominences  - Avoid friction and shearing  - Provide appropriate hygiene as needed including keeping skin clean and dry  - Evaluate need for skin moisturizer/barrier cream  - Collaborate with interdisciplinary team   - Patient/family teaching  - Consider wound care consult   Outcome: Progressing     Problem: Nutrition/Hydration-ADULT  Goal: Nutrient/Hydration intake appropriate for improving, restoring or maintaining nutritional needs  Description: Monitor and assess patient's nutrition/hydration status for malnutrition  Collaborate with interdisciplinary team and initiate plan and interventions as ordered  Monitor patient's weight and dietary intake as ordered or per policy  Utilize nutrition screening tool and intervene as necessary  Determine patient's food preferences and provide high-protein, high-caloric foods as appropriate       INTERVENTIONS:  - Monitor oral intake, urinary output, labs, and treatment plans  - Assess nutrition and hydration status and recommend course of action  - Evaluate amount of meals eaten  - Assist patient with eating if necessary   - Allow adequate time for meals  - Recommend/ encourage appropriate diets, oral nutritional supplements, and vitamin/mineral supplements  - Order, calculate, and assess calorie counts as needed  - Recommend, monitor, and adjust tube feedings and TPN/PPN based on assessed needs  - Assess need for intravenous fluids  - Provide specific nutrition/hydration education as appropriate  - Include patient/family/caregiver in decisions related to nutrition  Outcome: Progressing

## 2021-02-08 NOTE — PROGRESS NOTES
Patient increasingly agitated at this time  Continues to pull at restraints  AP in call notified and new orders received  Will continue to monitor

## 2021-02-09 NOTE — PLAN OF CARE
Problem: PAIN - ADULT  Goal: Verbalizes/displays adequate comfort level or baseline comfort level  Description: Interventions:  - Encourage patient to monitor pain and request assistance  - Assess pain using appropriate pain scale  - Administer analgesics based on type and severity of pain and evaluate response  - Implement non-pharmacological measures as appropriate and evaluate response  - Consider cultural and social influences on pain and pain management  - Notify physician/advanced practitioner if interventions unsuccessful or patient reports new pain  Outcome: Progressing     Problem: INFECTION - ADULT  Goal: Absence or prevention of progression during hospitalization  Description: INTERVENTIONS:  - Assess and monitor for signs and symptoms of infection  - Monitor lab/diagnostic results  - Monitor all insertion sites, i e  indwelling lines, tubes, and drains  - Monitor endotracheal if appropriate and nasal secretions for changes in amount and color  - [place] appropriate cooling/warming therapies per order  - Administer medications as ordered  - Instruct and encourage patient and family to use good hand hygiene technique  - Identify and instruct in appropriate isolation precautions for identified infection/condition  Outcome: Progressing     Problem: SAFETY ADULT  Goal: Patient will remain free of falls  Description: INTERVENTIONS:  - Assess patient frequently for physical needs  -  Identify cognitive and physical deficits and behaviors that affect risk of falls    -  [place] fall precautions as indicated by assessment   - Educate patient/family on patient safety including physical limitations  - Instruct patient to call for assistance with activity based on assessment  - Modify environment to reduce risk of injury  - Consider OT/PT consult to assist with strengthening/mobility  Outcome: Progressing  Goal: Maintain or return to baseline ADL function  Description: INTERVENTIONS:  -  Assess patient's ability to carry out ADLs; assess patient's baseline for ADL function and identify physical deficits which impact ability to perform ADLs (bathing, care of mouth/teeth, toileting, grooming, dressing, etc )  - Assess/evaluate cause of self-care deficits   - Assess range of motion  - Assess patient's mobility; develop plan if impaired  - Assess patient's need for assistive devices and provide as appropriate  - Encourage maximum independence but intervene and supervise when necessary  - Involve family in performance of ADLs  - Assess for home care needs following discharge   - Consider OT consult to assist with ADL evaluation and planning for discharge  - Provide patient education as appropriate  Outcome: Progressing  Goal: Maintain or return mobility status to optimal level  Description: INTERVENTIONS:  - Assess patient's baseline mobility status (ambulation, transfers, stairs, etc )    - Identify cognitive and physical deficits and behaviors that affect mobility  - Identify mobility aids required to assist with transfers and/or ambulation (gait belt, sit-to-stand, lift, walker, cane, etc )  - [place] fall precautions as indicated by assessment  - Record patient progress and toleration of activity level on Mobility SBAR; progress patient to next Phase/Stage  - Instruct patient to call for assistance with activity based on assessment  - Consider rehabilitation consult to assist with strengthening/weightbearing, etc   Outcome: Progressing     Problem: DISCHARGE PLANNING  Goal: Discharge to home or other facility with appropriate resources  Description: INTERVENTIONS:  - Identify barriers to discharge w/patient and caregiver  - Arrange for needed discharge resources and transportation as appropriate  - Identify discharge learning needs (meds, wound care, etc )  - Arrange for interpretive services to assist at discharge as needed  - Refer to Case Management Department for coordinating discharge planning if the patient needs post-hospital services based on physician/advanced practitioner order or complex needs related to functional status, cognitive ability, or social support system  Outcome: Progressing     Problem: Knowledge Deficit  Goal: Patient/family/caregiver demonstrates understanding of disease process, treatment plan, medications, and discharge instructions  Description: Complete learning assessment and assess knowledge base  Interventions:  - Provide teaching at level of understanding  - Provide teaching via preferred learning methods  Outcome: Progressing     Problem: Potential for Falls  Goal: Patient will remain free of falls  Description: INTERVENTIONS:  - Assess patient frequently for physical needs  -  Identify cognitive and physical deficits and behaviors that affect risk of falls    -  [place] fall precautions as indicated by assessment   - Educate patient/family on patient safety including physical limitations  - Instruct patient to call for assistance with activity based on assessment  - Modify environment to reduce risk of injury  - Consider OT/PT consult to assist with strengthening/mobility  Outcome: Progressing     Problem: Prexisting or High Potential for Compromised Skin Integrity  Goal: Skin integrity is maintained or improved  Description: INTERVENTIONS:  - Identify patients at risk for skin breakdown  - Assess and monitor skin integrity  - Assess and monitor nutrition and hydration status  - Monitor labs   - Assess for incontinence   - Turn and reposition patient  - Assist with mobility/ambulation  - Relieve pressure over bony prominences  - Avoid friction and shearing  - Provide appropriate hygiene as needed including keeping skin clean and dry  - Evaluate need for skin moisturizer/barrier cream  - Collaborate with interdisciplinary team   - Patient/family teaching  - Consider wound care consult   Outcome: Progressing     Problem: Nutrition/Hydration-ADULT  Goal: Nutrient/Hydration intake appropriate for improving, restoring or maintaining nutritional needs  Description: Monitor and assess patient's nutrition/hydration status for malnutrition  Collaborate with interdisciplinary team and initiate plan and interventions as ordered  Monitor patient's weight and dietary intake as ordered or per policy  Utilize nutrition screening tool and intervene as necessary  Determine patient's food preferences and provide high-protein, high-caloric foods as appropriate       INTERVENTIONS:  - Monitor oral intake, urinary output, labs, and treatment plans  - Assess nutrition and hydration status and recommend course of action  - Evaluate amount of meals eaten  - Assist patient with eating if necessary   - Allow adequate time for meals  - Recommend/ encourage appropriate diets, oral nutritional supplements, and vitamin/mineral supplements  - Order, calculate, and assess calorie counts as needed  - Recommend, monitor, and adjust tube feedings and TPN/PPN based on assessed needs  - Assess need for intravenous fluids  - Provide specific nutrition/hydration education as appropriate  - Include patient/family/caregiver in decisions related to nutrition  Outcome: Progressing     Problem: RESPIRATORY - ADULT  Goal: Achieves optimal ventilation and oxygenation  Description: INTERVENTIONS:  - Assess for changes in respiratory status  - Assess for changes in mentation and behavior  - Position to facilitate oxygenation and minimize respiratory effort  - Oxygen administered by appropriate delivery if ordered  - Initiate smoking cessation education as indicated  - Encourage broncho-pulmonary hygiene including cough, deep breathe, Incentive Spirometry  - Assess the need for suctioning and aspirate as needed  - Assess and instruct to report SOB or any respiratory difficulty  - Respiratory Therapy support as indicated  Outcome: Progressing     Problem: METABOLIC, FLUID AND ELECTROLYTES - ADULT  Goal: Electrolytes maintained within normal limits  Description: INTERVENTIONS:  - Monitor labs and assess patient for signs and symptoms of electrolyte imbalances  - Administer electrolyte replacement as ordered  - Monitor response to electrolyte replacements, including repeat lab results as appropriate  - Instruct patient on fluid and nutrition as appropriate  Outcome: Progressing  Goal: Fluid balance maintained  Description: INTERVENTIONS:  - Monitor labs   - Monitor I/O and WT  - Instruct patient on fluid and nutrition as appropriate  - Assess for signs & symptoms of volume excess or deficit  Outcome: Progressing     Problem: SAFETY,RESTRAINT: NV/NON-SELF DESTRUCTIVE BEHAVIOR  Goal: Remains free of harm/injury (restraint for non violent/non self-detsructive behavior)  Description: INTERVENTIONS:  - Instruct patient/family regarding restraint use   - Assess and monitor physiologic and psychological status   - Provide interventions and comfort measures to meet assessed patient needs   - Identify and implement measures to help patient regain control  - Assess readiness for release of restraint   Outcome: Progressing  Goal: Returns to optimal restraint-free functioning  Description: INTERVENTIONS:  - Assess the patient's behavior and symptoms that indicate continued need for restraint  - Identify and implement measures to help patient regain control  - Assess readiness for release of restraint   Outcome: Progressing

## 2021-02-09 NOTE — PROGRESS NOTES
Progress Note - Leopold Jungling 1945, 70 y o  male MRN: [id number]    Unit/Bed#: S -[facility] Encounter: [id number]    Primary Care Provider: No primary care provider on file  Date and time admitted to hospital: 2/5/2021 11:04 AM        * Pneumonia due to COVID-19 virus  Assessment & Plan  · Continue severe pathway treatment protocol  ? On HFNC 90%/40L +/- NRB  ? Antibiotics discontinued due to negative procal x 2  ? Continue Remdesivir (day 4 out of 5)  ? Continue steroids  ? Trend inflammatory markers: Ferritin, CRP, D-dimer QOD for possible Actemra dosing   ? AC: supratherapeutic INR  ? Encourage SP, and incentive spirometry  ? Wean O2 to maintain saturations > 88-90%  ? Precedex and restraints to maintain HFNC, however patient became hypotensive so Precedex D/C'd, resolved with albumin x 1     Acute respiratory failure with hypoxia (HCC)  Assessment & Plan  · Secondary to COVID PNA  · Continue treatment per protocol  · Continue HFNC +/- NRB to maintain SpO2 >88%     Chronic atrial fibrillation (HCC)  Assessment & Plan  · Currently rate controlled   · Coumadin on hold for supratherapeutic INR  · Follow INR         ----------------------------------------------------------------------------------------  HPI/24hr events: Pt became hypotensive on precedex and therefore discontinued  Zyprexa x 1 for agitation  No additional events overnight  Disposition: Continue Stepdown Level 1 level of care   Code Status: Level 1 - Full Code  ---------------------------------------------------------------------------------------  SUBJECTIVE  Patient is alert and pleasant on examination  States I do not even know why I'm here      Review of Systems  A 12 point review systems was completed and is negative  ---------------------------------------------------------------------------------------  OBJECTIVE    Vitals   Vitals:    02/09/21 0303 02/09/21 0332 02/09/21 0345 02/09/21 0700   BP: 109/82  124/72 150/81   BP Location:    Right arm   Pulse: 77  82 101   Resp: (!) 28  (!) 28 (!) 24   Temp: 97 7 °F (36 5 °C)  97 8 °F (36 6 °C) 98 °F (36 7 °C)   TempSrc:   Oral Oral   SpO2:  91% 92% (!) 89%   Weight:         Temp (24hrs), Av 8 °F (36 6 °C), Min:97 6 °F (36 4 °C), Max:98 °F (36 7 °C)  Current: Temperature: 98 °F (36 7 °C)          Respiratory:  SpO2: SpO2: 92 %, SpO2 Activity: SpO2 Activity: At Rest, SpO2 Device: O2 Device: High flow nasal cannula(+NRB), Capnography:    Nasal Cannula O2 Flow Rate (L/min): 13 L/min    Invasive/non-invasive ventilation settings   Respiratory    Lab Data (Last 4 hours)    None         O2/Vent Data (Last 4 hours)       0332          Non-Invasive Ventilation Mode HFNC (High flow)                   Physical Exam  Vitals signs and nursing note reviewed  Constitutional:       General: He is not in acute distress  Appearance: Normal appearance  He is normal weight  He is ill-appearing  HENT:      Head: Normocephalic and atraumatic  Nose: Nose normal       Mouth/Throat:      Mouth: Mucous membranes are moist    Eyes:      General: No scleral icterus  Right eye: No discharge  Left eye: No discharge  Extraocular Movements: Extraocular movements intact  Conjunctiva/sclera: Conjunctivae normal       Pupils: Pupils are equal, round, and reactive to light  Neck:      Musculoskeletal: Normal range of motion and neck supple  No neck rigidity or muscular tenderness  Cardiovascular:      Rate and Rhythm: Normal rate  Rhythm irregular  Pulmonary:      Breath sounds: Normal breath sounds  Comments: Patient oxygenating on high-flow nasal cannula with non-rebreather mask  Abdominal:      General: Abdomen is flat  Palpations: Abdomen is soft  Musculoskeletal:         General: No swelling, tenderness, deformity or signs of injury  Skin:     General: Skin is warm and dry  Coloration: Skin is not jaundiced or pale        Findings: No bruising, erythema, lesion or rash  Neurological:      General: No focal deficit present  Mental Status: He is alert and oriented to person, place, and time  Cranial Nerves: No cranial nerve deficit  Motor: No weakness     Psychiatric:         Mood and Affect: Mood normal          Behavior: Behavior normal          Laboratory and Diagnostics:  Results from last 7 days   Lab Units 02/09/21 0540 02/08/21  0516 02/07/21  0528 02/06/21  0543 02/05/21  1119   WBC Thousand/uL 10 15 11 56* 6 96 6 04 6 23   HEMOGLOBIN g/dL 14 2 15 8 15 7 15 8 17 2*   HEMATOCRIT % 43 5 48 6 49 0 46 9 52 4*   PLATELETS Thousands/uL 313 315 267 211 219   NEUTROS PCT %  --   --  88* 87* 84*   BANDS PCT %  --  2  --   --   --    MONOS PCT %  --   --  5 6 7   MONO PCT %  --  7  --   --   --      Results from last 7 days   Lab Units 02/09/21 0540 02/08/21  0516 02/07/21 0528 02/06/21  0543 02/05/21  1243   SODIUM mmol/L 140 140 138 138 136   POTASSIUM mmol/L 4 5 4 3 4 4 4 2 4 2   CHLORIDE mmol/L 110* 108 105 105 101   CO2 mmol/L 22 24 24 20* 27   ANION GAP mmol/L 8 8 9 13 8   BUN mg/dL 33* 36* 33* 27* 25   CREATININE mg/dL 0 97 1 19 1 13 1 04 1 30   CALCIUM mg/dL 8 6 8 4 8 5 8 3 8 3   GLUCOSE RANDOM mg/dL 113 119 128 121 109   ALT U/L  --   --  41 44 44   AST U/L  --   --  62* 76* 86*   ALK PHOS U/L  --   --  119* 123* 133*   ALBUMIN g/dL  --   --  2 5* 2 5* 2 9*   TOTAL BILIRUBIN mg/dL  --   --  1 28* 1 83* 2 48*     Results from last 7 days   Lab Units 02/09/21 0540 02/08/21  0516 02/07/21  0528 02/05/21  1243   MAGNESIUM mg/dL 2 5 2 3 2 2 2 3   PHOSPHORUS mg/dL  --  2 8 2 5  --       Results from last 7 days   Lab Units 02/07/21  0528 02/06/21  1121 02/05/21  1119   INR  4 14* 3 70* 2 57*   PTT seconds  --   --  46*      Results from last 7 days   Lab Units 02/05/21  2231 02/05/21  1921 02/05/21  1619 02/05/21  1119   TROPONIN I ng/mL 0 08* 0 08* 0 07* 0 09*     Results from last 7 days   Lab Units 02/05/21  1350   LACTIC ACID mmol/L 1  2     ABG:    VBG:    Results from last 7 days   Lab Units 02/07/21  0528 02/06/21  0543   PROCALCITONIN ng/ml 0 19 0 21       Micro        EKG:  From 02/08/2021 - patient is in atrial fibrillation, potential lateral ischemia, nonspecific T-wave abnormality,   Imaging: I have personally reviewed pertinent reports  and I have personally reviewed pertinent films in PACS    Intake and Output  I/O       02/07 0701 - 02/08 0700 02/08 0701 - 02/09 0700 02/09 0701 - 02/10 0700    P  O   220     Blood  433 8     IV Piggyback 321 100     Total Intake(mL/kg) 321 (3 2) 753 8 (7 5)     Urine (mL/kg/hr) 100 (0) 1480 (0 6)     Total Output 100 1480     Net +221 -726  3            Unmeasured Urine Occurrence 2 x            Height and Weights      IBW: -88 kg  Body mass index is 29 38 kg/m²  Weight (last 2 days)     Date/Time   Weight    02/08/21 0700   101 (222 66)    02/08/21 0600   101 (222 66)                Nutrition       Diet Orders   (From admission, onward)             Start     Ordered    02/05/21 2218  Room Service  Once     Question:  Type of Service  Answer:  Room Service - Appropriate with Assistance    02/05/21 2218 02/05/21 1722  Diet Cardiovascular; Cardiac  Diet effective now     Question Answer Comment   Diet Type Cardiovascular    Cardiac Cardiac    RD to adjust diet per protocol?  Yes        02/05/21 1721                  Active Medications  Scheduled Meds:  Current Facility-Administered Medications   Medication Dose Route Frequency Provider Last Rate    ascorbic acid  1,000 mg Oral Q12H [address] Aura Mejia PA-C      atorvastatin  40 mg Oral HS Aura Mejia PA-C      benzonatate  100 mg Oral TID PRN Amanda Wahl PA-C      cholecalciferol  2,000 Units Oral Daily Aura Mejia PA-C      dexamethasone  0 1 mg/kg Intravenous Q12H SKINNY White Stopped (02/09/21 0216)    famotidine  20 mg Oral BID Aura Mejia PA-C      heparin (porcine)  5,000 Units Subcutaneous Q8H [facility] SKINNY Diaz      melatonin  6 mg Oral HS SKINNY Camejo      zinc sulfate  220 mg Oral Daily Aura Mejia PA-C      Followed by   Mathew Crespo ON 2/13/2021] multivitamin-minerals  1 tablet Oral Daily Aura Mejia PA-C      ondansetron  4 mg Intravenous Q6H PRN Aura Mejia PA-C      remdesivir  100 mg Intravenous Q24H Aura Mejia PA-C      tamsulosin  0 4 mg Oral Daily Aura Mejia PA-C       Continuous Infusions:     PRN Meds:   benzonatate, 100 mg, TID PRN  ondansetron, 4 mg, Q6H PRN        Invasive Devices Review  Invasive Devices     Peripheral Intravenous Line            Peripheral IV 02/08/21 Right Antecubital less than 1 day                Rationale for remaining devices: HFNC for continued oxygenation needs   ---------------------------------------------------------------------------------------  Advance Directive and Living Will:      Power of :    POLST:    ---------------------------------------------------------------------------------------  Care Time Delivered:   Upon my evaluation, this patient had a high probability of imminent or life-threatening deterioration due to Acute hypoxic respiratory failure and COVID-19 pneumonia, which required my direct attention, intervention, and personal management  I have personally provided 30 minutes (0745 to 0815) of critical care time, exclusive of procedures, teaching, family meetings, and any prior time recorded by providers other than myself  Montiel PA-C      Portions of the record may have been created with voice recognition software  Occasional wrong word or "sound a like" substitutions may have occurred due to the inherent limitations of voice recognition software    Read the chart carefully and recognize, using context, where substitutions have occurred

## 2021-02-09 NOTE — PROGRESS NOTES
Pt O2 saturation in the low to mid 80's  Pt without distress  High flow 100%/40L  Encouraged pt to prone  Agreeable  Assisted to do so  Saturation immediately improved to 100%  RT at bedside at this time  Will continue to monitor

## 2021-02-09 NOTE — ASSESSMENT & PLAN NOTE
· Continue severe pathway treatment protocol  ? On HFNC 90%/40L +/- NRB  ? Antibiotics discontinued due to negative procal x 2  ? Continue Remdesivir (day 4 out of 5)  ? Continue steroids  ? Trend inflammatory markers: Ferritin, CRP, D-dimer QOD for possible Actemra dosing   ? AC: supratherapeutic INR  ? Encourage SP, and incentive spirometry  ? Wean O2 to maintain saturations > 88-90%  ?  Precedex and restraints to maintain HFNC, however patient became hypotensive so Precedex D/C'd, resolved with albumin x 1

## 2021-02-10 PROBLEM — F05 DELIRIUM DUE TO GENERAL MEDICAL CONDITION: Status: ACTIVE | Noted: 2021-01-01

## 2021-02-10 NOTE — PLAN OF CARE
Problem: PAIN - ADULT  Goal: Verbalizes/displays adequate comfort level or baseline comfort level  Description: Interventions:  - Encourage patient to monitor pain and request assistance  - Assess pain using appropriate pain scale  - Administer analgesics based on type and severity of pain and evaluate response  - Implement non-pharmacological measures as appropriate and evaluate response  - Consider cultural and social influences on pain and pain management  - Notify physician/advanced practitioner if interventions unsuccessful or patient reports new pain  Outcome: Progressing     Problem: INFECTION - ADULT  Goal: Absence or prevention of progression during hospitalization  Description: INTERVENTIONS:  - Assess and monitor for signs and symptoms of infection  - Monitor lab/diagnostic results  - Monitor all insertion sites, i e  indwelling lines, tubes, and drains  - Monitor endotracheal if appropriate and nasal secretions for changes in amount and color  - [place] appropriate cooling/warming therapies per order  - Administer medications as ordered  - Instruct and encourage patient and family to use good hand hygiene technique  - Identify and instruct in appropriate isolation precautions for identified infection/condition  Outcome: Progressing     Problem: SAFETY ADULT  Goal: Patient will remain free of falls  Description: INTERVENTIONS:  - Assess patient frequently for physical needs  -  Identify cognitive and physical deficits and behaviors that affect risk of falls    -  [place] fall precautions as indicated by assessment   - Educate patient/family on patient safety including physical limitations  - Instruct patient to call for assistance with activity based on assessment  - Modify environment to reduce risk of injury  - Consider OT/PT consult to assist with strengthening/mobility  Outcome: Progressing  Goal: Maintain or return to baseline ADL function  Description: INTERVENTIONS:  -  Assess patient's ability to carry out ADLs; assess patient's baseline for ADL function and identify physical deficits which impact ability to perform ADLs (bathing, care of mouth/teeth, toileting, grooming, dressing, etc )  - Assess/evaluate cause of self-care deficits   - Assess range of motion  - Assess patient's mobility; develop plan if impaired  - Assess patient's need for assistive devices and provide as appropriate  - Encourage maximum independence but intervene and supervise when necessary  - Involve family in performance of ADLs  - Assess for home care needs following discharge   - Consider OT consult to assist with ADL evaluation and planning for discharge  - Provide patient education as appropriate  Outcome: Progressing  Goal: Maintain or return mobility status to optimal level  Description: INTERVENTIONS:  - Assess patient's baseline mobility status (ambulation, transfers, stairs, etc )    - Identify cognitive and physical deficits and behaviors that affect mobility  - Identify mobility aids required to assist with transfers and/or ambulation (gait belt, sit-to-stand, lift, walker, cane, etc )  - [place] fall precautions as indicated by assessment  - Record patient progress and toleration of activity level on Mobility SBAR; progress patient to next Phase/Stage  - Instruct patient to call for assistance with activity based on assessment  - Consider rehabilitation consult to assist with strengthening/weightbearing, etc   Outcome: Progressing     Problem: DISCHARGE PLANNING  Goal: Discharge to home or other facility with appropriate resources  Description: INTERVENTIONS:  - Identify barriers to discharge w/patient and caregiver  - Arrange for needed discharge resources and transportation as appropriate  - Identify discharge learning needs (meds, wound care, etc )  - Arrange for interpretive services to assist at discharge as needed  - Refer to Case Management Department for coordinating discharge planning if the patient needs post-hospital services based on physician/advanced practitioner order or complex needs related to functional status, cognitive ability, or social support system  Outcome: Progressing     Problem: Knowledge Deficit  Goal: Patient/family/caregiver demonstrates understanding of disease process, treatment plan, medications, and discharge instructions  Description: Complete learning assessment and assess knowledge base  Interventions:  - Provide teaching at level of understanding  - Provide teaching via preferred learning methods  Outcome: Progressing     Problem: Potential for Falls  Goal: Patient will remain free of falls  Description: INTERVENTIONS:  - Assess patient frequently for physical needs  -  Identify cognitive and physical deficits and behaviors that affect risk of falls    -  [place] fall precautions as indicated by assessment   - Educate patient/family on patient safety including physical limitations  - Instruct patient to call for assistance with activity based on assessment  - Modify environment to reduce risk of injury  - Consider OT/PT consult to assist with strengthening/mobility  Outcome: Progressing     Problem: Prexisting or High Potential for Compromised Skin Integrity  Goal: Skin integrity is maintained or improved  Description: INTERVENTIONS:  - Identify patients at risk for skin breakdown  - Assess and monitor skin integrity  - Assess and monitor nutrition and hydration status  - Monitor labs   - Assess for incontinence   - Turn and reposition patient  - Assist with mobility/ambulation  - Relieve pressure over bony prominences  - Avoid friction and shearing  - Provide appropriate hygiene as needed including keeping skin clean and dry  - Evaluate need for skin moisturizer/barrier cream  - Collaborate with interdisciplinary team   - Patient/family teaching  - Consider wound care consult   Outcome: Progressing     Problem: Nutrition/Hydration-ADULT  Goal: Nutrient/Hydration intake appropriate for improving, restoring or maintaining nutritional needs  Description: Monitor and assess patient's nutrition/hydration status for malnutrition  Collaborate with interdisciplinary team and initiate plan and interventions as ordered  Monitor patient's weight and dietary intake as ordered or per policy  Utilize nutrition screening tool and intervene as necessary  Determine patient's food preferences and provide high-protein, high-caloric foods as appropriate       INTERVENTIONS:  - Monitor oral intake, urinary output, labs, and treatment plans  - Assess nutrition and hydration status and recommend course of action  - Evaluate amount of meals eaten  - Assist patient with eating if necessary   - Allow adequate time for meals  - Recommend/ encourage appropriate diets, oral nutritional supplements, and vitamin/mineral supplements  - Order, calculate, and assess calorie counts as needed  - Recommend, monitor, and adjust tube feedings and TPN/PPN based on assessed needs  - Assess need for intravenous fluids  - Provide specific nutrition/hydration education as appropriate  - Include patient/family/caregiver in decisions related to nutrition  Outcome: Progressing     Problem: RESPIRATORY - ADULT  Goal: Achieves optimal ventilation and oxygenation  Description: INTERVENTIONS:  - Assess for changes in respiratory status  - Assess for changes in mentation and behavior  - Position to facilitate oxygenation and minimize respiratory effort  - Oxygen administered by appropriate delivery if ordered  - Initiate smoking cessation education as indicated  - Encourage broncho-pulmonary hygiene including cough, deep breathe, Incentive Spirometry  - Assess the need for suctioning and aspirate as needed  - Assess and instruct to report SOB or any respiratory difficulty  - Respiratory Therapy support as indicated  Outcome: Progressing     Problem: METABOLIC, FLUID AND ELECTROLYTES - ADULT  Goal: Electrolytes maintained within normal limits  Description: INTERVENTIONS:  - Monitor labs and assess patient for signs and symptoms of electrolyte imbalances  - Administer electrolyte replacement as ordered  - Monitor response to electrolyte replacements, including repeat lab results as appropriate  - Instruct patient on fluid and nutrition as appropriate  Outcome: Progressing  Goal: Fluid balance maintained  Description: INTERVENTIONS:  - Monitor labs   - Monitor I/O and WT  - Instruct patient on fluid and nutrition as appropriate  - Assess for signs & symptoms of volume excess or deficit  Outcome: Progressing     Problem: SAFETY,RESTRAINT: NV/NON-SELF DESTRUCTIVE BEHAVIOR  Goal: Remains free of harm/injury (restraint for non violent/non self-detsructive behavior)  Description: INTERVENTIONS:  - Instruct patient/family regarding restraint use   - Assess and monitor physiologic and psychological status   - Provide interventions and comfort measures to meet assessed patient needs   - Identify and implement measures to help patient regain control  - Assess readiness for release of restraint   Outcome: Progressing  Goal: Returns to optimal restraint-free functioning  Description: INTERVENTIONS:  - Assess the patient's behavior and symptoms that indicate continued need for restraint  - Identify and implement measures to help patient regain control  - Assess readiness for release of restraint   Outcome: Progressing     Problem: SAFETY,RESTRAINT: NV/NON-SELF DESTRUCTIVE BEHAVIOR  Goal: Returns to optimal restraint-free functioning  Description: INTERVENTIONS:  - Assess the patient's behavior and symptoms that indicate continued need for restraint  - Identify and implement measures to help patient regain control  - Assess readiness for release of restraint   Outcome: Progressing

## 2021-02-10 NOTE — OCCUPATIONAL THERAPY NOTE
Occupational Therapy Evaluation     Patient Name: Alma Spence  Today's Date: 2/10/2021  Problem List  Principal Problem:    Pneumonia due to COVID-19 virus  Active Problems:    Acute respiratory failure with hypoxia ([facility] )    Chronic atrial fibrillation ([facility] [facility] )    Delirium due to general medical condition    Past Medical History  Past Medical History:   Diagnosis Date    Irregular heart beat      Past Surgical History  History reviewed  No pertinent surgical history  02/10/21 1515   OT Last Visit   OT Visit Date 02/10/21   Note Type   Note type Evaluation   Restrictions/Precautions   Weight Bearing Precautions Per Order No   Other Precautions Contact/isolation; Airborne/isolation;Cognitive; Chair Alarm; Bed Alarm; Fall Risk;O2;Multiple lines;Telemetry  (non-rebreather)   Pain Assessment   Pain Assessment Tool Pain Assessment not indicated - pt denies pain   Pain Score No Pain   Home Living   Type of Home House   Home Layout Two level;Bed/bath upstairs;1/2 bath on main level;Stairs to enter with rails  (3 MADDIE)   Bathroom Shower/Tub Walk-in shower   Bathroom Toilet Standard   Bathroom Equipment Shower chair   [address] Walker   Additional Comments Pt is a questionable historian, pt stating that he lives alone and then that he lives with his son? Pt reporting using RW at baseline and also completing functional mobility (I)'ly   Prior Function   Lives With Son   Receives Help From Family   ADL Assistance Independent   IADLs Needs assistance   Falls in the last 6 months 1 to 4  (reports none, per chart at least 1)   Vocational Retired   Comments (+)drives?     Lifestyle   Autonomy Pt is a unreliable historian, pt reports living with son in [facility], pt (I) with ADLs and reports some (A) with IADLs, no use of AD at baseline?? 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   ADL Eating Assistance 5  Supervision/Setup   Grooming Assistance 5  Supervision/Setup   Grooming Deficit Increased time to complete;Supervision/safety;Verbal cueing;Brushing hair   UB Bathing Assistance 4  Minimal Assistance   LB Bathing Assistance 3  Moderate Assistance   UB Dressing Assistance 4  Minimal Assistance   LB Dressing Assistance 3  Moderate Assistance   LB Dressing Deficit Increased time to complete;Supervision/safety;Verbal cueing; Thread LLE into pants; Thread RLE into pants  (able to pull over hips )   Toileting Assistance  2  Maximal Assistance   Bed Mobility   Supine to Sit 4  Minimal assistance   Additional items Assist x 1; Increased time required;Verbal cues;LE management   Transfers   Sit to Stand 4  Minimal assistance   Additional items Assist x 1; Increased time required;Verbal cues   Stand to Sit 4  Minimal assistance   Additional items Assist x 1; Increased time required;Verbal cues   Additional Comments use of RW   Functional Mobility   Functional Mobility 4  Minimal assistance   Additional Comments Ax1, side stepping to chair, limited distance due to fatigue  O2 sats remaining 91-93% with activity   Additional items Rolling walker   Balance   Static Sitting Good   Dynamic Sitting Fair +   Static Standing Fair   Dynamic Standing Poor +   Ambulatory Poor +   Activity Tolerance   Activity Tolerance Patient limited by fatigue   Medical Staff Made Aware FRANCY DUNN Assessment   RUE Assessment WFL   LUE Assessment   LUE Assessment WFL   Hand Function   Gross Motor Coordination Functional   Fine Motor Coordination Functional   Sensation   Light Touch No apparent deficits   Sharp/Dull No apparent deficits   Cognition   Overall Cognitive Status Impaired   Arousal/Participation Alert; Cooperative   Attention Attends with cues to redirect   Orientation Level Oriented to person;Disoriented to place; Disoriented to time;Disoriented to situation  (Pt believing that he has been in hospital for 3 weeks) Memory Decreased recall of precautions;Decreased recall of recent events;Decreased short term memory   Following Commands Follows one step commands with increased time or repetition   Comments Pt with decreased insights into deficits, poor memory, poor problem solving   Assessment   Limitation Decreased ADL status; Decreased UE strength;Decreased Safe judgement during ADL;Decreased cognition;Decreased endurance;Decreased self-care trans;Decreased high-level ADLs   Prognosis Good   Assessment Patient is a 70 y o  male admitted to [address] [place] [address] on 2/5/2021 due to Pneumonia due to COVID-19 virus  Comorbidities affecting pt's physical performance at time of assessment include respiratory failure with hypoxia, a fib, delirium due to medical condition  Patient has active OT orders and activity orders for Up with assistance  Pt is a unreliable historian, pt reports living with son in [facility], pt (I) with ADLs and reports some (A) with IADLs, no use of AD at baseline?? (+)falls, (+)drives  Personal factors affecting pt at time of IE include:steps to enter environment, difficulty performing ADLS, difficulty performing IADLS , limited insight into deficits and decreased initiation and engagement   At the time of evaluation patient currently requires (S)-min A for UB ADLs, mod A for LB ADLs, min A for functional transfers, and min A for functional mobility  The following deficits affected patient's occupational performance weakness, decreased functional strength, decreased functional balance, decreased activity tolerance, decreased safety awareness, impaired attention, impaired memory, impaired sequencing, impaired problem solving, impaired interpersonal skills and decreased coping skills  Patient would benefit from skilled OT services while in the hospital to address above deficits   Occupational performance areas to be addressed include ADL retraining, bed mobility, functional transfer training, endurance training, cognitive reorientation, patient/family training, equipment evaluation/education, compensatory technique education, energy conservation, activity engagement and activity tolerance in order to maximize patient's level of function  The patient's raw score on the AM-PAC Daily Activity inpatient short form is 15, standardized score is 34 69, less than 39 4  Patients at this level are likely to benefit from DC to post-acute rehabilitation services  From OT standpoint recommend Post Acute Rehabilitation upon D/C  Will continue to follow pt 3-5x/week to address the goals listed below evaluation  Goals   Patient Goals to sit up in chair   LTG Time Frame 10-14   Long Term Goal see goals listed below   Plan   Treatment Interventions ADL retraining;Functional transfer training;UE strengthening/ROM; Endurance training;Patient/family training;Equipment evaluation/education; Compensatory technique education; Energy conservation; Activityengagement;Cognitive reorientation   Goal Expiration Date 02/24/21   OT Treatment Day 0   OT Frequency 3-5x/wk   Recommendation   OT Discharge Recommendation Post-Acute Rehabilitation Services   [facility] Daily Activity Inpatient   Lower Body Dressing 2   Bathing 2   Toileting 2   Upper Body Dressing 3   Grooming 3   Eating 3   Daily Activity Raw Score 15   Daily Activity Standardized Score (Calc for Raw Score >=11) 34 69   -[facility] Applied Cognition Inpatient   Following a Speech/Presentation 1   Understanding Ordinary Conversation 1   Taking Medications 1   Remembering Where Things Are Placed or Put Away 1   Remembering List of 4-5 Errands 1   Taking Care of Complicated Tasks 1   Applied Cognition Raw Score 6   Applied Cognition Standardized Score 7 69          Goals    -Patient will complete UB ADLs w/ mod I using AE and AD as needed    -Patient will complete LB ADLs w/ mod I using AE and AD as needed    -Patient will complete toileting w/ mod I w/ G hygiene/thoroughness    -Patient will complete bed mobility with Mod I without use of bed rails    -Patient will tolerate therapeutic activities for greater than 15 min, in order to increase tolerance for functional activities      -Patient will perform functional transfers with Mod I to/from all surfaces using DME as needed    -Patient will complete functional mobility during ADL/IADL/leisure tasks with Mod I     -Patient will follow multistep instructions with no cuing to increase cognitive function and independence with tasks     -Patient will demonstrate 100% carryover of energy conservation techniques t/o functional I/ADL/leisure tasks w/o cues s/p skilled education to increase endurance during functional tasks    -Patient will be attentive 100% of the time during ongoing cognitive assessment w/ G participation to assist w/ safe d/c planning/recommendations        At the end of the session, all needs met and pt seated in bedside chair, chair alarm activated and call bell within reach    [facility], OTR/L

## 2021-02-10 NOTE — ASSESSMENT & PLAN NOTE
· Patient became bradycardic with Precedex, discontinued  · Caution with antipsychotic medications with most recent QTC of 508  · Improved with sleep hygiene and melatonin  · Frequently reorient and reassure

## 2021-02-10 NOTE — QUICK NOTE
Spoke with pt's son, Jaquan Avina, to provide family update  Pt's son also recently tested positive for Covid 19  All questions answered to pt's family's satisfaction

## 2021-02-10 NOTE — CASE MANAGEMENT
CM LMOVM for Patient's son, All Scott, to complete CM assessment; requested a return call  CM dept will continue to follow and re-attempt as needed

## 2021-02-10 NOTE — PLAN OF CARE
Problem: PAIN - ADULT  Goal: Verbalizes/displays adequate comfort level or baseline comfort level  Description: Interventions:  - Encourage patient to monitor pain and request assistance  - Assess pain using appropriate pain scale  - Administer analgesics based on type and severity of pain and evaluate response  - Implement non-pharmacological measures as appropriate and evaluate response  - Consider cultural and social influences on pain and pain management  - Notify physician/advanced practitioner if interventions unsuccessful or patient reports new pain  Outcome: Progressing     Problem: INFECTION - ADULT  Goal: Absence or prevention of progression during hospitalization  Description: INTERVENTIONS:  - Assess and monitor for signs and symptoms of infection  - Monitor lab/diagnostic results  - Monitor all insertion sites, i e  indwelling lines, tubes, and drains  - Monitor endotracheal if appropriate and nasal secretions for changes in amount and color  - [place] appropriate cooling/warming therapies per order  - Administer medications as ordered  - Instruct and encourage patient and family to use good hand hygiene technique  - Identify and instruct in appropriate isolation precautions for identified infection/condition  Outcome: Progressing     Problem: SAFETY ADULT  Goal: Patient will remain free of falls  Description: INTERVENTIONS:  - Assess patient frequently for physical needs  -  Identify cognitive and physical deficits and behaviors that affect risk of falls    -  [place] fall precautions as indicated by assessment   - Educate patient/family on patient safety including physical limitations  - Instruct patient to call for assistance with activity based on assessment  - Modify environment to reduce risk of injury  - Consider OT/PT consult to assist with strengthening/mobility  Outcome: Progressing  Goal: Maintain or return to baseline ADL function  Description: INTERVENTIONS:  -  Assess patient's ability to carry out ADLs; assess patient's baseline for ADL function and identify physical deficits which impact ability to perform ADLs (bathing, care of mouth/teeth, toileting, grooming, dressing, etc )  - Assess/evaluate cause of self-care deficits   - Assess range of motion  - Assess patient's mobility; develop plan if impaired  - Assess patient's need for assistive devices and provide as appropriate  - Encourage maximum independence but intervene and supervise when necessary  - Involve family in performance of ADLs  - Assess for home care needs following discharge   - Consider OT consult to assist with ADL evaluation and planning for discharge  - Provide patient education as appropriate  Outcome: Progressing  Goal: Maintain or return mobility status to optimal level  Description: INTERVENTIONS:  - Assess patient's baseline mobility status (ambulation, transfers, stairs, etc )    - Identify cognitive and physical deficits and behaviors that affect mobility  - Identify mobility aids required to assist with transfers and/or ambulation (gait belt, sit-to-stand, lift, walker, cane, etc )  - [place] fall precautions as indicated by assessment  - Record patient progress and toleration of activity level on Mobility SBAR; progress patient to next Phase/Stage  - Instruct patient to call for assistance with activity based on assessment  - Consider rehabilitation consult to assist with strengthening/weightbearing, etc   Outcome: Progressing     Problem: DISCHARGE PLANNING  Goal: Discharge to home or other facility with appropriate resources  Description: INTERVENTIONS:  - Identify barriers to discharge w/patient and caregiver  - Arrange for needed discharge resources and transportation as appropriate  - Identify discharge learning needs (meds, wound care, etc )  - Arrange for interpretive services to assist at discharge as needed  - Refer to Case Management Department for coordinating discharge planning if the patient needs post-hospital services based on physician/advanced practitioner order or complex needs related to functional status, cognitive ability, or social support system  Outcome: Progressing     Problem: Knowledge Deficit  Goal: Patient/family/caregiver demonstrates understanding of disease process, treatment plan, medications, and discharge instructions  Description: Complete learning assessment and assess knowledge base  Interventions:  - Provide teaching at level of understanding  - Provide teaching via preferred learning methods  Outcome: Progressing     Problem: Potential for Falls  Goal: Patient will remain free of falls  Description: INTERVENTIONS:  - Assess patient frequently for physical needs  -  Identify cognitive and physical deficits and behaviors that affect risk of falls    -  [place] fall precautions as indicated by assessment   - Educate patient/family on patient safety including physical limitations  - Instruct patient to call for assistance with activity based on assessment  - Modify environment to reduce risk of injury  - Consider OT/PT consult to assist with strengthening/mobility  Outcome: Progressing     Problem: Prexisting or High Potential for Compromised Skin Integrity  Goal: Skin integrity is maintained or improved  Description: INTERVENTIONS:  - Identify patients at risk for skin breakdown  - Assess and monitor skin integrity  - Assess and monitor nutrition and hydration status  - Monitor labs   - Assess for incontinence   - Turn and reposition patient  - Assist with mobility/ambulation  - Relieve pressure over bony prominences  - Avoid friction and shearing  - Provide appropriate hygiene as needed including keeping skin clean and dry  - Evaluate need for skin moisturizer/barrier cream  - Collaborate with interdisciplinary team   - Patient/family teaching  - Consider wound care consult   Outcome: Progressing     Problem: Nutrition/Hydration-ADULT  Goal: Nutrient/Hydration intake appropriate for improving, restoring or maintaining nutritional needs  Description: Monitor and assess patient's nutrition/hydration status for malnutrition  Collaborate with interdisciplinary team and initiate plan and interventions as ordered  Monitor patient's weight and dietary intake as ordered or per policy  Utilize nutrition screening tool and intervene as necessary  Determine patient's food preferences and provide high-protein, high-caloric foods as appropriate       INTERVENTIONS:  - Monitor oral intake, urinary output, labs, and treatment plans  - Assess nutrition and hydration status and recommend course of action  - Evaluate amount of meals eaten  - Assist patient with eating if necessary   - Allow adequate time for meals  - Recommend/ encourage appropriate diets, oral nutritional supplements, and vitamin/mineral supplements  - Order, calculate, and assess calorie counts as needed  - Recommend, monitor, and adjust tube feedings and TPN/PPN based on assessed needs  - Assess need for intravenous fluids  - Provide specific nutrition/hydration education as appropriate  - Include patient/family/caregiver in decisions related to nutrition  Outcome: Progressing     Problem: RESPIRATORY - ADULT  Goal: Achieves optimal ventilation and oxygenation  Description: INTERVENTIONS:  - Assess for changes in respiratory status  - Assess for changes in mentation and behavior  - Position to facilitate oxygenation and minimize respiratory effort  - Oxygen administered by appropriate delivery if ordered  - Initiate smoking cessation education as indicated  - Encourage broncho-pulmonary hygiene including cough, deep breathe, Incentive Spirometry  - Assess the need for suctioning and aspirate as needed  - Assess and instruct to report SOB or any respiratory difficulty  - Respiratory Therapy support as indicated  Outcome: Progressing     Problem: METABOLIC, FLUID AND ELECTROLYTES - ADULT  Goal: Electrolytes maintained within normal limits  Description: INTERVENTIONS:  - Monitor labs and assess patient for signs and symptoms of electrolyte imbalances  - Administer electrolyte replacement as ordered  - Monitor response to electrolyte replacements, including repeat lab results as appropriate  - Instruct patient on fluid and nutrition as appropriate  Outcome: Progressing  Goal: Fluid balance maintained  Description: INTERVENTIONS:  - Monitor labs   - Monitor I/O and WT  - Instruct patient on fluid and nutrition as appropriate  - Assess for signs & symptoms of volume excess or deficit  Outcome: Progressing     Problem: SAFETY,RESTRAINT: NV/NON-SELF DESTRUCTIVE BEHAVIOR  Goal: Remains free of harm/injury (restraint for non violent/non self-detsructive behavior)  Description: INTERVENTIONS:  - Instruct patient/family regarding restraint use   - Assess and monitor physiologic and psychological status   - Provide interventions and comfort measures to meet assessed patient needs   - Identify and implement measures to help patient regain control  - Assess readiness for release of restraint   Outcome: Progressing  Goal: Returns to optimal restraint-free functioning  Description: INTERVENTIONS:  - Assess the patient's behavior and symptoms that indicate continued need for restraint  - Identify and implement measures to help patient regain control  - Assess readiness for release of restraint   Outcome: Progressing

## 2021-02-10 NOTE — PLAN OF CARE
Problem: OCCUPATIONAL THERAPY ADULT  Goal: Performs self-care activities at highest level of function for planned discharge setting  See evaluation for individualized goals  Description: Treatment Interventions: ADL retraining, Functional transfer training, UE strengthening/ROM, Endurance training, Patient/family training, Equipment evaluation/education, Compensatory technique education, Energy conservation, Activityengagement, Cognitive reorientation          See flowsheet documentation for full assessment, interventions and recommendations  Note: Limitation: Decreased ADL status, Decreased UE strength, Decreased Safe judgement during ADL, Decreased cognition, Decreased endurance, Decreased self-care trans, Decreased high-level ADLs  Prognosis: Good  Assessment: Patient is a 70 y o  male admitted to [address] [place] [address] on 2/5/2021 due to Pneumonia due to COVID-19 virus  Comorbidities affecting pt's physical performance at time of assessment include respiratory failure with hypoxia, a fib, delirium due to medical condition  Patient has active OT orders and activity orders for Up with assistance  Pt is a unreliable historian, pt reports living with son in [facility], pt (I) with ADLs and reports some (A) with IADLs, no use of AD at baseline?? (+)falls, (+)drives  Personal factors affecting pt at time of IE include:steps to enter environment, difficulty performing ADLS, difficulty performing IADLS , limited insight into deficits and decreased initiation and engagement   At the time of evaluation patient currently requires (S)-min A for UB ADLs, mod A for LB ADLs, min A for functional transfers, and min A for functional mobility   The following deficits affected patient's occupational performance weakness, decreased functional strength, decreased functional balance, decreased activity tolerance, decreased safety awareness, impaired attention, impaired memory, impaired sequencing, impaired problem solving, impaired interpersonal skills and decreased coping skills  Patient would benefit from skilled OT services while in the hospital to address above deficits  Occupational performance areas to be addressed include ADL retraining, bed mobility, functional transfer training, endurance training, cognitive reorientation, patient/family training, equipment evaluation/education, compensatory technique education, energy conservation, activity engagement and activity tolerance in order to maximize patient's level of function  The patient's raw score on the AM-PAC Daily Activity inpatient short form is 15, standardized score is 34 69, less than 39 4  Patients at this level are likely to benefit from DC to post-acute rehabilitation services  From OT standpoint recommend Post Acute Rehabilitation upon D/C  Will continue to follow pt 3-5x/week to address the goals listed below evaluation        OT Discharge Recommendation: Post-Acute Rehabilitation Services

## 2021-02-10 NOTE — PROGRESS NOTES
Progress Note - Mohit Patterson 1945, 70 y o  male MRN: [id number]    Unit/Bed#: S -01 Encounter: [id number]    Primary Care Provider: No primary care provider on file  Date and time admitted to hospital: 2/5/2021 11:04 AM        * Pneumonia due to COVID-19 virus  Assessment & Plan  · Continue severe pathway treatment protocol  ? On HFNC 100%/40L +/- NRB   ? Antibiotics discontinued due to negative procal x 2  ? Completed course of Remdesivir   ? Continue steroids  ? Trend inflammatory markers: Ferritin, CRP, D-dimer QOD for possible Actemra dosing   ? AC: supratherapeutic INR  ? Encourage SP, and incentive spirometry  ? Wean O2 to maintain saturations > 88-90%      Acute respiratory failure with hypoxia (HCC)  Assessment & Plan  · Secondary to COVID PNA  · Continue treatment per protocol  · Continue HFNC +/- NRB to maintain SpO2 >88%     Chronic atrial fibrillation (HCC)  Assessment & Plan  · Currently rate controlled   · Coumadin on hold for supratherapeutic INR  · Follow INR     Delirium due to general medical condition  Assessment & Plan  · Patient became bradycardic with Precedex, discontinued  · Caution with antipsychotic medications with most recent QTC of 508  · Improved with sleep hygiene and melatonin  · Frequently reorient and reassure      ----------------------------------------------------------------------------------------  HPI/24hr events: Pt with continued elevated INR, improved to 3 9 s/p 5mg Vitamin K on 2/9  Patient continues to require 100% FiO2 on 40 liters/minute on high-flow nasal cannula  Patient slept well with no episodes of delirium overnight  Disposition: Continue Stepdown Level 1 level of care   Code Status: Level 1 - Full Code  ---------------------------------------------------------------------------------------  SUBJECTIVE  Patient is lying in bed in no acute distress  He is well appearing and denies complaints  High-flow nasal cannula in place      Review of Systems  A 12 point review systems was completed and is negative  ---------------------------------------------------------------------------------------  OBJECTIVE    Vitals   Vitals:    02/10/21 0300 02/10/21 0329 02/10/21 0600 02/10/21 0646   BP: 128/58   114/71   BP Location: Right arm   Right arm   Pulse: 76   87   Resp: (!) 26   (!) 26   Temp: 97 5 °F (36 4 °C)   97 7 °F (36 5 °C)   TempSrc: Oral   Oral   SpO2: 98% 94%  97%   Weight:   101 kg (222 lb 3 6 oz)      Temp (24hrs), Av 6 °F (36 4 °C), Min:96 7 °F (35 9 °C), Max:98 2 °F (36 8 °C)  Current: Temperature: 97 7 °F (36 5 °C)          Respiratory:  SpO2: SpO2: (!) 86 %  , SpO2 Activity: SpO2 Activity: At Rest, SpO2 Device: O2 Device: High flow nasal cannula  Nasal Cannula O2 Flow Rate (L/min): 13 L/min    Invasive/non-invasive ventilation settings   Respiratory    Lab Data (Last 4 hours)    None         O2/Vent Data (Last 4 hours)      02/10 0329          Non-Invasive Ventilation Mode HFNC (High flow)                   Physical Exam  Vitals signs and nursing note reviewed  Constitutional:       General: He is not in acute distress  Appearance: Normal appearance  He is normal weight  He is not ill-appearing, toxic-appearing or diaphoretic  HENT:      Head: Normocephalic and atraumatic  Nose: Nose normal  No congestion or rhinorrhea  Mouth/Throat:      Mouth: Mucous membranes are moist    Eyes:      General: No scleral icterus  Right eye: No discharge  Left eye: No discharge  Extraocular Movements: Extraocular movements intact  Conjunctiva/sclera: Conjunctivae normal       Pupils: Pupils are equal, round, and reactive to light  Neck:      Musculoskeletal: Normal range of motion and neck supple  Cardiovascular:      Rate and Rhythm: Normal rate and regular rhythm  Pulses: Normal pulses  Pulmonary:      Effort: Pulmonary effort is normal  No respiratory distress        Breath sounds: Normal breath sounds  No stridor  No wheezing or rhonchi  Comments: HFNC in place   Abdominal:      General: Abdomen is flat  Bowel sounds are normal       Palpations: Abdomen is soft  Musculoskeletal: Normal range of motion  General: No swelling, tenderness, deformity or signs of injury  Right lower leg: No edema  Left lower leg: No edema  Skin:     General: Skin is warm and dry  Comments: Mild brawny skin changes b/l LE    Neurological:      General: No focal deficit present  Mental Status: He is alert and oriented to person, place, and time  Cranial Nerves: No cranial nerve deficit  Sensory: No sensory deficit  Motor: No weakness     Psychiatric:         Mood and Affect: Mood normal          Behavior: Behavior normal          Laboratory and Diagnostics:  Results from last 7 days   Lab Units 02/09/21  0540 02/08/21  0516 02/07/21  0528 02/06/21  0543 02/05/21  1119   WBC Thousand/uL 10 15 11 56* 6 96 6 04 6 23   HEMOGLOBIN g/dL 14 2 15 8 15 7 15 8 17 2*   HEMATOCRIT % 43 5 48 6 49 0 46 9 52 4*   PLATELETS Thousands/uL 313 315 267 211 219   NEUTROS PCT %  --   --  88* 87* 84*   BANDS PCT %  --  2  --   --   --    MONOS PCT %  --   --  5 6 7   MONO PCT %  --  7  --   --   --      Results from last 7 days   Lab Units 02/09/21  0540 02/08/21  0516 02/07/21  0528 02/06/21  0543 02/05/21  1243   SODIUM mmol/L 140 140 138 138 136   POTASSIUM mmol/L 4 5 4 3 4 4 4 2 4 2   CHLORIDE mmol/L 110* 108 105 105 101   CO2 mmol/L 22 24 24 20* 27   ANION GAP mmol/L 8 8 9 13 8   BUN mg/dL 33* 36* 33* 27* 25   CREATININE mg/dL 0 97 1 19 1 13 1 04 1 30   CALCIUM mg/dL 8 6 8 4 8 5 8 3 8 3   GLUCOSE RANDOM mg/dL 113 119 128 121 109   ALT U/L  --   --  41 44 44   AST U/L  --   --  62* 76* 86*   ALK PHOS U/L  --   --  119* 123* 133*   ALBUMIN g/dL  --   --  2 5* 2 5* 2 9*   TOTAL BILIRUBIN mg/dL  --   --  1 28* 1 83* 2 48*     Results from last 7 days   Lab Units 02/09/21  0540 02/08/21  2124 02/07/21  0528 02/05/21  1243   MAGNESIUM mg/dL 2 5 2 3 2 2 2 3   PHOSPHORUS mg/dL  --  2 8 2 5  --       Results from last 7 days   Lab Units 02/09/21  0540 02/07/21  0528 02/06/21  1121 02/05/21  1119   INR  4 52* 4 14* 3 70* 2 57*   PTT seconds  --   --   --  46*      Results from last 7 days   Lab Units 02/05/21  2231 02/05/21  1921 02/05/21  1619 02/05/21  1119   TROPONIN I ng/mL 0 08* 0 08* 0 07* 0 09*     Results from last 7 days   Lab Units 02/05/21  1350   LACTIC ACID mmol/L 1 2     ABG:    VBG:    Results from last 7 days   Lab Units 02/07/21  0528 02/06/21  0543   PROCALCITONIN ng/ml 0 19 0 21       Micro        EKG: n/a  Imaging: I have personally reviewed pertinent reports  and I have personally reviewed pertinent films in PACS    Intake and Output  I/O       02/08 0701 - 02/09 0700 02/09 0701 - 02/10 0700    P  O  220     Blood 433 8     IV Piggyback 100     Total Intake(mL/kg) 753 8 (7 5)     Urine (mL/kg/hr) 1480 (0 6) 1300 (0 5)    Stool  0    Total Output 1480 1300    Net -726 3 -1300          Unmeasured Stool Occurrence  1 x          Height and Weights      IBW: -88 kg  Body mass index is 29 32 kg/m²  Weight (last 2 days)     Date/Time   Weight    02/10/21 0600   101 (222 22)    02/08/21 0700   101 (222 66)    02/08/21 0600   101 (222 66)                Nutrition       Diet Orders   (From admission, onward)             Start     Ordered    02/05/21 2218  Room Service  Once     Question:  Type of Service  Answer:  Room Service - Appropriate with Assistance    02/05/21 2218 02/05/21 1722  Diet Cardiovascular; Cardiac  Diet effective now     Question Answer Comment   Diet Type Cardiovascular    Cardiac Cardiac    RD to adjust diet per protocol?  Yes        02/05/21 1721                  Active Medications  Scheduled Meds:  Current Facility-Administered Medications   Medication Dose Route Frequency Provider Last Rate    ascorbic acid  1,000 mg Oral Q12H [address] Aura Mejia PA-C      atorvastatin  40 mg Oral HS Aura Mejia PA-C      benzonatate  100 mg Oral TID PRN Suellen Carpenter PA-C      cholecalciferol  2,000 Units Oral Daily Aura Mejia PA-C      dexamethasone  0 1 mg/kg Intravenous Q12H SKINNY Cedeno 10 9 mg (02/10/21 0106)    famotidine  20 mg Oral BID Aura Mejia PA-C      melatonin  6 mg Oral HS SKINNY Dwyer      zinc sulfate  220 mg Oral Daily Aura Mejia PA-C      Followed by   Gloria Baca ON 2/13/2021] multivitamin-minerals  1 tablet Oral Daily Aura Mejia PA-C      ondansetron  4 mg Intravenous Q6H PRN Aura Mejia PA-C      senna-docusate sodium  1 tablet Oral BID Arlander Dancer, CRNP      tamsulosin  0 4 mg Oral Daily Aura Mejia PA-C       Continuous Infusions:     PRN Meds:   benzonatate, 100 mg, TID PRN  ondansetron, 4 mg, Q6H PRN        Invasive Devices Review  Invasive Devices     Peripheral Intravenous Line            Peripheral IV 02/08/21 Right Antecubital 1 day                Rationale for remaining devices:  ---------------------------------------------------------------------------------------  Advance Directive and Living Will:      Power of :    POLST:    ---------------------------------------------------------------------------------------  Care Time Delivered:   n/a      Rice PA-C      Portions of the record may have been created with voice recognition software  Occasional wrong word or "sound a like" substitutions may have occurred due to the inherent limitations of voice recognition software    Read the chart carefully and recognize, using context, where substitutions have occurred

## 2021-02-11 NOTE — PHYSICAL THERAPY NOTE
PHYSICAL THERAPY EVALUATION NOTE    Patient Name: Raul Aguirre  [id number] Date: 2/11/2021  AGE:   70 y o  Mrn:   [id number]  ADMIT DX:  Weakness [R53 1]  Dyspnea [R06 00]  Elevated troponin [R77 8]  Pneumonia due to COVID-19 virus [U07 1, J12 82]  COVID-19 [U07 1]    Past Medical History:   Diagnosis Date    Irregular heart beat      Length Of Stay: 6  PHYSICAL THERAPY EVALUATION :    02/11/21 1201   PT Last Visit   PT Visit Date 02/11/21   Note Type   Note type Evaluation   Pain Assessment   Pain Assessment Tool Pain Assessment not indicated - pt denies pain   Home Living   Type of [address] Two level;1/2 bath on main level;Bed/bath upstairs; Other (Comment)  (3 MADDIE)   Additional Comments lives w/ wife and family  ambulates w/o device  owns walker  independent w/ ADLs and driving  provides occasional assist to wife  + fall history  Prior Function   Comments pt seen sitting out of bed  agreed to participate in PT eval  denied pain or dizziness  pt perseverated on losing his home  input was necessary for task focus  Restrictions/Precautions   Other Precautions Contact/isolation; Airborne/isolation; Chair Alarm; Bed Alarm;Telemetry;O2;Fall Risk;Hard of hearing  (high flow nasal cannula  occasional use of nonrebreather mas)   General   Additional Pertinent History high flow nasal cannula  resting pulse ox 94% and 83 BPM, active 87% and 102 BPM    Family/Caregiver Present No   Cognition   Arousal/Participation Cooperative   Attention Attends with cues to redirect   Orientation Level Oriented to person;Oriented to place; Disoriented to time;Disoriented to situation; Other (Comment)  (pt was identified w/ full name, birth date)   Following Commands Follows one step commands with increased time or repetition   RUE Assessment   RUE Assessment WFL   LUE Assessment   LUE Assessment WFL   RLE Assessment   RLE Assessment WFL  (3+ to 4-/5)   LLE Assessment   LLE Assessment WFL  (3+ to 4-/5)   Coordination   Sensation X  (impaired hearing)   Light Touch   RLE Light Touch Grossly intact   LLE Light Touch Grossly intact   Transfers   Sit to Stand 3  Moderate assistance   Additional items Assist x 1; Increased time required;Verbal cues  (for LE positioning)   Stand to Sit 4  Minimal assistance   Additional items Assist x 1; Impulsive;Verbal cues  (for body positining, controlled descent)   Additional Comments 30 second chair stand test: 0 (as pt is unable to stand w/o use of UEs)  Ambulation/Elevation   Gait pattern Narrow HEMA;Shuffling; Foward flexed; Excessively slow   Gait Assistance 3  Moderate assist  (w/ chair follow)   Additional items Assist x 1;Verbal cues; Tactile cues  (for posture, full step length, breathing technique)   Assistive Device None   Distance 4 feet  (additional not possible due to fatigue)   Stair Management Assistance Not tested  (due to limited ambulation tolerance)   Balance   Static Sitting Good   Dynamic Sitting Fair +   Static Standing Poor +   Ambulatory Poor   Activity Tolerance   Activity Tolerance Patient limited by fatigue   Nurse Made Aware spoke to [address]   Assessment   Prognosis Fair   Problem List Decreased strength;Decreased endurance; Impaired balance;Decreased mobility; Decreased safety awareness; Impaired hearing   Assessment Pt presents to the ED for evaluation of shortness of breath and weakness for the last few days  Dx: LPBPS-25 pneumonia, acute hypoxic respiratory failure, chronic a-fib, and delirium  order placed for PT eval and tx, w/ activity order of up w/ A  pt presents w/ comorbidities of a-fib and hyperlipidemia and personal factors of advanced age, living in 2 story house, stair(s) to enter home, positive fall history and hearing impairments   pt presents w/ weakness, decreased endurance, impaired cognition, impaired balance, gait deviations, impaired hearing, decreased safety awareness and fall risk  these impairments are evident in findings from physical examination (weakness), mobility assessment (need for min to mod assist w/ all phases of mobility when usually mobilizing independently, tolerance to only 4 feet of ambulation and need for cueing for mobility and breathing technique), and Barthel Index: 55/100 and 30 second chair stand test: 0 (less than 12 indicates fall risk in males 79to 76years old)  pt needed input for task focus and mobility technique  pt is at risk for falls due to physical and safety awareness deficits  pt's clinical presentation is unstable/unpredictable (evident in need for assist w/ all phases of mobility when usually mobilizing independently, tolerance to only 4 feet of ambulation, declining oxygen saturation w/ low level of activity, need for supplemental oxygen in order to maintain oxygen saturation and need for input for task focus and mobility technique)  pt needs inpatient PT tx to improve mobility deficits and progress mobility training as appropriate  discharge recommendation is for inpatient rehab to reduce fall risk and maximize level of functional independence  Goals   Patient Goals go home today  STG Expiration Date 02/21/21   Short Term Goal #1 pt will:  Increase bilateral LE strength 1/2 grade to facilitate independent mobility, Perform all bed mobility tasks w/ supervision to decrease fall risk factors, Perform all transfers w/ supervision to improve independence, Ambulate 120 ft  with least restrictive assistive device w/ supervision w/o LOB to facilitate safe return home, Navigate 2 stairs w/ modx1 with unilateral handrail to facilitate return to previous living environment, Increase all balance 1 grade to decrease risk for falls, Complete exercise program independently, Tolerate 3 hr OOB to faciliate upright tolerance, Improve Barthel Index score to 80 or greater to facilitate independence and Increase 30 second chair stand test score to 4 or greater to decrease risk for falls   Plan   Treatment/Interventions Functional transfer training;LE strengthening/ROM; Elevations; Therapeutic exercise; Endurance training;Patient/family training;Equipment eval/education; Bed mobility;Gait training   PT Frequency 5x/wk   Recommendation   PT Discharge Recommendation Post-Acute Rehabilitation Services   -[facility] Basic Mobility Inpatient   Turning in Bed Without Bedrails 4   Lying on Back to Sitting on Edge of Flat Bed 3   Moving Bed to Chair 3   Standing Up From Chair 3   Walk in Room 2   Climb 3-5 Stairs 1   Basic Mobility Inpatient Raw Score 16   Basic Mobility Standardized Score 38 32   Barthel Index   Feeding 10   Bathing 0   Grooming Score 5   Dressing Score 5   Bladder Score 10   Bowels Score 10   Toilet Use Score 5   Transfers (Bed/Chair) Score 10   Mobility (Level Surface) Score 0   Stairs Score 0   Barthel Index Score 55     30 second chair stand test: 0 (less than 12 indicates fall risk in males 79to 76years old)  The patient's -[facility] Basic Mobility Inpatient Short Form Raw Score is 16, Standardized Score is 38 32  A standardized score less than 42 9 suggests the patient may benefit from discharge to post-acute rehabilitation services  Please also refer to the recommendation of the Physical Therapist for safe discharge planning  Skilled PT recommended while in hospital and upon DC to progress pt toward treatment goals       Portia Pete, PT

## 2021-02-11 NOTE — ASSESSMENT & PLAN NOTE
· Currently rate controlled   · Echo pending   · Coumadin on hold for supratherapeutic INR  · S/p Vitamin K 5mg on 2/9  · INR continues to downtrend

## 2021-02-11 NOTE — QUICK NOTE
Called patient's son Maye Lion to provide a family update  Timur Lion appears to be quite ill with Covid as well  All questions answered to patient's family's satisfaction

## 2021-02-11 NOTE — ASSESSMENT & PLAN NOTE
· Continue severe pathway treatment protocol  ? On HFNC 100%/40L +/- NRB   ? Antibiotics discontinued due to negative procal x 2  ? Completed course of Remdesivir   ? Continue steroids, Day 6/10  ? Trend inflammatory markers: Ferritin, CRP, D-dimer QOD for possible Actemra dosing   ? AC: supratherapeutic INR  ? Encourage SP, and incentive spirometry  ?  Wean O2 to maintain saturations > 88-90%

## 2021-02-11 NOTE — PLAN OF CARE
Problem: PAIN - ADULT  Goal: Verbalizes/displays adequate comfort level or baseline comfort level  Description: Interventions:  - Encourage patient to monitor pain and request assistance  - Assess pain using appropriate pain scale  - Administer analgesics based on type and severity of pain and evaluate response  - Implement non-pharmacological measures as appropriate and evaluate response  - Consider cultural and social influences on pain and pain management  - Notify physician/advanced practitioner if interventions unsuccessful or patient reports new pain  Outcome: Progressing     Problem: INFECTION - ADULT  Goal: Absence or prevention of progression during hospitalization  Description: INTERVENTIONS:  - Assess and monitor for signs and symptoms of infection  - Monitor lab/diagnostic results  - Monitor all insertion sites, i e  indwelling lines, tubes, and drains  - Monitor endotracheal if appropriate and nasal secretions for changes in amount and color  - [place] appropriate cooling/warming therapies per order  - Administer medications as ordered  - Instruct and encourage patient and family to use good hand hygiene technique  - Identify and instruct in appropriate isolation precautions for identified infection/condition  Outcome: Progressing     Problem: SAFETY ADULT  Goal: Patient will remain free of falls  Description: INTERVENTIONS:  - Assess patient frequently for physical needs  -  Identify cognitive and physical deficits and behaviors that affect risk of falls    -  [place] fall precautions as indicated by assessment   - Educate patient/family on patient safety including physical limitations  - Instruct patient to call for assistance with activity based on assessment  - Modify environment to reduce risk of injury  - Consider OT/PT consult to assist with strengthening/mobility  Outcome: Progressing  Goal: Maintain or return to baseline ADL function  Description: INTERVENTIONS:  -  Assess patient's ability to carry out ADLs; assess patient's baseline for ADL function and identify physical deficits which impact ability to perform ADLs (bathing, care of mouth/teeth, toileting, grooming, dressing, etc )  - Assess/evaluate cause of self-care deficits   - Assess range of motion  - Assess patient's mobility; develop plan if impaired  - Assess patient's need for assistive devices and provide as appropriate  - Encourage maximum independence but intervene and supervise when necessary  - Involve family in performance of ADLs  - Assess for home care needs following discharge   - Consider OT consult to assist with ADL evaluation and planning for discharge  - Provide patient education as appropriate  Outcome: Progressing  Goal: Maintain or return mobility status to optimal level  Description: INTERVENTIONS:  - Assess patient's baseline mobility status (ambulation, transfers, stairs, etc )    - Identify cognitive and physical deficits and behaviors that affect mobility  - Identify mobility aids required to assist with transfers and/or ambulation (gait belt, sit-to-stand, lift, walker, cane, etc )  - [place] fall precautions as indicated by assessment  - Record patient progress and toleration of activity level on Mobility SBAR; progress patient to next Phase/Stage  - Instruct patient to call for assistance with activity based on assessment  - Consider rehabilitation consult to assist with strengthening/weightbearing, etc   Outcome: Progressing     Problem: DISCHARGE PLANNING  Goal: Discharge to home or other facility with appropriate resources  Description: INTERVENTIONS:  - Identify barriers to discharge w/patient and caregiver  - Arrange for needed discharge resources and transportation as appropriate  - Identify discharge learning needs (meds, wound care, etc )  - Arrange for interpretive services to assist at discharge as needed  - Refer to Case Management Department for coordinating discharge planning if the patient needs post-hospital services based on physician/advanced practitioner order or complex needs related to functional status, cognitive ability, or social support system  Outcome: Progressing     Problem: Knowledge Deficit  Goal: Patient/family/caregiver demonstrates understanding of disease process, treatment plan, medications, and discharge instructions  Description: Complete learning assessment and assess knowledge base  Interventions:  - Provide teaching at level of understanding  - Provide teaching via preferred learning methods  Outcome: Progressing     Problem: Potential for Falls  Goal: Patient will remain free of falls  Description: INTERVENTIONS:  - Assess patient frequently for physical needs  -  Identify cognitive and physical deficits and behaviors that affect risk of falls    -  [place] fall precautions as indicated by assessment   - Educate patient/family on patient safety including physical limitations  - Instruct patient to call for assistance with activity based on assessment  - Modify environment to reduce risk of injury  - Consider OT/PT consult to assist with strengthening/mobility  Outcome: Progressing     Problem: Prexisting or High Potential for Compromised Skin Integrity  Goal: Skin integrity is maintained or improved  Description: INTERVENTIONS:  - Identify patients at risk for skin breakdown  - Assess and monitor skin integrity  - Assess and monitor nutrition and hydration status  - Monitor labs   - Assess for incontinence   - Turn and reposition patient  - Assist with mobility/ambulation  - Relieve pressure over bony prominences  - Avoid friction and shearing  - Provide appropriate hygiene as needed including keeping skin clean and dry  - Evaluate need for skin moisturizer/barrier cream  - Collaborate with interdisciplinary team   - Patient/family teaching  - Consider wound care consult   Outcome: Progressing     Problem: Nutrition/Hydration-ADULT  Goal: Nutrient/Hydration intake appropriate for improving, restoring or maintaining nutritional needs  Description: Monitor and assess patient's nutrition/hydration status for malnutrition  Collaborate with interdisciplinary team and initiate plan and interventions as ordered  Monitor patient's weight and dietary intake as ordered or per policy  Utilize nutrition screening tool and intervene as necessary  Determine patient's food preferences and provide high-protein, high-caloric foods as appropriate       INTERVENTIONS:  - Monitor oral intake, urinary output, labs, and treatment plans  - Assess nutrition and hydration status and recommend course of action  - Evaluate amount of meals eaten  - Assist patient with eating if necessary   - Allow adequate time for meals  - Recommend/ encourage appropriate diets, oral nutritional supplements, and vitamin/mineral supplements  - Order, calculate, and assess calorie counts as needed  - Recommend, monitor, and adjust tube feedings and TPN/PPN based on assessed needs  - Assess need for intravenous fluids  - Provide specific nutrition/hydration education as appropriate  - Include patient/family/caregiver in decisions related to nutrition  Outcome: Progressing     Problem: RESPIRATORY - ADULT  Goal: Achieves optimal ventilation and oxygenation  Description: INTERVENTIONS:  - Assess for changes in respiratory status  - Assess for changes in mentation and behavior  - Position to facilitate oxygenation and minimize respiratory effort  - Oxygen administered by appropriate delivery if ordered  - Initiate smoking cessation education as indicated  - Encourage broncho-pulmonary hygiene including cough, deep breathe, Incentive Spirometry  - Assess the need for suctioning and aspirate as needed  - Assess and instruct to report SOB or any respiratory difficulty  - Respiratory Therapy support as indicated  Outcome: Progressing     Problem: METABOLIC, FLUID AND ELECTROLYTES - ADULT  Goal: Electrolytes maintained within normal limits  Description: INTERVENTIONS:  - Monitor labs and assess patient for signs and symptoms of electrolyte imbalances  - Administer electrolyte replacement as ordered  - Monitor response to electrolyte replacements, including repeat lab results as appropriate  - Instruct patient on fluid and nutrition as appropriate  Outcome: Progressing  Goal: Fluid balance maintained  Description: INTERVENTIONS:  - Monitor labs   - Monitor I/O and WT  - Instruct patient on fluid and nutrition as appropriate  - Assess for signs & symptoms of volume excess or deficit  Outcome: Progressing     Problem: SAFETY,RESTRAINT: NV/NON-SELF DESTRUCTIVE BEHAVIOR  Goal: Remains free of harm/injury (restraint for non violent/non self-detsructive behavior)  Description: INTERVENTIONS:  - Instruct patient/family regarding restraint use   - Assess and monitor physiologic and psychological status   - Provide interventions and comfort measures to meet assessed patient needs   - Identify and implement measures to help patient regain control  - Assess readiness for release of restraint   Outcome: Progressing  Goal: Returns to optimal restraint-free functioning  Description: INTERVENTIONS:  - Assess the patient's behavior and symptoms that indicate continued need for restraint  - Identify and implement measures to help patient regain control  - Assess readiness for release of restraint   Outcome: Progressing

## 2021-02-11 NOTE — PHYSICAL THERAPY NOTE
PHYSICAL THERAPY TREATMENT NOTE    Patient Name: Yenni Leahy  XCRMF'I Date: 2/11/2021 02/11/21 1211   PT Last Visit   PT Visit Date 02/11/21   Note Type   Note Type Treatment   Pain Assessment   Pain Assessment Tool Pain Assessment not indicated - pt denies pain   Restrictions/Precautions   Other Precautions Contact/isolation; Airborne/isolation; Chair Alarm; Bed Alarm;Telemetry;O2;Fall Risk;Hard of hearing  (high flow nasal cannula  occasional use of nonrebreather mas)   General   Chart Reviewed Yes   Family/Caregiver Present No   Cognition   Arousal/Participation Alert; Cooperative   Attention Attends with cues to redirect   Orientation Level Oriented to person;Oriented to place; Disoriented to time;Disoriented to situation; Other (Comment)  (pt was identified w/ full name, birth date)   Following Commands Follows one step commands with increased time or repetition   Subjective   Subjective pt agreed to participate in PT intervention  Transfers   Sit to Stand 4  Minimal assistance   Additional items Assist x 1; Increased time required;Verbal cues  (for hand placement)   Stand to Sit 4  Minimal assistance   Additional items Assist x 1; Impulsive;Verbal cues  (for body positioning, hand placement)   Ambulation/Elevation   Gait pattern Shuffling; Foward flexed; Excessively slow   Gait Assistance 4  Minimal assist  (w/ chair follow)   Additional items Assist x 1;Verbal cues; Tactile cues  (for walker positioning, breathing technique)   Assistive Device Rolling walker   Distance 8 feet  seated rest break x 3 minutes   10 feet  (additional not possible due to fatigue)   Balance   Static Sitting Good   Dynamic Sitting Fair +   Static Standing Poor +   Ambulatory Poor +  (w/ roller walker)   Activity Tolerance   Activity Tolerance Patient limited by fatigue   Nurse Made Aware spoke to [address]   Assessment   Prognosis Fair   Problem List Decreased strength;Decreased endurance; Impaired balance;Decreased mobility; Decreased safety awareness; Impaired hearing   Assessment Therapist introduced roller walker use w/ mobility to address impairments noted during evaluation  Pt was noted to have improvement in mobility status w/ use of walker w/ increased ambulation tolerance and decreased level of assistance  Pt continues to min assist and verbal cues w/ mobility for proper technique/safety  Pt is at risk for falling  continued inpatient PT tx is indicated to reduce fall risk factors  Goals   Patient Goals go home today   STG Expiration Date 02/21/21   Short Term Goal #1 pt will: Increase bilateral LE strength 1/2 grade to facilitate independent mobility, Perform all bed mobility tasks w/ supervision to decrease fall risk factors, Perform all transfers w/ supervision to improve independence, Ambulate 120 ft  with least restrictive assistive device w/ supervision w/o LOB to facilitate safe return home, Navigate 2 stairs w/ modx1 with unilateral handrail to facilitate return to previous living environment, Increase all balance 1 grade to decrease risk for falls, Complete exercise program independently, Tolerate 3 hr OOB to faciliate upright tolerance, Improve Barthel Index score to 80 or greater to facilitate independence and Increase 30 second chair stand test score to 4 or greater to decrease risk for falls   PT Treatment Day 1   Plan   Treatment/Interventions Functional transfer training;LE strengthening/ROM; Elevations; Therapeutic exercise; Endurance training;Patient/family training;Equipment eval/education; Bed mobility;Gait training   Progress Progressing toward goals   PT Frequency 5x/wk   Recommendation   PT Discharge Recommendation Post-Acute Rehabilitation Services   Equipment Recommended Walker   AM-PAC Basic Mobility Inpatient   Turning in Bed Without Bedrails 4   Lying on Back to Sitting on Edge of Flat Bed 3   Moving Bed to Chair 3   Standing Up From Chair 3 Walk in Room 2   Climb 3-5 Stairs 1   Basic Mobility Inpatient Raw Score 16   Basic Mobility Standardized Score 38 32     Skilled inpatient PT recommended while in hospital to progress pt toward treatment goals      Regina Aguilar, PT

## 2021-02-11 NOTE — PROGRESS NOTES
Progress Note - Sonja López 1945, 70 y o  male MRN: [id number]    Unit/Bed#: S -01 Encounter: [id number]    Primary Care Provider: No primary care provider on file  Date and time admitted to hospital: 2/5/2021 11:04 AM        * Pneumonia due to COVID-19 virus  Assessment & Plan  · Continue severe pathway treatment protocol  ? On HFNC 100%/40L +/- NRB   ? Antibiotics discontinued due to negative procal x 2  ? Completed course of Remdesivir   ? Continue steroids, Day 6/10  ? Trend inflammatory markers: Ferritin, CRP, D-dimer QOD for possible Actemra dosing   ? AC: supratherapeutic INR  ? Encourage SP, and incentive spirometry  ? Wean O2 to maintain saturations > 88-90%      Acute respiratory failure with hypoxia (HCC)  Assessment & Plan  · Secondary to COVID PNA  · Continue treatment per protocol  · Continue HFNC +/- NRB to maintain SpO2 >88%     Chronic atrial fibrillation (HCC)  Assessment & Plan  · Currently rate controlled   · Coumadin on hold for supratherapeutic INR  · S/p Vitamin K 5mg on 2/9  · INR continues to downtrend       Delirium due to general medical condition  Assessment & Plan  · Patient became bradycardic with Precedex, discontinued  · Caution with antipsychotic medications with most recent QTC of 508  · Improved with sleep hygiene and melatonin  · Frequently reorient and reassure      ----------------------------------------------------------------------------------------  HPI/24hr events: No acute events overnight  Disposition: Continue Stepdown Level 1 level of care   Code Status: Level 1 - Full Code  ---------------------------------------------------------------------------------------  SUBJECTIVE  Patient more agitated on exam today, disoriented to place and cursing  Pt tolerating HFNC and NRB well, desaturating with movement/ eating  Pt with increased b/l LE edema, echo pending       Review of Systems  A 12 point ROS was completed and is negative with exception of irritability   ---------------------------------------------------------------------------------------  OBJECTIVE    Vitals   Vitals:    21 0130 21 0300 21 0355 21 0557   BP: 118/73 93/63     BP Location:  Right arm     Pulse: 78 84     Resp:  18     Temp:  98 °F (36 7 °C)     TempSrc:  Axillary     SpO2: (!) 89% 93% 96%    Weight:    98 3 kg (216 lb 11 4 oz)     Temp (24hrs), Av 8 °F (36 6 °C), Min:97 3 °F (36 3 °C), Max:98 1 °F (36 7 °C)  Current: Temperature: 98 °F (36 7 °C)          Respiratory:  SpO2: SpO2: 96 %, SpO2 Activity: SpO2 Activity: At Rest, SpO2 Device: O2 Device: High flow nasal cannula  Nasal Cannula O2 Flow Rate (L/min): 40 L/min    Invasive/non-invasive ventilation settings   Respiratory    Lab Data (Last 4 hours)    None         O2/Vent Data (Last 4 hours)      355          Non-Invasive Ventilation Mode HFNC (High flow)                   Physical Exam  Vitals signs and nursing note reviewed  Constitutional:       General: He is not in acute distress  Appearance: He is ill-appearing  He is not toxic-appearing  HENT:      Head: Normocephalic and atraumatic  Nose: Nose normal       Mouth/Throat:      Mouth: Mucous membranes are moist    Eyes:      General:         Right eye: No discharge  Left eye: No discharge  Conjunctiva/sclera: Conjunctivae normal    Cardiovascular:      Rate and Rhythm: Rhythm irregular  Pulmonary:      Effort: No respiratory distress  Breath sounds: No stridor  No wheezing or rhonchi  Comments: tachypneic   Abdominal:      General: Abdomen is flat  Bowel sounds are normal  There is no distension  Palpations: Abdomen is soft  There is no mass  Tenderness: There is no abdominal tenderness  Musculoskeletal: Normal range of motion  General: No swelling, tenderness, deformity or signs of injury  Right lower leg: Edema present  Left lower leg: Edema present     Skin:     General: Skin is warm and dry  Neurological:      Cranial Nerves: No cranial nerve deficit  Sensory: No sensory deficit  Motor: No weakness        Comments: Pt oriented to self and year, disoriented to place    Psychiatric:      Comments: Irritable          Laboratory and Diagnostics:  Results from last 7 days   Lab Units 02/11/21  0401 02/10/21  0653 02/09/21  0540 02/08/21  0516 02/07/21  0528 02/06/21  0543 02/05/21  1119   WBC Thousand/uL 10 77* 9 67 10 15 11 56* 6 96 6 04 6 23   HEMOGLOBIN g/dL 14 4 14 4 14 2 15 8 15 7 15 8 17 2*   HEMATOCRIT % 45 2 44 8 43 5 48 6 49 0 46 9 52 4*   PLATELETS Thousands/uL 300 314 313 315 267 211 219   NEUTROS PCT % 94*  --   --   --  88* 87* 84*   BANDS PCT %  --  4  --  2  --   --   --    MONOS PCT % 3*  --   --   --  5 6 7   MONO PCT %  --  1*  --  7  --   --   --      Results from last 7 days   Lab Units 02/11/21  0401 02/10/21  0702 02/09/21  0540 02/08/21  0516 02/07/21  0528 02/06/21  0543 02/05/21  1243   SODIUM mmol/L 140 141 140 140 138 138 136   POTASSIUM mmol/L 5 0 5 1 4 5 4 3 4 4 4 2 4 2   CHLORIDE mmol/L 107 109* 110* 108 105 105 101   CO2 mmol/L 26 24 22 24 24 20* 27   ANION GAP mmol/L 7 8 8 8 9 13 8   BUN mg/dL 36* 29* 33* 36* 33* 27* 25   CREATININE mg/dL 0 98 0 84 0 97 1 19 1 13 1 04 1 30   CALCIUM mg/dL 8 2* 8 4 8 6 8 4 8 5 8 3 8 3   GLUCOSE RANDOM mg/dL 138 116 113 119 128 121 109   ALT U/L  --   --   --   --  41 44 44   AST U/L  --   --   --   --  62* 76* 86*   ALK PHOS U/L  --   --   --   --  119* 123* 133*   ALBUMIN g/dL  --   --   --   --  2 5* 2 5* 2 9*   TOTAL BILIRUBIN mg/dL  --   --   --   --  1 28* 1 83* 2 48*     Results from last 7 days   Lab Units 02/10/21  0702 02/09/21  0540 02/08/21  0516 02/07/21  0528 02/05/21  1243   MAGNESIUM mg/dL 2 4 2 5 2 3 2 2 2 3   PHOSPHORUS mg/dL  --   --  2 8 2 5  --       Results from last 7 days   Lab Units 02/11/21  0401 02/10/21  0703 02/09/21  0540 02/07/21  0528 02/06/21  1121 02/05/21  1119   INR  2 86* 3 90* 4 52* 4 14* 3 70* 2 57*   PTT seconds  --   --   --   --   --  46*      Results from last 7 days   Lab Units 02/05/21  2231 02/05/21  1921 02/05/21  1619 02/05/21  1119   TROPONIN I ng/mL 0 08* 0 08* 0 07* 0 09*     Results from last 7 days   Lab Units 02/05/21  1350   LACTIC ACID mmol/L 1 2     ABG:    VBG:    Results from last 7 days   Lab Units 02/07/21  0528 02/06/21  0543   PROCALCITONIN ng/ml 0 19 0 21       Micro        EKG: n/a  Imaging: n/a    Intake and Output  I/O       02/09 0701 - 02/10 0700 02/10 0701 - 02/11 0700    P  O   600    Total Intake(mL/kg)  600 (6 1)    Urine (mL/kg/hr) 1300 (0 5) 1425 (0 6)    Stool 0     Total Output 1300 1425    Net -1300 -825          Unmeasured Stool Occurrence 1 x           Height and Weights      IBW: -88 kg  Body mass index is 28 59 kg/m²  Weight (last 2 days)     Date/Time   Weight    02/11/21 0557   98 3 (216 71)    02/10/21 0600   101 (222 22)                Nutrition       Diet Orders   (From admission, onward)             Start     Ordered    02/05/21 2218  Room Service  Once     Question:  Type of Service  Answer:  Room Service - Appropriate with Assistance    02/05/21 2218    02/05/21 1722  Diet Cardiovascular; Cardiac  Diet effective now     Question Answer Comment   Diet Type Cardiovascular    Cardiac Cardiac    RD to adjust diet per protocol?  Yes        02/05/21 1721                  Active Medications  Scheduled Meds:  Current Facility-Administered Medications   Medication Dose Route Frequency Provider Last Rate    ascorbic acid  1,000 mg Oral Q12H [address] Aura Mejia PA-C      atorvastatin  40 mg Oral HS Aura Mejia PA-C      benzonatate  100 mg Oral TID PRN Maddi Vilchis PA-C      cholecalciferol  2,000 Units Oral Daily Aura Mejia PA-C      dexamethasone  0 1 mg/kg Intravenous Q12H SKINNY Cooney 10 9 mg (02/11/21 0050)    famotidine  20 mg Oral BID Aura Mejia PA-C      melatonin  6 mg Oral HS SKINNY Diaz      zinc sulfate  220 mg Oral Daily Aura Mejia PA-C      Followed by   Azeem Schmitt ON 2/13/2021] multivitamin-minerals  1 tablet Oral Daily Aura Mejia PA-C      ondansetron  4 mg Intravenous Q6H PRN Aura Mejia PA-C      senna-docusate sodium  1 tablet Oral BID SKINNY Watson      tamsulosin  0 4 mg Oral Daily Aura Mejia PA-C       Continuous Infusions:     PRN Meds:   benzonatate, 100 mg, TID PRN  ondansetron, 4 mg, Q6H PRN        Invasive Devices Review  Invasive Devices     Peripheral Intravenous Line            Peripheral IV 02/08/21 Right Antecubital 2 days                Rationale for remaining devices: HFNC for respiratory support   ---------------------------------------------------------------------------------------  Advance Directive and Living Will:      Power of :    POLST:    ---------------------------------------------------------------------------------------  Care Time Delivered:         [place] PA-C      Portions of the record may have been created with voice recognition software  Occasional wrong word or "sound a like" substitutions may have occurred due to the inherent limitations of voice recognition software    Read the chart carefully and recognize, using context, where substitutions have occurred

## 2021-02-12 NOTE — QUICK NOTE
Stroke the patient's son Eloy Kc and provided a family update  All questions answered the patient's family's satisfaction

## 2021-02-12 NOTE — PROGRESS NOTES
Progress Note - Naren Finley 1945, 70 y o  male MRN: [id number]    Unit/Bed#: S -01 Encounter: [id number]    Primary Care Provider: No primary care provider on file  Date and time admitted to hospital: 2/5/2021 11:04 AM        * Pneumonia due to COVID-19 virus  Assessment & Plan  · Continue severe pathway treatment protocol  ? On HFNC 100%/40L +/- NRB   ? Antibiotics discontinued due to negative procal x 2  ? Completed course of Remdesivir   ? Continue steroids, Day 6/10  ? Trend inflammatory markers: Ferritin, CRP, D-dimer QOD for possible Actemra dosing   ? AC: supratherapeutic INR  ? Encourage SP, and incentive spirometry  ? Wean O2 to maintain saturations > 88-90%      Acute respiratory failure with hypoxia (HCC)  Assessment & Plan  · Secondary to COVID PNA  · Continue treatment per protocol  · Continue HFNC +/- NRB to maintain SpO2 >88%     Chronic atrial fibrillation (HCC)  Assessment & Plan  · Currently rate controlled   · Echo demonstrates:  · EF of 60%,  · Mild right and left atrial dilation, mild right ventricle dilation  · Mitral valve:  moderate annular calcification and mild regurgitation  · Aortic valve:  mild stenosis and mild regurgitation  · Ascending aorta:  mild to moderate dilatation(4 7 cm)    · Small, free-flowing pericardial effusion circumferential to the heart without evidence of hemodynamic compromise  · Coumadin on hold on admission for supratherapeutic INR  · S/p Vitamin K 5mg on 2/9  · INR therapeutic on 2/11  · Restarted on 2/11  · Continue to trend INR       Delirium due to general medical condition  Assessment & Plan  · Patient became bradycardic with Precedex, discontinued  · Caution with antipsychotic medications with most recent QTC of 508,   · Pt is tolerating low-dose Zyprexa, daily EKGs  · Improved with sleep hygiene and melatonin  · Frequently reorient and reassure        ----------------------------------------------------------------------------------------  HPI/24hr events:  No acute events overnight      Disposition: Continue Stepdown Level 1 level of care   Code Status: Level 1 - Full Code  ---------------------------------------------------------------------------------------  SUBJECTIVE  Patient is alert and lying in bed in no acute distress  He denies complaints  He is polite, cooperative and interactive on examination  Did not tolerate weaning from high-flow nasal cannula non-rebreather mask  Review of Systems  A 12 point review systems was completed and is negative  ---------------------------------------------------------------------------------------  OBJECTIVE    Vitals   Vitals:    21 0300 21 0419 21 0500 21 0537   BP:   119/88    BP Location:   Right arm    Pulse: 80  86    Resp: 18      Temp:   97 5 °F (36 4 °C)    TempSrc:   Axillary    SpO2: (!) 89% 94% 90%    Weight:   97 3 kg (214 lb 8 1 oz) 97 3 kg (214 lb 8 1 oz)     Temp (24hrs), Av 4 °F (36 3 °C), Min:96 7 °F (35 9 °C), Max:97 7 °F (36 5 °C)  Current: Temperature: 97 5 °F (36 4 °C)          Respiratory:  SpO2: SpO2: (!) 87 %  , SpO2 Activity: SpO2 Activity: At Rest, SpO2 Device: O2 Device: High flow nasal cannula  Nasal Cannula O2 Flow Rate (L/min): 40 L/min    Invasive/non-invasive ventilation settings   Respiratory    Lab Data (Last 4 hours)    None         O2/Vent Data (Last 4 hours)       041          Non-Invasive Ventilation Mode HFNC (High flow)                   Physical Exam  Vitals signs and nursing note reviewed  Constitutional:       General: He is not in acute distress  Appearance: He is ill-appearing  He is not toxic-appearing or diaphoretic  HENT:      Head: Normocephalic and atraumatic  Nose: Nose normal       Mouth/Throat:      Mouth: Mucous membranes are moist    Eyes:      General: No scleral icterus          Right eye: No discharge  Left eye: No discharge  Conjunctiva/sclera: Conjunctivae normal    Cardiovascular:      Rate and Rhythm: Normal rate  Rhythm irregular  Pulmonary:      Effort: No respiratory distress  Breath sounds: No stridor  No wheezing, rhonchi or rales  Comments: Mild tachypnea  High-flow nasal cannula 100% FiO2 and 40 liters/minute  Abdominal:      General: Abdomen is flat  Bowel sounds are normal       Palpations: Abdomen is soft  Musculoskeletal:         General: No tenderness, deformity or signs of injury  Comments: 1+ edema bilateral lower extremities   Skin:     General: Skin is warm and dry  Comments: Mild brawny edema changes bilateral lower extremity   Neurological:      General: No focal deficit present  Mental Status: He is alert  Cranial Nerves: No cranial nerve deficit  Sensory: No sensory deficit  Motor: No weakness        Gait: Gait normal    Psychiatric:         Mood and Affect: Mood normal          Behavior: Behavior normal          Laboratory and Diagnostics:  Results from last 7 days   Lab Units 02/11/21  0401 02/10/21  0653 02/09/21  0540 02/08/21  0516 02/07/21  0528 02/06/21  0543 02/05/21  1119   WBC Thousand/uL 10 77* 9 67 10 15 11 56* 6 96 6 04 6 23   HEMOGLOBIN g/dL 14 4 14 4 14 2 15 8 15 7 15 8 17 2*   HEMATOCRIT % 45 2 44 8 43 5 48 6 49 0 46 9 52 4*   PLATELETS Thousands/uL 300 314 313 315 267 211 219   NEUTROS PCT % 94*  --   --   --  88* 87* 84*   BANDS PCT %  --  4  --  2  --   --   --    MONOS PCT % 3*  --   --   --  5 6 7   MONO PCT %  --  1*  --  7  --   --   --      Results from last 7 days   Lab Units 02/11/21  0401 02/10/21  0702 02/09/21  0540 02/08/21  0516 02/07/21  0528 02/06/21  0543 02/05/21  1243   SODIUM mmol/L 140 141 140 140 138 138 136   POTASSIUM mmol/L 5 0 5 1 4 5 4 3 4 4 4 2 4 2   CHLORIDE mmol/L 107 109* 110* 108 105 105 101   CO2 mmol/L 26 24 22 24 24 20* 27   ANION GAP mmol/L 7 8 8 8 9 13 8   BUN mg/dL 36* 29* 33* 36* 33* 27* 25   CREATININE mg/dL 0 98 0 84 0 97 1 19 1 13 1 04 1 30   CALCIUM mg/dL 8 2* 8 4 8 6 8 4 8 5 8 3 8 3   GLUCOSE RANDOM mg/dL 138 116 113 119 128 121 109   ALT U/L  --   --   --   --  41 44 44   AST U/L  --   --   --   --  62* 76* 86*   ALK PHOS U/L  --   --   --   --  119* 123* 133*   ALBUMIN g/dL  --   --   --   --  2 5* 2 5* 2 9*   TOTAL BILIRUBIN mg/dL  --   --   --   --  1 28* 1 83* 2 48*     Results from last 7 days   Lab Units 02/10/21  0702 02/09/21  0540 02/08/21  0516 02/07/21  0528 02/05/21  1243   MAGNESIUM mg/dL 2 4 2 5 2 3 2 2 2 3   PHOSPHORUS mg/dL  --   --  2 8 2 5  --       Results from last 7 days   Lab Units 02/12/21  0441 02/11/21  0401 02/10/21  0703 02/09/21  0540 02/07/21  0528 02/06/21  1121 02/05/21  1119   INR  2 83* 2 86* 3 90* 4 52* 4 14* 3 70* 2 57*   PTT seconds  --   --   --   --   --   --  46*      Results from last 7 days   Lab Units 02/05/21  2231 02/05/21  1921 02/05/21  1619 02/05/21  1119   TROPONIN I ng/mL 0 08* 0 08* 0 07* 0 09*     Results from last 7 days   Lab Units 02/05/21  1350   LACTIC ACID mmol/L 1 2     ABG:    VBG:    Results from last 7 days   Lab Units 02/07/21  0528 02/06/21  0543   PROCALCITONIN ng/ml 0 19 0 21       Micro        EKG:  Pending  Imaging: n/A     Intake and Output  I/O       02/10 0701 - 02/11 0700 02/11 0701 - 02/12 0700 02/12 0701 - 02/13 0700    P  O  600 1440     IV Piggyback  50     Total Intake(mL/kg) 600 (6 1) 1490 (15 3)     Urine (mL/kg/hr) 1625 (0 7) 1150 (0 5)     Stool       Total Output 1625 1150     Net -1025 +340                  Height and Weights      IBW: -88 kg  Body mass index is 28 3 kg/m²    Weight (last 2 days)     Date/Time   Weight    02/12/21 0537   97 3 (214 51)    02/12/21 0500   97 3 (214 51)    02/11/21 0557   98 3 (216 71)    02/10/21 0600   101 (222 22)                Nutrition       Diet Orders   (From admission, onward)             Start     Ordered    02/11/21 1339  Dietary nutrition supplements Once     Question Answer Comment   Select Supplement: Ensure Enlive-Vanilla    Frequency Breakfast, Dinner        02/11/21 1338    02/05/21 2218  Room Service  Once     Question:  Type of Service  Answer:  Room Service - Appropriate with Assistance    02/05/21 2218 02/05/21 1722  Diet Cardiovascular; Cardiac  Diet effective now     Question Answer Comment   Diet Type Cardiovascular    Cardiac Cardiac    RD to adjust diet per protocol?  Yes        02/05/21 1721                  Active Medications  Scheduled Meds:  Current Facility-Administered Medications   Medication Dose Route Frequency Provider Last Rate    ascorbic acid  1,000 mg Oral Q12H [facility] Aura Mejia PA-C      atorvastatin  40 mg Oral HS Aura Mejia PA-C      benzonatate  100 mg Oral TID PRN [address] Knapp, KATHIE      cholecalciferol  2,000 Units Oral Daily Aura Mejia PA-C      dexamethasone  0 1 mg/kg Intravenous Q12H SKINNY White Stopped (02/12/21 0118)    famotidine  20 mg Oral BID Aura Mejia PA-C      melatonin  10 5 mg Oral HS ChristelleSKINNY Love      zinc sulfate  220 mg Oral Daily Aura Mejia PA-C      Followed by   Leslie Rodrigues ON 2/13/2021] multivitamin-minerals  1 tablet Oral Daily Aura Mejia PA-C      OLANZapine  5 mg Oral HS SKINNY Diaz      ondansetron  4 mg Intravenous Q6H PRN Aura Mejai PA-C      polyethylene glycol  17 g Oral Daily SKINNY Johnson      senna-docusate sodium  1 tablet Oral BID SKINNY Best      tamsulosin  0 4 mg Oral Daily Aura Mejia PA-C      warfarin  2 5 mg Oral HS SKINNY Diaz       Continuous Infusions:     PRN Meds:   benzonatate, 100 mg, TID PRN  ondansetron, 4 mg, Q6H PRN        Invasive Devices Review  Invasive Devices     Peripheral Intravenous Line            Peripheral IV 02/08/21 Right Antecubital 3 days    Peripheral IV 02/12/21 Right;Ventral (anterior) Forearm less than 1 day                Rationale for remaining devices: HFNC and NRB for respiratory support   ---------------------------------------------------------------------------------------  Advance Directive and Living Will:      Power of :    POLST:    ---------------------------------------------------------------------------------------  Care Time Delivered:         [place] PA-C      Portions of the record may have been created with voice recognition software  Occasional wrong word or "sound a like" substitutions may have occurred due to the inherent limitations of voice recognition software    Read the chart carefully and recognize, using context, where substitutions have occurred

## 2021-02-12 NOTE — CASE MANAGEMENT
LOS: 7 DAYS  PATIENT IS NOT A BUNDLE  PATIENT IS NOT A READMISSION  UNPLANNED RISK OF READMISSION: 23     CM spoke with the Patient's daughter, Juan Carlos Marion, via phone  Patient is Covid+ and has some confusion  Patient resides in a multi-level home with his spouse, son, and SHAUN  There is 1 MADDIE  Prior to admission, Patient was independent with all ADLs and utilized no DME  Patient was the primary caregiver for his spouse  Patient's spouse is also Covid+ and is currently at [facility]  Patient has no history of VNA/STR  Patient utilizes [employer] and is able to afford all of his medications  Patient has no MH/substance use history or concerns  Patient's daughter states that he was starting to experience some confusion prior to his hospitalization and states that he has a significant family history of dementia  Patient's daughter states that she and her brother are currently working on completing POA/AD paperwork  Patient's daughter states both she and her brother are the Patient's health care representatives  Patient is retired and receives [employer] as his main source of income  Patient is a community   Patient sees his PCP, Dr Gali Ashby, often due to INR checks  CM dept will continue to follow the Patient through dc and monitor for any dc needs  CM explained this to the Patient's daughter and will communicate recommendations with Patient and family as the Patient improves medically  CM reviewed discharge planning process including the following: identifying caregivers at home, preference for d/c planning needs, availability of treatment team to discuss questions or concerns patient and/or family may have regarding diagnosis, plan of care, old or new medications and discharge planning   CM will continue to follow for care coordination and update assessment as appropriate

## 2021-02-12 NOTE — ASSESSMENT & PLAN NOTE
· Patient became bradycardic with Precedex, discontinued  · Caution with antipsychotic medications with most recent QTC of 508,   · Pt is tolerating low-dose Zyprexa, daily EKGs  · Improved with sleep hygiene and melatonin  · Frequently reorient and reassure

## 2021-02-12 NOTE — ASSESSMENT & PLAN NOTE
· Currently rate controlled   · Echo demonstrates:  · EF of 60%,  · Mild right and left atrial dilation, mild right ventricle dilation  · Mitral valve:  moderate annular calcification and mild regurgitation  · Aortic valve:  mild stenosis and mild regurgitation  · Ascending aorta:  mild to moderate dilatation(4 7 cm)    · Small, free-flowing pericardial effusion circumferential to the heart without evidence of hemodynamic compromise  · Coumadin on hold on admission for supratherapeutic INR  · S/p Vitamin K 5mg on 2/9  · INR therapeutic on 2/11  · Restarted on 2/11  · Continue to trend INR

## 2021-02-13 NOTE — ASSESSMENT & PLAN NOTE
· Currently rate controlled   · No rate controlling agents at home   · Coumadin on hold on admission for supratherapeutic INR, continue therapeutic lovenox for now per COVID protocol

## 2021-02-13 NOTE — PLAN OF CARE
Patient Education   Tramadol use as directed. X-Ray of right hip, knee, and rib to be completed today. We will call you with the results of the X-rays. Follow up  as needed. Preventing Falls: Care Instructions  Your Care Instructions    Getting around your home safely can be a challenge if you have injuries or health problems that make it easy for you to fall. Loose rugs and furniture in walkways are among the dangers for many older people who have problems walking or who have poor eyesight. People who have conditions such as arthritis, osteoporosis, or dementia also have to be careful not to fall. You can make your home safer with a few simple measures. Follow-up care is a key part of your treatment and safety. Be sure to make and go to all appointments, and call your doctor if you are having problems. It's also a good idea to know your test results and keep a list of the medicines you take. How can you care for yourself at home? Taking care of yourself  · You may get dizzy if you do not drink enough water. To prevent dehydration, drink plenty of fluids, enough so that your urine is light yellow or clear like water. Choose water and other caffeine-free clear liquids. If you have kidney, heart, or liver disease and have to limit fluids, talk with your doctor before you increase the amount of fluids you drink. · Exercise regularly to improve your strength, muscle tone, and balance. Walk if you can. Swimming may be a good choice if you cannot walk easily. · Have your vision and hearing checked each year or any time you notice a change. If you have trouble seeing and hearing, you might not be able to avoid objects and could lose your balance. · Know the side effects of the medicines you take. Ask your doctor or pharmacist whether the medicines you take can affect your balance. Sleeping pills or sedatives can affect your balance. · Limit the amount of alcohol you drink.  Alcohol can impair your Problem: PAIN - ADULT  Goal: Verbalizes/displays adequate comfort level or baseline comfort level  Description: Interventions:  - Encourage patient to monitor pain and request assistance  - Assess pain using appropriate pain scale  - Administer analgesics based on type and severity of pain and evaluate response  - Implement non-pharmacological measures as appropriate and evaluate response  - Consider cultural and social influences on pain and pain management  - Notify physician/advanced practitioner if interventions unsuccessful or patient reports new pain  Outcome: Progressing     Problem: INFECTION - ADULT  Goal: Absence or prevention of progression during hospitalization  Description: INTERVENTIONS:  - Assess and monitor for signs and symptoms of infection  - Monitor lab/diagnostic results  - Monitor all insertion sites, i e  indwelling lines, tubes, and drains  - Monitor endotracheal if appropriate and nasal secretions for changes in amount and color  - [place] appropriate cooling/warming therapies per order  - Administer medications as ordered  - Instruct and encourage patient and family to use good hand hygiene technique  - Identify and instruct in appropriate isolation precautions for identified infection/condition  Outcome: Progressing     Problem: SAFETY ADULT  Goal: Patient will remain free of falls  Description: INTERVENTIONS:  - Assess patient frequently for physical needs  -  Identify cognitive and physical deficits and behaviors that affect risk of falls    -  [place] fall precautions as indicated by assessment   - Educate patient/family on patient safety including physical limitations  - Instruct patient to call for assistance with activity based on assessment  - Modify environment to reduce risk of injury  - Consider OT/PT consult to assist with strengthening/mobility  Outcome: Progressing  Goal: Maintain or return to baseline ADL function  Description: INTERVENTIONS:  -  Assess patient's ability to carry out ADLs; assess patient's baseline for ADL function and identify physical deficits which impact ability to perform ADLs (bathing, care of mouth/teeth, toileting, grooming, dressing, etc )  - Assess/evaluate cause of self-care deficits   - Assess range of motion  - Assess patient's mobility; develop plan if impaired  - Assess patient's need for assistive devices and provide as appropriate  - Encourage maximum independence but intervene and supervise when necessary  - Involve family in performance of ADLs  - Assess for home care needs following discharge   - Consider OT consult to assist with ADL evaluation and planning for discharge  - Provide patient education as appropriate  Outcome: Progressing  Goal: Maintain or return mobility status to optimal level  Description: INTERVENTIONS:  - Assess patient's baseline mobility status (ambulation, transfers, stairs, etc )    - Identify cognitive and physical deficits and behaviors that affect mobility  - Identify mobility aids required to assist with transfers and/or ambulation (gait belt, sit-to-stand, lift, walker, cane, etc )  - [place] fall precautions as indicated by assessment  - Record patient progress and toleration of activity level on Mobility SBAR; progress patient to next Phase/Stage  - Instruct patient to call for assistance with activity based on assessment  - Consider rehabilitation consult to assist with strengthening/weightbearing, etc   Outcome: Progressing     Problem: DISCHARGE PLANNING  Goal: Discharge to home or other facility with appropriate resources  Description: INTERVENTIONS:  - Identify barriers to discharge w/patient and caregiver  - Arrange for needed discharge resources and transportation as appropriate  - Identify discharge learning needs (meds, wound care, etc )  - Arrange for interpretive services to assist at discharge as needed  - Refer to Case Management Department for coordinating discharge planning if the patient needs post-hospital services based on physician/advanced practitioner order or complex needs related to functional status, cognitive ability, or social support system  Outcome: Progressing     Problem: Knowledge Deficit  Goal: Patient/family/caregiver demonstrates understanding of disease process, treatment plan, medications, and discharge instructions  Description: Complete learning assessment and assess knowledge base  Interventions:  - Provide teaching at level of understanding  - Provide teaching via preferred learning methods  Outcome: Progressing     Problem: Potential for Falls  Goal: Patient will remain free of falls  Description: INTERVENTIONS:  - Assess patient frequently for physical needs  -  Identify cognitive and physical deficits and behaviors that affect risk of falls    -  [place] fall precautions as indicated by assessment   - Educate patient/family on patient safety including physical limitations  - Instruct patient to call for assistance with activity based on assessment  - Modify environment to reduce risk of injury  - Consider OT/PT consult to assist with strengthening/mobility  Outcome: Progressing     Problem: Prexisting or High Potential for Compromised Skin Integrity  Goal: Skin integrity is maintained or improved  Description: INTERVENTIONS:  - Identify patients at risk for skin breakdown  - Assess and monitor skin integrity  - Assess and monitor nutrition and hydration status  - Monitor labs   - Assess for incontinence   - Turn and reposition patient  - Assist with mobility/ambulation  - Relieve pressure over bony prominences  - Avoid friction and shearing  - Provide appropriate hygiene as needed including keeping skin clean and dry  - Evaluate need for skin moisturizer/barrier cream  - Collaborate with interdisciplinary team   - Patient/family teaching  - Consider wound care consult   Outcome: Progressing     Problem: Nutrition/Hydration-ADULT  Goal: Nutrient/Hydration intake appropriate for improving, restoring or maintaining nutritional needs  Description: Monitor and assess patient's nutrition/hydration status for malnutrition  Collaborate with interdisciplinary team and initiate plan and interventions as ordered  Monitor patient's weight and dietary intake as ordered or per policy  Utilize nutrition screening tool and intervene as necessary  Determine patient's food preferences and provide high-protein, high-caloric foods as appropriate       INTERVENTIONS:  - Monitor oral intake, urinary output, labs, and treatment plans  - Assess nutrition and hydration status and recommend course of action  - Evaluate amount of meals eaten  - Assist patient with eating if necessary   - Allow adequate time for meals  - Recommend/ encourage appropriate diets, oral nutritional supplements, and vitamin/mineral supplements  - Order, calculate, and assess calorie counts as needed  - Recommend, monitor, and adjust tube feedings and TPN/PPN based on assessed needs  - Assess need for intravenous fluids  - Provide specific nutrition/hydration education as appropriate  - Include patient/family/caregiver in decisions related to nutrition  Outcome: Progressing     Problem: RESPIRATORY - ADULT  Goal: Achieves optimal ventilation and oxygenation  Description: INTERVENTIONS:  - Assess for changes in respiratory status  - Assess for changes in mentation and behavior  - Position to facilitate oxygenation and minimize respiratory effort  - Oxygen administered by appropriate delivery if ordered  - Initiate smoking cessation education as indicated  - Encourage broncho-pulmonary hygiene including cough, deep breathe, Incentive Spirometry  - Assess the need for suctioning and aspirate as needed  - Assess and instruct to report SOB or any respiratory difficulty  - Respiratory Therapy support as indicated  Outcome: Progressing     Problem: METABOLIC, FLUID AND ELECTROLYTES - ADULT  Goal: Electrolytes maintained within normal limits  Description: INTERVENTIONS:  - Monitor labs and assess patient for signs and symptoms of electrolyte imbalances  - Administer electrolyte replacement as ordered  - Monitor response to electrolyte replacements, including repeat lab results as appropriate  - Instruct patient on fluid and nutrition as appropriate  Outcome: Progressing  Goal: Fluid balance maintained  Description: INTERVENTIONS:  - Monitor labs   - Monitor I/O and WT  - Instruct patient on fluid and nutrition as appropriate  - Assess for signs & symptoms of volume excess or deficit  Outcome: Progressing     Problem: SAFETY,RESTRAINT: NV/NON-SELF DESTRUCTIVE BEHAVIOR  Goal: Remains free of harm/injury (restraint for non violent/non self-detsructive behavior)  Description: INTERVENTIONS:  - Instruct patient/family regarding restraint use   - Assess and monitor physiologic and psychological status   - Provide interventions and comfort measures to meet assessed patient needs   - Identify and implement measures to help patient regain control  - Assess readiness for release of restraint   Outcome: Progressing  Goal: Returns to optimal restraint-free functioning  Description: INTERVENTIONS:  - Assess the patient's behavior and symptoms that indicate continued need for restraint  - Identify and implement measures to help patient regain control  - Assess readiness for release of restraint   Outcome: Progressing prescription pain medicine, ask your doctor if you can take an over-the-counter medicine. · Rest and protect your knee. Take a break from any activity that may cause pain. · Put ice or a cold pack on your knee for 10 to 20 minutes at a time. Put a thin cloth between the ice and your skin. · Prop up a sore knee on a pillow when you ice it or anytime you sit or lie down for the next 3 days. Try to keep it above the level of your heart. This will help reduce swelling. · If your knee is not swollen, you can put moist heat, a heating pad, or a warm cloth on your knee. · If your doctor recommends an elastic bandage, sleeve, or other type of support for your knee, wear it as directed. · Follow your doctor's instructions about how much weight you can put on your leg. Use a cane, crutches, or a walker as instructed. · Follow your doctor's instructions about activity during your healing process. If you can do mild exercise, slowly increase your activity. · Reach and stay at a healthy weight. Extra weight can strain the joints, especially the knees and hips, and make the pain worse. Losing even a few pounds may help. When should you call for help? Call 911 anytime you think you may need emergency care. For example, call if:    · You have symptoms of a blood clot in your lung (called a pulmonary embolism). These may include:  ? Sudden chest pain. ? Trouble breathing. ? Coughing up blood.    Call your doctor now or seek immediate medical care if:    · You have severe or increasing pain.     · Your leg or foot turns cold or changes color.     · You cannot stand or put weight on your knee.     · Your knee looks twisted or bent out of shape.     · You cannot move your knee.     · You have signs of infection, such as:  ? Increased pain, swelling, warmth, or redness. ? Red streaks leading from the knee. ? Pus draining from a place on your knee.   ? A fever.     · You have signs of a blood clot in your leg (called a deep

## 2021-02-13 NOTE — PLAN OF CARE
Problem: PAIN - ADULT  Goal: Verbalizes/displays adequate comfort level or baseline comfort level  Description: Interventions:  - Encourage patient to monitor pain and request assistance  - Assess pain using appropriate pain scale  - Administer analgesics based on type and severity of pain and evaluate response  - Implement non-pharmacological measures as appropriate and evaluate response  - Consider cultural and social influences on pain and pain management  - Notify physician/advanced practitioner if interventions unsuccessful or patient reports new pain  Outcome: Progressing     Problem: INFECTION - ADULT  Goal: Absence or prevention of progression during hospitalization  Description: INTERVENTIONS:  - Assess and monitor for signs and symptoms of infection  - Monitor lab/diagnostic results  - Monitor all insertion sites, i e  indwelling lines, tubes, and drains  - Monitor endotracheal if appropriate and nasal secretions for changes in amount and color  - [place] appropriate cooling/warming therapies per order  - Administer medications as ordered  - Instruct and encourage patient and family to use good hand hygiene technique  - Identify and instruct in appropriate isolation precautions for identified infection/condition  Outcome: Progressing     Problem: SAFETY ADULT  Goal: Patient will remain free of falls  Description: INTERVENTIONS:  - Assess patient frequently for physical needs  -  Identify cognitive and physical deficits and behaviors that affect risk of falls    -  [place] fall precautions as indicated by assessment   - Educate patient/family on patient safety including physical limitations  - Instruct patient to call for assistance with activity based on assessment  - Modify environment to reduce risk of injury  - Consider OT/PT consult to assist with strengthening/mobility  Outcome: Progressing  Goal: Maintain or return to baseline ADL function  Description: INTERVENTIONS:  -  Assess patient's ability to carry out ADLs; assess patient's baseline for ADL function and identify physical deficits which impact ability to perform ADLs (bathing, care of mouth/teeth, toileting, grooming, dressing, etc )  - Assess/evaluate cause of self-care deficits   - Assess range of motion  - Assess patient's mobility; develop plan if impaired  - Assess patient's need for assistive devices and provide as appropriate  - Encourage maximum independence but intervene and supervise when necessary  - Involve family in performance of ADLs  - Assess for home care needs following discharge   - Consider OT consult to assist with ADL evaluation and planning for discharge  - Provide patient education as appropriate  Outcome: Progressing  Goal: Maintain or return mobility status to optimal level  Description: INTERVENTIONS:  - Assess patient's baseline mobility status (ambulation, transfers, stairs, etc )    - Identify cognitive and physical deficits and behaviors that affect mobility  - Identify mobility aids required to assist with transfers and/or ambulation (gait belt, sit-to-stand, lift, walker, cane, etc )  - [place] fall precautions as indicated by assessment  - Record patient progress and toleration of activity level on Mobility SBAR; progress patient to next Phase/Stage  - Instruct patient to call for assistance with activity based on assessment  - Consider rehabilitation consult to assist with strengthening/weightbearing, etc   Outcome: Progressing     Problem: DISCHARGE PLANNING  Goal: Discharge to home or other facility with appropriate resources  Description: INTERVENTIONS:  - Identify barriers to discharge w/patient and caregiver  - Arrange for needed discharge resources and transportation as appropriate  - Identify discharge learning needs (meds, wound care, etc )  - Arrange for interpretive services to assist at discharge as needed  - Refer to Case Management Department for coordinating discharge planning if the patient needs post-hospital services based on physician/advanced practitioner order or complex needs related to functional status, cognitive ability, or social support system  Outcome: Progressing     Problem: Knowledge Deficit  Goal: Patient/family/caregiver demonstrates understanding of disease process, treatment plan, medications, and discharge instructions  Description: Complete learning assessment and assess knowledge base  Interventions:  - Provide teaching at level of understanding  - Provide teaching via preferred learning methods  Outcome: Progressing     Problem: Potential for Falls  Goal: Patient will remain free of falls  Description: INTERVENTIONS:  - Assess patient frequently for physical needs  -  Identify cognitive and physical deficits and behaviors that affect risk of falls    -  [place] fall precautions as indicated by assessment   - Educate patient/family on patient safety including physical limitations  - Instruct patient to call for assistance with activity based on assessment  - Modify environment to reduce risk of injury  - Consider OT/PT consult to assist with strengthening/mobility  Outcome: Progressing     Problem: Prexisting or High Potential for Compromised Skin Integrity  Goal: Skin integrity is maintained or improved  Description: INTERVENTIONS:  - Identify patients at risk for skin breakdown  - Assess and monitor skin integrity  - Assess and monitor nutrition and hydration status  - Monitor labs   - Assess for incontinence   - Turn and reposition patient  - Assist with mobility/ambulation  - Relieve pressure over bony prominences  - Avoid friction and shearing  - Provide appropriate hygiene as needed including keeping skin clean and dry  - Evaluate need for skin moisturizer/barrier cream  - Collaborate with interdisciplinary team   - Patient/family teaching  - Consider wound care consult   Outcome: Progressing     Problem: Nutrition/Hydration-ADULT  Goal: Nutrient/Hydration intake appropriate for improving, restoring or maintaining nutritional needs  Description: Monitor and assess patient's nutrition/hydration status for malnutrition  Collaborate with interdisciplinary team and initiate plan and interventions as ordered  Monitor patient's weight and dietary intake as ordered or per policy  Utilize nutrition screening tool and intervene as necessary  Determine patient's food preferences and provide high-protein, high-caloric foods as appropriate       INTERVENTIONS:  - Monitor oral intake, urinary output, labs, and treatment plans  - Assess nutrition and hydration status and recommend course of action  - Evaluate amount of meals eaten  - Assist patient with eating if necessary   - Allow adequate time for meals  - Recommend/ encourage appropriate diets, oral nutritional supplements, and vitamin/mineral supplements  - Order, calculate, and assess calorie counts as needed  - Recommend, monitor, and adjust tube feedings and TPN/PPN based on assessed needs  - Assess need for intravenous fluids  - Provide specific nutrition/hydration education as appropriate  - Include patient/family/caregiver in decisions related to nutrition  Outcome: Progressing     Problem: RESPIRATORY - ADULT  Goal: Achieves optimal ventilation and oxygenation  Description: INTERVENTIONS:  - Assess for changes in respiratory status  - Assess for changes in mentation and behavior  - Position to facilitate oxygenation and minimize respiratory effort  - Oxygen administered by appropriate delivery if ordered  - Initiate smoking cessation education as indicated  - Encourage broncho-pulmonary hygiene including cough, deep breathe, Incentive Spirometry  - Assess the need for suctioning and aspirate as needed  - Assess and instruct to report SOB or any respiratory difficulty  - Respiratory Therapy support as indicated  Outcome: Progressing     Problem: METABOLIC, FLUID AND ELECTROLYTES - ADULT  Goal: Electrolytes maintained within normal limits  Description: INTERVENTIONS:  - Monitor labs and assess patient for signs and symptoms of electrolyte imbalances  - Administer electrolyte replacement as ordered  - Monitor response to electrolyte replacements, including repeat lab results as appropriate  - Instruct patient on fluid and nutrition as appropriate  Outcome: Progressing  Goal: Fluid balance maintained  Description: INTERVENTIONS:  - Monitor labs   - Monitor I/O and WT  - Instruct patient on fluid and nutrition as appropriate  - Assess for signs & symptoms of volume excess or deficit  Outcome: Progressing     Problem: SAFETY,RESTRAINT: NV/NON-SELF DESTRUCTIVE BEHAVIOR  Goal: Remains free of harm/injury (restraint for non violent/non self-detsructive behavior)  Description: INTERVENTIONS:  - Instruct patient/family regarding restraint use   - Assess and monitor physiologic and psychological status   - Provide interventions and comfort measures to meet assessed patient needs   - Identify and implement measures to help patient regain control  - Assess readiness for release of restraint   Outcome: Progressing  Goal: Returns to optimal restraint-free functioning  Description: INTERVENTIONS:  - Assess the patient's behavior and symptoms that indicate continued need for restraint  - Identify and implement measures to help patient regain control  - Assess readiness for release of restraint   Outcome: Progressing

## 2021-02-13 NOTE — PROGRESS NOTES
Progress Note - Raul Carla 1945, 70 y o  male MRN: [id number]    Unit/Bed#: S -01 Encounter: [id number]    Primary Care Provider: No primary care provider on file  Date and time admitted to hospital: 2/5/2021 11:04 AM        * Pneumonia due to COVID-19 virus  Assessment & Plan  · Continue severe pathway treatment protocol  ? On HFNC 100%/40L +/- NRB   ? Antibiotics discontinued due to negative procal x 2  ? Completed course of Remdesivir   ? Continue steroids, Day 8/10  ? Consider Actemra as patient had BM today    ? Continue lovenox per COVID protocol   ? Encourage SP, and incentive spirometry  ? Wean O2 to maintain saturations > 88-90%      Acute respiratory failure with hypoxia (HCC)  Assessment & Plan  · Secondary to COVID PNA  · Continue treatment per protocol  · Continue HFNC +/- NRB to maintain SpO2 >88%     Chronic atrial fibrillation (HCC)  Assessment & Plan  · Currently rate controlled   · No rate controlling agents at home   · Coumadin on hold on admission for supratherapeutic INR, continue therapeutic lovenox for now per COVID protocol       Delirium due to general medical condition  Assessment & Plan  · Started on zyprexa HS with improvement, consider stopping   · Continue melatonin       ----------------------------------------------------------------------------------------  HPI/24hr events: Remained on HFNC  No acute issues  Improvement in mental status  Disposition: Continue Stepdown Level 1 level of care   Code Status: Level 1 - Full Code  ---------------------------------------------------------------------------------------  SUBJECTIVE  "I feel pretty good"    Review of Systems   Constitutional: Negative  HENT: Negative  Eyes: Negative  Respiratory: Negative  Cardiovascular: Negative  Gastrointestinal: Negative  Endocrine: Negative  Genitourinary: Negative  Musculoskeletal: Negative  Skin: Negative  Allergic/Immunologic: Negative      Neurological: Negative  Hematological: Negative  Psychiatric/Behavioral: Negative  Review of systems was reviewed and negative unless stated above in HPI/24-hour events   ---------------------------------------------------------------------------------------  OBJECTIVE    Vitals   Vitals:    21 1100 21 1200 21 1500 21 1557   BP: 118/71  127/78    BP Location:   Right arm    Pulse: (!) 123  104    Resp: (!) 24  (!) 30    Temp: 98 1 °F (36 7 °C)  97 7 °F (36 5 °C)    TempSrc: Axillary  Axillary    SpO2: 92% 90% (!) 86% (!) 89%   Weight:         Temp (24hrs), Av 9 °F (36 6 °C), Min:97 7 °F (36 5 °C), Max:98 1 °F (36 7 °C)  Current: Temperature: 97 7 °F (36 5 °C)          Respiratory:  SpO2: SpO2: (!) 89 %  , SpO2 Device: O2 Device: High flow nasal cannula  Nasal Cannula O2 Flow Rate (L/min): 40 L/min    Invasive/non-invasive ventilation settings   Respiratory    Lab Data (Last 4 hours)    None         O2/Vent Data (Last 4 hours)       1557          Non-Invasive Ventilation Mode HFNC (High flow)                   Physical Exam  Constitutional:       Appearance: Normal appearance  HENT:      Head: Normocephalic and atraumatic  Mouth/Throat:      Mouth: Mucous membranes are moist    Eyes:      Pupils: Pupils are equal, round, and reactive to light  Neck:      Musculoskeletal: Neck supple  Cardiovascular:      Rate and Rhythm: Tachycardia present  Rhythm irregular  Heart sounds: No murmur  No friction rub  Pulmonary:      Effort: Pulmonary effort is normal  No respiratory distress  Breath sounds: No wheezing or rales  Comments: diminished  Abdominal:      General: Bowel sounds are normal  There is no distension  Palpations: Abdomen is soft  Tenderness: There is no abdominal tenderness  Musculoskeletal: Normal range of motion  General: No swelling  Skin:     General: Skin is warm and dry        Capillary Refill: Capillary refill takes less than 2 seconds  Neurological:      General: No focal deficit present  Mental Status: He is alert and oriented to person, place, and time  Cranial Nerves: No cranial nerve deficit  Sensory: No sensory deficit  Motor: Weakness present           Laboratory and Diagnostics:  Results from last 7 days   Lab Units 02/13/21  0551 02/11/21  0401 02/10/21  0653 02/09/21  0540 02/08/21  0516 02/07/21  0528   WBC Thousand/uL 13 53* 10 77* 9 67 10 15 11 56* 6 96   HEMOGLOBIN g/dL 16 4 14 4 14 4 14 2 15 8 15 7   HEMATOCRIT % 50 3* 45 2 44 8 43 5 48 6 49 0   PLATELETS Thousands/uL 167 300 314 313 315 267   NEUTROS PCT % 91* 94*  --   --   --  88*   BANDS PCT %  --   --  4  --  2  --    MONOS PCT % 5 3*  --   --   --  5   MONO PCT %  --   --  1*  --  7  --      Results from last 7 days   Lab Units 02/13/21  0551 02/11/21  0401 02/10/21  0702 02/09/21  0540 02/08/21  0516 02/07/21  0528   SODIUM mmol/L 139 140 141 140 140 138   POTASSIUM mmol/L 5 0 5 0 5 1 4 5 4 3 4 4   CHLORIDE mmol/L 105 107 109* 110* 108 105   CO2 mmol/L 28 26 24 22 24 24   ANION GAP mmol/L 6 7 8 8 8 9   BUN mg/dL 42* 36* 29* 33* 36* 33*   CREATININE mg/dL 1 01 0 98 0 84 0 97 1 19 1 13   CALCIUM mg/dL 8 3 8 2* 8 4 8 6 8 4 8 5   GLUCOSE RANDOM mg/dL 105 138 116 113 119 128   ALT U/L  --   --   --   --   --  41   AST U/L  --   --   --   --   --  62*   ALK PHOS U/L  --   --   --   --   --  119*   ALBUMIN g/dL  --   --   --   --   --  2 5*   TOTAL BILIRUBIN mg/dL  --   --   --   --   --  1 28*     Results from last 7 days   Lab Units 02/13/21  0551 02/10/21  0702 02/09/21  0540 02/08/21  0516 02/07/21  0528   MAGNESIUM mg/dL 2 4 2 4 2 5 2 3 2 2   PHOSPHORUS mg/dL  --   --   --  2 8 2 5      Results from last 7 days   Lab Units 02/12/21  0441 02/11/21  0401 02/10/21  0703 02/09/21  0540 02/07/21  0528   INR  2 83* 2 86* 3 90* 4 52* 4 14*              ABG:    VBG:    Results from last 7 days   Lab Units 02/07/21  0528   PROCALCITONIN ng/ml 0 19 Micro        EKG:   Imaging: I have personally reviewed pertinent reports  and I have personally reviewed pertinent films in PACS    Intake and Output  I/O       02/11 0701 - 02/12 0700 02/12 0701 - 02/13 0700 02/13 0701 - 02/14 0700    P  O  1440 540     IV Piggyback 50      Total Intake(mL/kg) 1490 (15 3) 540 (5 6)     Urine (mL/kg/hr) 1150 (0 5) 1025 (0 4) 400 (0 6)    Stool  0     Total Output 1150 1025 400    Net +340 -485 -400           Unmeasured Stool Occurrence  1 x           Height and Weights      IBW: -88 kg  Body mass index is 28 21 kg/m²  Weight (last 2 days)     Date/Time   Weight    02/13/21 0000   97 (213 85)    02/12/21 0537   97 3 (214 51)    02/12/21 0500   97 3 (214 51)    02/11/21 0557   98 3 (216 71)                Nutrition       Diet Orders   (From admission, onward)             Start     Ordered    02/11/21 1339  Dietary nutrition supplements  Once     Question Answer Comment   Select Supplement: Ensure Enlive-Vanilla    Frequency Breakfast, Dinner        02/11/21 1338    02/05/21 2218  Room Service  Once     Question:  Type of Service  Answer:  Room Service - Appropriate with Assistance    02/05/21 2218    02/05/21 1722  Diet Cardiovascular; Cardiac  Diet effective now     Question Answer Comment   Diet Type Cardiovascular    Cardiac Cardiac    RD to adjust diet per protocol?  Yes        02/05/21 1721                  Active Medications  Scheduled Meds:  Current Facility-Administered Medications   Medication Dose Route Frequency Provider Last Rate    atorvastatin  40 mg Oral HS Aura Mejia PA-C      benzonatate  100 mg Oral TID PRN Nathaniel Wynne PA-C      cholecalciferol  2,000 Units Oral Daily Aura Mejia PA-C      dexamethasone  0 1 mg/kg Intravenous Q12H SKINNY Hodges 10 9 mg (02/13/21 1327)    enoxaparin  1 mg/kg Subcutaneous Q12H [address] SKINNY Diaz      famotidine  20 mg Oral BID Aura Mejia PA-C      melatonin  10 5 mg Oral HS SKINNY Solorzano      multivitamin-minerals  1 tablet Oral Daily Aura Mejia PA-C      OLANZapine  5 mg Oral HS SKINNY Solorzano      ondansetron  4 mg Intravenous Q6H PRN Aura Mejia PA-C      polyethylene glycol  17 g Oral Daily Perezsteven CapellanSKINNY      senna-docusate sodium  1 tablet Oral BID Telma HendricksonSKINNY      tamsulosin  0 4 mg Oral Daily Aura Mejia PA-C       Continuous Infusions:     PRN Meds:   benzonatate, 100 mg, TID PRN  ondansetron, 4 mg, Q6H PRN        Invasive Devices Review  Invasive Devices     Peripheral Intravenous Line            Peripheral IV 02/12/21 Right;Ventral (anterior) Forearm 1 day                  ---------------------------------------------------------------------------------------  Advance Directive and Living Will:      Power of :    POLST:    ---------------------------------------------------------------------------------------  Care Time Delivered:   No Critical Care time spent       [employer]      Portions of the record may have been created with voice recognition software  Occasional wrong word or "sound a like" substitutions may have occurred due to the inherent limitations of voice recognition software    Read the chart carefully and recognize, using context, where substitutions have occurred

## 2021-02-13 NOTE — ASSESSMENT & PLAN NOTE
· Continue severe pathway treatment protocol  ? On HFNC 100%/40L +/- NRB   ? Antibiotics discontinued due to negative procal x 2  ? Completed course of Remdesivir   ? Continue steroids, Day 8/10  ? Consider Actemra as patient had BM today    ? Continue lovenox per COVID protocol   ? Encourage SP, and incentive spirometry  ?  Wean O2 to maintain saturations > 88-90%

## 2021-02-14 NOTE — ASSESSMENT & PLAN NOTE
· Continue severe pathway treatment protocol  ? On HFNC 100%/40L +/- NRB   ? Antibiotics discontinued due to negative procal x 2  ? Completed course of Remdesivir   ? Continue steroids, Day 9/10  ? Consider Actemra as patient had BM today    ? Continue lovenox per COVID protocol   ? Encourage SP, and incentive spirometry  ?  Wean O2 to maintain saturations > 88-90%

## 2021-02-14 NOTE — ASSESSMENT & PLAN NOTE
· Currently rate controlled   · No rate controlling agents at home, would consider addition of metoprolol if patient has RVR   · Coumadin on hold on admission for supratherapeutic INR, continue therapeutic lovenox for now per COVID protocol

## 2021-02-14 NOTE — ASSESSMENT & PLAN NOTE
· Started on zyprexa HS with improvement, still continues with periods of confusion and agitation overnight   · Monitor QTC   · Continue melatonin

## 2021-02-14 NOTE — PROGRESS NOTES
Progress Note - Lupillo Radha 1945, 70 y o  male MRN: [id number]    Unit/Bed#: S -01 Encounter: [id number]    Primary Care Provider: No primary care provider on file  Date and time admitted to hospital: 2/5/2021 11:04 AM        * Pneumonia due to COVID-19 virus  Assessment & Plan  · Continue severe pathway treatment protocol  ? On HFNC 100%/40L +/- NRB   ? Antibiotics discontinued due to negative procal x 2  ? Completed course of Remdesivir   ? Continue steroids, Day 9/10  ? Consider Actemra as patient had BM today    ? Continue lovenox per COVID protocol   ? Encourage SP, and incentive spirometry  ? Wean O2 to maintain saturations > 88-90%      Acute respiratory failure with hypoxia (HCC)  Assessment & Plan  · Secondary to COVID PNA  · Continue treatment per protocol  · Continue HFNC +/- NRB to maintain SpO2 >88%     Chronic atrial fibrillation (HCC)  Assessment & Plan  · Currently rate controlled   · No rate controlling agents at home, would consider addition of metoprolol if patient has RVR   · Coumadin on hold on admission for supratherapeutic INR, continue therapeutic lovenox for now per COVID protocol       Delirium due to general medical condition  Assessment & Plan  · Started on zyprexa HS with improvement, still continues with periods of confusion and agitation overnight   · Monitor QTC   · Continue melatonin       ----------------------------------------------------------------------------------------  HPI/24hr events: Remained on HFNC 100% 45L with intermittent use of NRB  No other acute events  Disposition: Continue Stepdown Level 1 level of care   Code Status: Level 1 - Full Code  ---------------------------------------------------------------------------------------  SUBJECTIVE  "I am good" Patient denies shortness of breath, states he feels good and would like to go home     Review of Systems   Constitutional: Negative  HENT: Negative  Eyes: Negative  Respiratory: Negative  Cardiovascular: Negative  Gastrointestinal: Negative  Endocrine: Negative  Genitourinary: Negative  Musculoskeletal: Negative  Skin: Negative  Allergic/Immunologic: Negative  Neurological: Negative  Hematological: Negative  Psychiatric/Behavioral: Negative  Review of systems was reviewed and negative unless stated above in HPI/24-hour events   ---------------------------------------------------------------------------------------  OBJECTIVE    Vitals   Vitals:    21 0250 21 0326 21 0700 21 0742   BP: 124/68  119/69    BP Location: Right arm      Pulse: 94  96    Resp: (!) 24  18    Temp: 98 °F (36 7 °C)  97 7 °F (36 5 °C)    TempSrc: Axillary  Axillary    SpO2: 97% 90% 92% 93%   Weight:         Temp (24hrs), Av °F (36 7 °C), Min:97 7 °F (36 5 °C), Max:98 2 °F (36 8 °C)  Current: Temperature: 97 7 °F (36 5 °C)          Respiratory:  SpO2: SpO2: 93 %, SpO2 Device: O2 Device: High flow nasal cannula  Nasal Cannula O2 Flow Rate (L/min): 40 L/min    Invasive/non-invasive ventilation settings   Respiratory    Lab Data (Last 4 hours)    None         O2/Vent Data (Last 4 hours)       07          Non-Invasive Ventilation Mode HFNC (High flow)                   Physical Exam  Constitutional:       General: He is not in acute distress  Appearance: He is ill-appearing  HENT:      Head: Normocephalic and atraumatic  Mouth/Throat:      Mouth: Mucous membranes are moist    Eyes:      Pupils: Pupils are equal, round, and reactive to light  Neck:      Musculoskeletal: Neck supple  Cardiovascular:      Rate and Rhythm: Normal rate  Rhythm irregular  Pulses: Normal pulses  Heart sounds: Normal heart sounds  Pulmonary:      Effort: Pulmonary effort is normal       Comments: Diminished breath sounds   Abdominal:      General: Bowel sounds are normal  There is no distension  Palpations: Abdomen is soft  Tenderness:  There is no abdominal tenderness  Musculoskeletal: Normal range of motion  General: No swelling  Skin:     General: Skin is warm and dry  Neurological:      Mental Status: He is alert and oriented to person, place, and time  Cranial Nerves: No cranial nerve deficit  Sensory: No sensory deficit  Motor: Weakness present     Psychiatric:         Mood and Affect: Mood normal          Laboratory and Diagnostics:  Results from last 7 days   Lab Units 02/14/21  0659 02/13/21  0551 02/11/21  0401 02/10/21  0653 02/09/21  0540 02/08/21  0516   WBC Thousand/uL 12 30* 13 53* 10 77* 9 67 10 15 11 56*   HEMOGLOBIN g/dL 15 8 16 4 14 4 14 4 14 2 15 8   HEMATOCRIT % 48 8 50 3* 45 2 44 8 43 5 48 6   PLATELETS Thousands/uL 137* 167 300 314 313 315   NEUTROS PCT %  --  91* 94*  --   --   --    BANDS PCT %  --   --   --  4  --  2   MONOS PCT %  --  5 3*  --   --   --    MONO PCT %  --   --   --  1*  --  7     Results from last 7 days   Lab Units 02/14/21  0659 02/13/21  0551 02/11/21  0401 02/10/21  0702 02/09/21  0540 02/08/21  0516   SODIUM mmol/L 140 139 140 141 140 140   POTASSIUM mmol/L 5 2 5 0 5 0 5 1 4 5 4 3   CHLORIDE mmol/L 106 105 107 109* 110* 108   CO2 mmol/L 26 28 26 24 22 24   ANION GAP mmol/L 8 6 7 8 8 8   BUN mg/dL 43* 42* 36* 29* 33* 36*   CREATININE mg/dL 0 95 1 01 0 98 0 84 0 97 1 19   CALCIUM mg/dL 8 3 8 3 8 2* 8 4 8 6 8 4   GLUCOSE RANDOM mg/dL 118 105 138 116 113 119   ALT U/L 25  --   --   --   --   --    AST U/L 53*  --   --   --   --   --    ALK PHOS U/L 115  --   --   --   --   --    ALBUMIN g/dL 2 1*  --   --   --   --   --    TOTAL BILIRUBIN mg/dL 2 58*  --   --   --   --   --      Results from last 7 days   Lab Units 02/13/21  0551 02/10/21  0702 02/09/21  0540 02/08/21  0516   MAGNESIUM mg/dL 2 4 2 4 2 5 2 3   PHOSPHORUS mg/dL  --   --   --  2 8      Results from last 7 days   Lab Units 02/12/21  0441 02/11/21  0401 02/10/21  0703 02/09/21  0540   INR  2 83* 2 86* 3 90* 4 52* ABG:    VBG:          Micro        EKG: Afib rate 90  Imaging: I have personally reviewed pertinent reports  and I have personally reviewed pertinent films in PACS    Intake and Output  I/O       02/12 0701 - 02/13 0700 02/13 0701 - 02/14 0700 02/14 0701 - 02/15 0700    P  O  540      IV Piggyback       Total Intake(mL/kg) 540 (5 6)      Urine (mL/kg/hr) 1025 (0 4) 400 (0 2)     Stool 0      Total Output 1025 400     Net -485 -400            Unmeasured Stool Occurrence 1 x            Height and Weights      IBW: -88 kg  Body mass index is 28 21 kg/m²  Weight (last 2 days)     Date/Time   Weight    02/13/21 0000   97 (213 85)    02/12/21 0537   97 3 (214 51)    02/12/21 0500   97 3 (214 51)                Nutrition       Diet Orders   (From admission, onward)             Start     Ordered    02/11/21 1339  Dietary nutrition supplements  Once     Question Answer Comment   Select Supplement: Ensure Enlive-Vanilla    Frequency Breakfast, Dinner        02/11/21 1338    02/05/21 2218  Room Service  Once     Question:  Type of Service  Answer:  Room Service - Appropriate with Assistance    02/05/21 2218    02/05/21 1722  Diet Cardiovascular; Cardiac  Diet effective now     Question Answer Comment   Diet Type Cardiovascular    Cardiac Cardiac    RD to adjust diet per protocol?  Yes        02/05/21 1721                  Active Medications  Scheduled Meds:  Current Facility-Administered Medications   Medication Dose Route Frequency Provider Last Rate    atorvastatin  40 mg Oral HS Aura Mejia PA-C      benzonatate  100 mg Oral TID PRN Damaris Connors PA-C      cholecalciferol  2,000 Units Oral Daily Aura Mejia PA-C      dexamethasone  0 1 mg/kg Intravenous Q12H SKINNY [place] 10 9 mg (02/14/21 0238)    enoxaparin  1 mg/kg Subcutaneous Q12H [facility] SKINNY Diaz      famotidine  20 mg Oral BID Aura Mejia PA-C      melatonin  10 5 mg Oral HS SKINNY Leslie  multivitamin-minerals  1 tablet Oral Daily Aura Mejia PA-C      OLANZapine  5 mg Oral HS SKINNY Camejo      ondansetron  4 mg Intravenous Q6H PRN Aura Mejia PA-C      polyethylene glycol  17 g Oral Daily DelfinaSKINNY Urena      senna-docusate sodium  1 tablet Oral BID SKINNY Colon      tamsulosin  0 4 mg Oral Daily Aura Mejia PA-C       Continuous Infusions:     PRN Meds:   benzonatate, 100 mg, TID PRN  ondansetron, 4 mg, Q6H PRN        Invasive Devices Review  Invasive Devices     Peripheral Intravenous Line            Peripheral IV 02/12/21 Right;Ventral (anterior) Forearm 2 days                  ---------------------------------------------------------------------------------------  Advance Directive and Living Will:      Power of :    POLST:    ---------------------------------------------------------------------------------------  Care Time Delivered:   Upon my evaluation, this patient had a high probability of imminent or life-threatening deterioration due to COVID, hypoxia, which required my direct attention, intervention, and personal management  I have personally provided 31 minutes (0800 to 0831) of critical care time, exclusive of procedures, teaching, family meetings, and any prior time recorded by providers other than myself  SKINNY Rangel      Portions of the record may have been created with voice recognition software  Occasional wrong word or "sound a like" substitutions may have occurred due to the inherent limitations of voice recognition software    Read the chart carefully and recognize, using context, where substitutions have occurred

## 2021-02-15 NOTE — PLAN OF CARE
Problem: PAIN - ADULT  Goal: Verbalizes/displays adequate comfort level or baseline comfort level  Description: Interventions:  - Encourage patient to monitor pain and request assistance  - Assess pain using appropriate pain scale  - Administer analgesics based on type and severity of pain and evaluate response  - Implement non-pharmacological measures as appropriate and evaluate response  - Consider cultural and social influences on pain and pain management  - Notify physician/advanced practitioner if interventions unsuccessful or patient reports new pain  Outcome: Progressing     Problem: INFECTION - ADULT  Goal: Absence or prevention of progression during hospitalization  Description: INTERVENTIONS:  - Assess and monitor for signs and symptoms of infection  - Monitor lab/diagnostic results  - Monitor all insertion sites, i e  indwelling lines, tubes, and drains  - Monitor endotracheal if appropriate and nasal secretions for changes in amount and color  - [place] appropriate cooling/warming therapies per order  - Administer medications as ordered  - Instruct and encourage patient and family to use good hand hygiene technique  - Identify and instruct in appropriate isolation precautions for identified infection/condition  Outcome: Progressing     Problem: SAFETY ADULT  Goal: Patient will remain free of falls  Description: INTERVENTIONS:  - Assess patient frequently for physical needs  -  Identify cognitive and physical deficits and behaviors that affect risk of falls    -  [place] fall precautions as indicated by assessment   - Educate patient/family on patient safety including physical limitations  - Instruct patient to call for assistance with activity based on assessment  - Modify environment to reduce risk of injury  - Consider OT/PT consult to assist with strengthening/mobility  Outcome: Progressing  Goal: Maintain or return to baseline ADL function  Description: INTERVENTIONS:  -  Assess patient's ability to carry out ADLs; assess patient's baseline for ADL function and identify physical deficits which impact ability to perform ADLs (bathing, care of mouth/teeth, toileting, grooming, dressing, etc )  - Assess/evaluate cause of self-care deficits   - Assess range of motion  - Assess patient's mobility; develop plan if impaired  - Assess patient's need for assistive devices and provide as appropriate  - Encourage maximum independence but intervene and supervise when necessary  - Involve family in performance of ADLs  - Assess for home care needs following discharge   - Consider OT consult to assist with ADL evaluation and planning for discharge  - Provide patient education as appropriate  Outcome: Progressing  Goal: Maintain or return mobility status to optimal level  Description: INTERVENTIONS:  - Assess patient's baseline mobility status (ambulation, transfers, stairs, etc )    - Identify cognitive and physical deficits and behaviors that affect mobility  - Identify mobility aids required to assist with transfers and/or ambulation (gait belt, sit-to-stand, lift, walker, cane, etc )  - [place] fall precautions as indicated by assessment  - Record patient progress and toleration of activity level on Mobility SBAR; progress patient to next Phase/Stage  - Instruct patient to call for assistance with activity based on assessment  - Consider rehabilitation consult to assist with strengthening/weightbearing, etc   Outcome: Progressing     Problem: DISCHARGE PLANNING  Goal: Discharge to home or other facility with appropriate resources  Description: INTERVENTIONS:  - Identify barriers to discharge w/patient and caregiver  - Arrange for needed discharge resources and transportation as appropriate  - Identify discharge learning needs (meds, wound care, etc )  - Arrange for interpretive services to assist at discharge as needed  - Refer to Case Management Department for coordinating discharge planning if the patient needs post-hospital services based on physician/advanced practitioner order or complex needs related to functional status, cognitive ability, or social support system  Outcome: Progressing     Problem: Potential for Falls  Goal: Patient will remain free of falls  Description: INTERVENTIONS:  - Assess patient frequently for physical needs  -  Identify cognitive and physical deficits and behaviors that affect risk of falls  -  [place] fall precautions as indicated by assessment   - Educate patient/family on patient safety including physical limitations  - Instruct patient to call for assistance with activity based on assessment  - Modify environment to reduce risk of injury  - Consider OT/PT consult to assist with strengthening/mobility  Outcome: Progressing     Problem: Knowledge Deficit  Goal: Patient/family/caregiver demonstrates understanding of disease process, treatment plan, medications, and discharge instructions  Description: Complete learning assessment and assess knowledge base    Interventions:  - Provide teaching at level of understanding  - Provide teaching via preferred learning methods  Outcome: Progressing     Problem: Prexisting or High Potential for Compromised Skin Integrity  Goal: Skin integrity is maintained or improved  Description: INTERVENTIONS:  - Identify patients at risk for skin breakdown  - Assess and monitor skin integrity  - Assess and monitor nutrition and hydration status  - Monitor labs   - Assess for incontinence   - Turn and reposition patient  - Assist with mobility/ambulation  - Relieve pressure over bony prominences  - Avoid friction and shearing  - Provide appropriate hygiene as needed including keeping skin clean and dry  - Evaluate need for skin moisturizer/barrier cream  - Collaborate with interdisciplinary team   - Patient/family teaching  - Consider wound care consult   Outcome: Progressing     Problem: METABOLIC, FLUID AND ELECTROLYTES - ADULT  Goal: Electrolytes maintained within normal limits  Description: INTERVENTIONS:  - Monitor labs and assess patient for signs and symptoms of electrolyte imbalances  - Administer electrolyte replacement as ordered  - Monitor response to electrolyte replacements, including repeat lab results as appropriate  - Instruct patient on fluid and nutrition as appropriate  Outcome: Progressing  Goal: Fluid balance maintained  Description: INTERVENTIONS:  - Monitor labs   - Monitor I/O and WT  - Instruct patient on fluid and nutrition as appropriate  - Assess for signs & symptoms of volume excess or deficit  Outcome: Progressing     Problem: RESPIRATORY - ADULT  Goal: Achieves optimal ventilation and oxygenation  Description: INTERVENTIONS:  - Assess for changes in respiratory status  - Assess for changes in mentation and behavior  - Position to facilitate oxygenation and minimize respiratory effort  - Oxygen administered by appropriate delivery if ordered  - Initiate smoking cessation education as indicated  - Encourage broncho-pulmonary hygiene including cough, deep breathe, Incentive Spirometry  - Assess the need for suctioning and aspirate as needed  - Assess and instruct to report SOB or any respiratory difficulty  - Respiratory Therapy support as indicated  Outcome: Progressing     Problem: SAFETY,RESTRAINT: NV/NON-SELF DESTRUCTIVE BEHAVIOR  Goal: Remains free of harm/injury (restraint for non violent/non self-detsructive behavior)  Description: INTERVENTIONS:  - Instruct patient/family regarding restraint use   - Assess and monitor physiologic and psychological status   - Provide interventions and comfort measures to meet assessed patient needs   - Identify and implement measures to help patient regain control  - Assess readiness for release of restraint   Outcome: Progressing  Goal: Returns to optimal restraint-free functioning  Description: INTERVENTIONS:  - Assess the patient's behavior and symptoms that indicate continued need for restraint  - Identify and implement measures to help patient regain control  - Assess readiness for release of restraint   Outcome: Progressing     Problem: METABOLIC, FLUID AND ELECTROLYTES - ADULT  Goal: Electrolytes maintained within normal limits  Description: INTERVENTIONS:  - Monitor labs and assess patient for signs and symptoms of electrolyte imbalances  - Administer electrolyte replacement as ordered  - Monitor response to electrolyte replacements, including repeat lab results as appropriate  - Instruct patient on fluid and nutrition as appropriate  Outcome: Progressing  Goal: Fluid balance maintained  Description: INTERVENTIONS:  - Monitor labs   - Monitor I/O and WT  - Instruct patient on fluid and nutrition as appropriate  - Assess for signs & symptoms of volume excess or deficit  Outcome: Progressing

## 2021-02-15 NOTE — QUICK NOTE
I called and spoke with the patient's son Juliann Barlow and updated him on any overnight events and the plan moving forward  I answered any questions he had and he was very appreciative of the call

## 2021-02-15 NOTE — ASSESSMENT & PLAN NOTE
· Metoprolol 12 5 BID for rate control  · Coumadin on hold on admission for supratherapeutic INR, continue therapeutic lovenox for now per COVID protocol

## 2021-02-15 NOTE — ASSESSMENT & PLAN NOTE
· Continue severe pathway treatment protocol  ? On HFNC 100%/40L +/- NRB   ? Antibiotics discontinued due to negative procal x 2  ? Completed course of Remdesivir   ? Continue steroids, Day 10/10  ? Actemra 2/14  ? Continue lovenox per COVID protocol   ? Encourage self-proning and incentive spirometry  ?  Wean O2 to maintain saturations > 88-90%

## 2021-02-15 NOTE — ASSESSMENT & PLAN NOTE
· Continue severe pathway treatment protocol  ? On HFNC 100%/40L +/- NRB   ? Antibiotics discontinued due to negative procal x 2  ? Completed course of Remdesivir   ? Continue steroids, decreased to daily  ? Actemra 2/14  ? Continue lovenox per COVID protocol   ? Encourage self-proning and incentive spirometry  ?  Wean O2 to maintain saturations > 88-90%

## 2021-02-15 NOTE — PROGRESS NOTES
Progress Note - Trevon Sargent 1945, 70 y o  male MRN: [id number]    Unit/Bed#: S -[facility] Encounter: [id number]    Primary Care Provider: No primary care provider on file  Date and time admitted to hospital: 2/5/2021 11:04 AM    Assessment and Plan:  Delirium due to general medical condition  Assessment & Plan  · Started on zyprexa HS with improvement  · Monitor QTC with daily EKG  · Continue melatonin     Chronic atrial fibrillation (HCC)  Assessment & Plan  · Currently rate controlled   · No rate controlling agents at home, would consider addition of metoprolol if patient has RVR   · Coumadin on hold on admission for supratherapeutic INR, continue therapeutic lovenox for now per COVID protocol       Acute respiratory failure with hypoxia (HCC)  Assessment & Plan  · Secondary to COVID PNA  · Continue treatment per protocol  · Continue HFNC +/- NRB to maintain SpO2 >88%     * Pneumonia due to COVID-19 virus  Assessment & Plan  · Continue severe pathway treatment protocol  ? On HFNC 100%/40L +/- NRB   ? Antibiotics discontinued due to negative procal x 2  ? Completed course of Remdesivir   ? Continue steroids, Day 10/10  ? Actemra 2/14  ? Continue lovenox per COVID protocol   ? Encourage self-proning and incentive spirometry  ? Wean O2 to maintain saturations > 88-90%      ----------------------------------------------------------------------------------------  HPI/24hr events: No overnight events    HFNC 100%/45L  WBC, D-dimer improving from yesterday  Ferritin, CRP pending     ---------------------------------------------------------------------------------------  SUBJECTIVE  Reports feeling well today, still c/o SOB and dry cough unchanged from previous  He does report feeling week in his legs  Review of Systems   Constitutional: Negative for appetite change, chills, diaphoresis and fever  Respiratory: Positive for cough and shortness of breath  Negative for chest tightness      Cardiovascular: Negative for chest pain, palpitations and leg swelling  Gastrointestinal: Negative for abdominal pain, constipation, diarrhea, nausea and vomiting  Genitourinary: Negative for difficulty urinating and hematuria  Skin: Negative for color change and rash  Neurological: Positive for weakness  Negative for dizziness and headaches      ---------------------------------------------------------------------------------------    Disposition: Continue Stepdown Level 1 level of care   Code Status: Level 1 - Full Code  ---------------------------------------------------------------------------------------  ICU CORE MEASURES    Prophylaxis   VTE Pharmacologic Prophylaxis: Enoxaparin (Lovenox)  VTE Mechanical Prophylaxis: sequential compression device  Stress Ulcer Prophylaxis: Famotidine PO and Prophylaxis Not Indicated     ABCDE Protocol (if indicated)  Plan to perform spontaneous awakening trial today? Not applicable  Plan to perform spontaneous breathing trial today? Not applicable  Obvious barriers to extubation? Not applicable  CAM-ICU: N/A    Invasive Devices Review  Invasive Devices     Peripheral Intravenous Line            Peripheral IV 21 Right;Ventral (anterior) Forearm 3 days              Can any invasive devices be discontinued today?  No  ---------------------------------------------------------------------------------------  OBJECTIVE    Vitals   Vitals:    21 2251 02/15/21 0300 02/15/21 0410 02/15/21 0531   BP:  106/71     BP Location:  Right arm     Pulse:  89     Resp:  20     Temp:  97 5 °F (36 4 °C)     TempSrc:  Oral     SpO2: 92% 91% 90%    Weight:    95 4 kg (210 lb 5 1 oz)     Temp (24hrs), Av 6 °F (36 4 °C), Min:97 5 °F (36 4 °C), Max:97 8 °F (36 6 °C)  Current: Temperature: 97 5 °F (36 4 °C)  HR: 100  BP: 106/71  RR: 20  SpO2: 90%    Respiratory:  SpO2: SpO2: 90 %, SpO2 Activity: SpO2 Activity: At Rest, SpO2 Device: O2 Device: High flow nasal cannula  Nasal Cannula O2 Flow Rate (L/min): 40 L/min    Invasive/non-invasive ventilation settings   Respiratory    Lab Data (Last 4 hours)    None         O2/Vent Data (Last 4 hours)      02/15 0410          Non-Invasive Ventilation Mode HFNC (High flow)                   Physical Exam  Constitutional:       General: He is not in acute distress  Appearance: He is normal weight  He is ill-appearing  HENT:      Head: Normocephalic and atraumatic  Nose: Nose normal       Mouth/Throat:      Mouth: Mucous membranes are moist       Pharynx: Oropharynx is clear  Eyes:      Extraocular Movements: Extraocular movements intact  Conjunctiva/sclera: Conjunctivae normal    Cardiovascular:      Rate and Rhythm: Normal rate and regular rhythm  Pulses: Normal pulses  Heart sounds: Normal heart sounds  Pulmonary:      Breath sounds: Normal breath sounds  No wheezing or rhonchi  Comments: Increased effort  Abdominal:      General: Abdomen is flat  Bowel sounds are normal       Palpations: Abdomen is soft  Skin:     General: Skin is warm and dry  Capillary Refill: Capillary refill takes less than 2 seconds  Coloration: Skin is not pale  Neurological:      General: No focal deficit present  Mental Status: He is alert and oriented to person, place, and time     Psychiatric:         Mood and Affect: Mood normal          Behavior: Behavior normal          Laboratory and Diagnostics:  Results from last 7 days   Lab Units 02/15/21  0526 02/14/21  0659 02/13/21  0551 02/11/21  0401 02/10/21  0653 02/09/21  0540   WBC Thousand/uL 11 17* 12 30* 13 53* 10 77* 9 67 10 15   HEMOGLOBIN g/dL 17 2* 15 8 16 4 14 4 14 4 14 2   HEMATOCRIT % 53 2* 48 8 50 3* 45 2 44 8 43 5   PLATELETS Thousands/uL 145* 137* 167 300 314 313   NEUTROS PCT %  --   --  91* 94*  --   --    BANDS PCT %  --  5  --   --  4  --    MONOS PCT %  --   --  5 3*  --   --    MONO PCT % 0* 4  --   --  1*  --      Results from last 7 days   Lab Units 02/14/21  0659 02/13/21  0551 02/11/21  0401 02/10/21  0702 02/09/21  0540   SODIUM mmol/L 140 139 140 141 140   POTASSIUM mmol/L 5 2 5 0 5 0 5 1 4 5   CHLORIDE mmol/L 106 105 107 109* 110*   CO2 mmol/L 26 28 26 24 22   ANION GAP mmol/L 8 6 7 8 8   BUN mg/dL 43* 42* 36* 29* 33*   CREATININE mg/dL 0 95 1 01 0 98 0 84 0 97   CALCIUM mg/dL 8 3 8 3 8 2* 8 4 8 6   GLUCOSE RANDOM mg/dL 118 105 138 116 113   ALT U/L 25  --   --   --   --    AST U/L 53*  --   --   --   --    ALK PHOS U/L 115  --   --   --   --    ALBUMIN g/dL 2 1*  --   --   --   --    TOTAL BILIRUBIN mg/dL 2 58*  --   --   --   --      Results from last 7 days   Lab Units 02/13/21  0551 02/10/21  0702 02/09/21  0540   MAGNESIUM mg/dL 2 4 2 4 2 5      Results from last 7 days   Lab Units 02/12/21  0441 02/11/21  0401 02/10/21  0703 02/09/21  0540   INR  2 83* 2 86* 3 90* 4 52*              ABG:    VBG:          Micro        EKG: wtc 430, HR 84, afib  Imaging:  I have personally reviewed pertinent reports  Intake and Output  I/O       02/13 0701 - 02/14 0700 02/14 0701 - 02/15 0700 02/15 0701 - 02/16 0700    P  O        IV Piggyback  126     Total Intake(mL/kg)  126 (1 3)     Urine (mL/kg/hr) 400 (0 2) 325 (0 1)     Stool  0     Total Output 400 325     Net -400 -199            Unmeasured Stool Occurrence  1 x         Height and Weights      IBW: -88 kg  Body mass index is 27 75 kg/m²    Weight (last 2 days)     Date/Time   Weight    02/15/21 0531   95 4 (210 32)    02/13/21 0000   97 (213 85)                Nutrition       Diet Orders   (From admission, onward)             Start     Ordered    02/11/21 1339  Dietary nutrition supplements  Once     Question Answer Comment   Select Supplement: Ensure Enlive-Vanilla    Frequency Breakfast, Dinner        02/11/21 1338    02/05/21 2218  Room Service  Once     Question:  Type of Service  Answer:  Room Service - Appropriate with Assistance    02/05/21 2218 02/05/21 1722  Diet Cardiovascular; Cardiac  Diet effective now Question Answer Comment   Diet Type Cardiovascular    Cardiac Cardiac    RD to adjust diet per protocol? Yes        02/05/21 3261              Active Medications  Scheduled Meds:  Current Facility-Administered Medications   Medication Dose Route Frequency Provider Last Rate    atorvastatin  40 mg Oral HS Aura Mejia PA-C      benzonatate  100 mg Oral TID PRN Graciela Cox PA-C      cholecalciferol  2,000 Units Oral Daily Aura Mejia PA-C      dexamethasone  0 1 mg/kg Intravenous Q12H SKINNY Buck 10 9 mg (02/15/21 0147)    enoxaparin  1 mg/kg Subcutaneous Q12H [address] SKINNY Diaz      famotidine  20 mg Oral BID Aura Mejia PA-C      lactulose  20 g Oral Daily Khadijahgerri SpiveySKINNY      melatonin  10 5 mg Oral HS Azul YangSKINNY      multivitamin-minerals  1 tablet Oral Daily Aura Mejia PA-C      OLANZapine  5 mg Oral HS SKINNY Diaz      ondansetron  4 mg Intravenous Q6H PRN Aura Mejia PA-C      polyethylene glycol  17 g Oral Daily Justin MeghnaSKINNY      senna-docusate sodium  1 tablet Oral BID Darcia Begun, SKINNY      tamsulosin  0 4 mg Oral Daily Aura Mejia PA-C       Continuous Infusions:     PRN Meds:   benzonatate, 100 mg, TID PRN  ondansetron, 4 mg, Q6H PRN        Allergies   No Known Allergies  ---------------------------------------------------------------------------------------  Advance Directive and Living Will:      Power of :    POLST:    ---------------------------------------------------------------------------------------  Care Time Delivered:   No Critical Care time spent     Odalys Mayorga, DO      Portions of the record may have been created with voice recognition software  Occasional wrong word or "sound a like" substitutions may have occurred due to the inherent limitations of voice recognition software    Read the chart carefully and recognize, using context, where substitutions have occurred

## 2021-02-16 PROBLEM — R53.1 WEAKNESS: Status: ACTIVE | Noted: 2021-01-01

## 2021-02-16 PROBLEM — D72.829 LEUKOCYTOSIS: Status: ACTIVE | Noted: 2021-01-01

## 2021-02-16 NOTE — OCCUPATIONAL THERAPY NOTE
Occupational Therapy Progress Note     Patient Name: Jodie Sotelo  [id number] Date: 2/16/2021  Problem List  Principal Problem:    Pneumonia due to COVID-19 virus  Active Problems:    Coagulopathy ([facility] )    Acute respiratory failure with hypoxia ([facility] )    Chronic atrial fibrillation ([facility] )    Delirium due to general medical condition    Leukocytosis    Weakness              02/16/21 1359   OT Last Visit   OT Visit Date 02/16/21   Note Type   Note Type Treatment   Restrictions/Precautions   Weight Bearing Precautions Per Order No   Other Precautions Contact/isolation; Airborne/isolation;Cognitive; Chair Alarm; Bed Alarm;O2;Multiple lines;Telemetry; Fall Risk  (HFNC + nonrebreather)   Pain Assessment   Pain Assessment Tool Pain Assessment not indicated - pt denies pain   Pain Score No Pain   ADL   Grooming Assistance 5  Supervision/Setup   Grooming Deficit Increased time to complete;Verbal cueing;Supervision/safety   Grooming Comments MAX VC for sequencing of washing face and combing hair   Bed Mobility   Additional Comments OOB and in chair upon arrival and end of session   Transfers   Additional Comments Pt adamantly declining all transfers at this time, agreeable to "chair push-ups" (see exercises below) able to complete chair push-ups and clear buttocks from chair with (S)   Functional Mobility   Additional Comments DNT   Therapeutic Excerise-Strength   UE Strength Yes   Right Upper Extremity- Strength   R Weight/Reps/Sets 20 reps x2 of overhead press, forward punches shldr ab/adduction and bicep curls  x5 reps of chair push-ups   Left Upper Extremity-Strength   L Weights/Reps/Sets 20 reps x2 of overhead press, forward punches shldr ab/adduction and bicep curls  x5 reps of chair push-ups   Cognition   Overall Cognitive Status Impaired   Arousal/Participation Alert; Cooperative   Attention Attends with cues to redirect   Orientation Level Oriented to person;Disoriented to place; Disoriented to time;Disoriented to situation Memory Decreased recall of precautions;Decreased recall of recent events;Decreased short term memory   Following Commands Follows one step commands with increased time or repetition   Comments Pt with decreased insights into deficits, poor memory, poor problem solving  Pt with circular conversation and requiring max VC for attention to task   Assessment   Assessment Pt agreeable to participate in skilled OT treatment session to address ADL retraining, functional transfer training, endurance training, patient/family training, equipment evaluation/education, energy conservation, activity engagement and activity tolerance  Pt seated in chair upon arrival, with call bell going off  When asked what pt needed, pt stating "I'm confused, how did I get here"  Pt re-oriented  Pt adamantly declining standing and functional mobility at this time  Pt agreeable to grooming tasks sitting in chair, requiring min VC throughout for sequencing of tasks  Pt completing BUE therapeutic exercises in order to increase strength and endurance for ADL tasks  Pt noted to have O2 maintaining 88-92% with functional activity, and HR elevating to 120-130s  Pt with inconsistent cognition and alertness throughout and required to be re-oriented four separate times during the session  Pt notably fatigued and required max rest breaks and VC  Patient continues to be performing below their functional baseline with an increased risk for falls and injury and decreased occupational performance, and would benefit from continued skilled OT treatment to address deficits, The patient's raw score on the AM-PAC Daily Activity inpatient short form is 15, standardized score is 34 69, less than 39 4  Patients at this level are likely to benefit from DC to post-acute rehabilitation services   Continue to recommend PAR upon d/c     Plan   Goal Expiration Date 02/24/21   OT Treatment Day 1   OT Frequency 3-5x/wk   Recommendation   OT Discharge Recommendation Post-Acute Rehabilitation Services   AM-PAC Daily Activity Inpatient   Lower Body Dressing 2   Bathing 2   Toileting 2   Upper Body Dressing 3   Grooming 3   Eating 3   Daily Activity Raw Score 15   Daily Activity Standardized Score (Calc for Raw Score >=11) 34 69   AM-PAC Applied Cognition Inpatient   Following a Speech/Presentation 1   Understanding Ordinary Conversation 1   Taking Medications 1   Remembering Where Things Are Placed or Put Away 1   Remembering List of 4-5 Errands 1   Taking Care of Complicated Tasks 1   Applied Cognition Raw Score 6   Applied Cognition Standardized Score 7 69            At the end of the session, all needs met and pt seated in bedside chair, chair alarm activated, LEs elevated  and call bell within reach    [place] Peed, OTR/L

## 2021-02-16 NOTE — ASSESSMENT & PLAN NOTE
· WBC 11>14 today, Afebrile  · Lines: peripheral IVs  · CXR 2/5 - Suspected bilateral Covid pneumonia  · Physical exam unchanged, denies fever/chills/pain  · Likely 2/2 steroids  · Plan  · Continue to monitor am cbc

## 2021-02-16 NOTE — ASSESSMENT & PLAN NOTE
· Continue severe pathway treatment protocol  ? On HFNC 65%/30L +/- NRB   ? Antibiotics discontinued due to negative procal x 2  ? Completed course of Remdesivir   ? Continue steroids, Day 12, continue to taper  ? Actemra 2/14  ? Continue lovenox per COVID protocol   ? Encourage self-proning and incentive spirometry  ? Trend CRP, D-Dimer, Ferritin q48hr  ?  Wean O2 to maintain saturations > 88-90%

## 2021-02-16 NOTE — QUICK NOTE
I called and spoke with the patient's son, Jacinda Hyde  I updated him on his father's current status and plans for treatment  I informed him that we did cut back on his oxygen support today, and that we will continue to monitor him and wean as able  I answered any questions he had and he was very appreciative of the call

## 2021-02-16 NOTE — ASSESSMENT & PLAN NOTE
· PT/OT consulted  · They will continue to follow inpatient, and they recommend post acute rehab at this time

## 2021-02-16 NOTE — PLAN OF CARE
Problem: PAIN - ADULT  Goal: Verbalizes/displays adequate comfort level or baseline comfort level  Description: Interventions:  - Encourage patient to monitor pain and request assistance  - Assess pain using appropriate pain scale  - Administer analgesics based on type and severity of pain and evaluate response  - Implement non-pharmacological measures as appropriate and evaluate response  - Consider cultural and social influences on pain and pain management  - Notify physician/advanced practitioner if interventions unsuccessful or patient reports new pain  Outcome: Progressing     Problem: INFECTION - ADULT  Goal: Absence or prevention of progression during hospitalization  Description: INTERVENTIONS:  - Assess and monitor for signs and symptoms of infection  - Monitor lab/diagnostic results  - Monitor all insertion sites, i e  indwelling lines, tubes, and drains  - Monitor endotracheal if appropriate and nasal secretions for changes in amount and color  - [place] appropriate cooling/warming therapies per order  - Administer medications as ordered  - Instruct and encourage patient and family to use good hand hygiene technique  - Identify and instruct in appropriate isolation precautions for identified infection/condition  Outcome: Progressing     Problem: SAFETY ADULT  Goal: Patient will remain free of falls  Description: INTERVENTIONS:  - Assess patient frequently for physical needs  -  Identify cognitive and physical deficits and behaviors that affect risk of falls    -  [place] fall precautions as indicated by assessment   - Educate patient/family on patient safety including physical limitations  - Instruct patient to call for assistance with activity based on assessment  - Modify environment to reduce risk of injury  - Consider OT/PT consult to assist with strengthening/mobility  Outcome: Progressing  Goal: Maintain or return to baseline ADL function  Description: INTERVENTIONS:  -  Assess patient's ability to carry out ADLs; assess patient's baseline for ADL function and identify physical deficits which impact ability to perform ADLs (bathing, care of mouth/teeth, toileting, grooming, dressing, etc )  - Assess/evaluate cause of self-care deficits   - Assess range of motion  - Assess patient's mobility; develop plan if impaired  - Assess patient's need for assistive devices and provide as appropriate  - Encourage maximum independence but intervene and supervise when necessary  - Involve family in performance of ADLs  - Assess for home care needs following discharge   - Consider OT consult to assist with ADL evaluation and planning for discharge  - Provide patient education as appropriate  Outcome: Progressing  Goal: Maintain or return mobility status to optimal level  Description: INTERVENTIONS:  - Assess patient's baseline mobility status (ambulation, transfers, stairs, etc )    - Identify cognitive and physical deficits and behaviors that affect mobility  - Identify mobility aids required to assist with transfers and/or ambulation (gait belt, sit-to-stand, lift, walker, cane, etc )  - [place] fall precautions as indicated by assessment  - Record patient progress and toleration of activity level on Mobility SBAR; progress patient to next Phase/Stage  - Instruct patient to call for assistance with activity based on assessment  - Consider rehabilitation consult to assist with strengthening/weightbearing, etc   Outcome: Progressing     Problem: DISCHARGE PLANNING  Goal: Discharge to home or other facility with appropriate resources  Description: INTERVENTIONS:  - Identify barriers to discharge w/patient and caregiver  - Arrange for needed discharge resources and transportation as appropriate  - Identify discharge learning needs (meds, wound care, etc )  - Arrange for interpretive services to assist at discharge as needed  - Refer to Case Management Department for coordinating discharge planning if the patient needs post-hospital services based on physician/advanced practitioner order or complex needs related to functional status, cognitive ability, or social support system  Outcome: Progressing     Problem: Knowledge Deficit  Goal: Patient/family/caregiver demonstrates understanding of disease process, treatment plan, medications, and discharge instructions  Description: Complete learning assessment and assess knowledge base  Interventions:  - Provide teaching at level of understanding  - Provide teaching via preferred learning methods  Outcome: Progressing     Problem: Potential for Falls  Goal: Patient will remain free of falls  Description: INTERVENTIONS:  - Assess patient frequently for physical needs  -  Identify cognitive and physical deficits and behaviors that affect risk of falls    -  [place] fall precautions as indicated by assessment   - Educate patient/family on patient safety including physical limitations  - Instruct patient to call for assistance with activity based on assessment  - Modify environment to reduce risk of injury  - Consider OT/PT consult to assist with strengthening/mobility  Outcome: Progressing     Problem: Prexisting or High Potential for Compromised Skin Integrity  Goal: Skin integrity is maintained or improved  Description: INTERVENTIONS:  - Identify patients at risk for skin breakdown  - Assess and monitor skin integrity  - Assess and monitor nutrition and hydration status  - Monitor labs   - Assess for incontinence   - Turn and reposition patient  - Assist with mobility/ambulation  - Relieve pressure over bony prominences  - Avoid friction and shearing  - Provide appropriate hygiene as needed including keeping skin clean and dry  - Evaluate need for skin moisturizer/barrier cream  - Collaborate with interdisciplinary team   - Patient/family teaching  - Consider wound care consult   Outcome: Progressing     Problem: Nutrition/Hydration-ADULT  Goal: Nutrient/Hydration intake appropriate for improving, restoring or maintaining nutritional needs  Description: Monitor and assess patient's nutrition/hydration status for malnutrition  Collaborate with interdisciplinary team and initiate plan and interventions as ordered  Monitor patient's weight and dietary intake as ordered or per policy  Utilize nutrition screening tool and intervene as necessary  Determine patient's food preferences and provide high-protein, high-caloric foods as appropriate       INTERVENTIONS:  - Monitor oral intake, urinary output, labs, and treatment plans  - Assess nutrition and hydration status and recommend course of action  - Evaluate amount of meals eaten  - Assist patient with eating if necessary   - Allow adequate time for meals  - Recommend/ encourage appropriate diets, oral nutritional supplements, and vitamin/mineral supplements  - Order, calculate, and assess calorie counts as needed  - Recommend, monitor, and adjust tube feedings and TPN/PPN based on assessed needs  - Assess need for intravenous fluids  - Provide specific nutrition/hydration education as appropriate  - Include patient/family/caregiver in decisions related to nutrition  Outcome: Progressing     Problem: RESPIRATORY - ADULT  Goal: Achieves optimal ventilation and oxygenation  Description: INTERVENTIONS:  - Assess for changes in respiratory status  - Assess for changes in mentation and behavior  - Position to facilitate oxygenation and minimize respiratory effort  - Oxygen administered by appropriate delivery if ordered  - Initiate smoking cessation education as indicated  - Encourage broncho-pulmonary hygiene including cough, deep breathe, Incentive Spirometry  - Assess the need for suctioning and aspirate as needed  - Assess and instruct to report SOB or any respiratory difficulty  - Respiratory Therapy support as indicated  Outcome: Progressing     Problem: METABOLIC, FLUID AND ELECTROLYTES - ADULT  Goal: Electrolytes maintained within normal limits  Description: INTERVENTIONS:  - Monitor labs and assess patient for signs and symptoms of electrolyte imbalances  - Administer electrolyte replacement as ordered  - Monitor response to electrolyte replacements, including repeat lab results as appropriate  - Instruct patient on fluid and nutrition as appropriate  Outcome: Progressing  Goal: Fluid balance maintained  Description: INTERVENTIONS:  - Monitor labs   - Monitor I/O and WT  - Instruct patient on fluid and nutrition as appropriate  - Assess for signs & symptoms of volume excess or deficit  Outcome: Progressing     Problem: SAFETY,RESTRAINT: NV/NON-SELF DESTRUCTIVE BEHAVIOR  Goal: Remains free of harm/injury (restraint for non violent/non self-detsructive behavior)  Description: INTERVENTIONS:  - Instruct patient/family regarding restraint use   - Assess and monitor physiologic and psychological status   - Provide interventions and comfort measures to meet assessed patient needs   - Identify and implement measures to help patient regain control  - Assess readiness for release of restraint   Outcome: Progressing  Goal: Returns to optimal restraint-free functioning  Description: INTERVENTIONS:  - Assess the patient's behavior and symptoms that indicate continued need for restraint  - Identify and implement measures to help patient regain control  - Assess readiness for release of restraint   Outcome: Progressing

## 2021-02-16 NOTE — PHYSICAL THERAPY NOTE
PHYSICAL THERAPY TREATMENT NOTE    Patient Name: Mo Ceballos  LWBUN'L Date: 21 1215   PT Last Visit   PT Visit Date 21   Note Type   Note Type Treatment   Pain Assessment   Pain Assessment Tool Pain Assessment not indicated - pt denies pain   Pain Score No Pain   Restrictions/Precautions   Weight Bearing Precautions Per Order No   Other Precautions Contact/isolation; Airborne/isolation; Chair Alarm; Bed Alarm;Multiple lines;Telemetry;O2;Fall Risk;Cognitive  (HFNC + nonrebreather)   General   Chart Reviewed Yes   Response to Previous Treatment Patient with no complaints from previous session  Family/Caregiver Present No   Cognition   Overall Cognitive Status Impaired   Arousal/Participation Alert   Attention Attends with cues to redirect   Comments Pt identified by name and   Subjective   Subjective Agrees to PT treatment, however repeats multiple times, "I can't do it, you'll see "   Transfers   Sit to Stand 3  Moderate assistance  (varies from mod/max A x1)   Additional items Assist x 1; Increased time required;Verbal cues;Armrests  (hand placement)   Stand to Sit 3  Moderate assistance   Additional items Assist x 1; Increased time required;Verbal cues; Impulsive  (hand placement)   Additional Comments x6 sit to stand trials performed with ~1` standing tolerance at longest for hygiene and pericare    (SpO2 decreased to 68% at lowest; ~2-3` recovery time )   Ambulation/Elevation   Gait pattern Not appropriate  (due to SOB/O2 sats dropping and fatigue/weakness)   Balance   Static Sitting Fair +   Dynamic Sitting Fair   Static Standing Poor  (with RW)   Endurance Deficit   Endurance Deficit Yes   Endurance Deficit Description limited standing tolerance and O2 sats dropping   Activity Tolerance   Activity Tolerance Patient limited by fatigue   Nurse Made Aware Per RN Jose Eduardo Aldrich, pt appropriate to treat, present and assisting with hygiene   Exercises   Knee AROM Long Arc Xormis reps;AROM; Bilateral  (x2)   Ankle Pumps Sitting;20 reps;AROM; Bilateral  (x2)   Marching Sitting;10 reps;AROM; Bilateral  (x2)   Assessment   Prognosis Fair   Problem List Decreased strength;Decreased endurance; Impaired balance;Decreased mobility; Decreased safety awareness; Impaired hearing   Assessment Pt agrees to PT treatment, however requires education and motivation to participate  No progress noted this session as pt requires increased assist level to complete sit to stand trials  Unable to ambulate this session due to increased decreases in SpO2, pt weakness/fatigue and need for continuous bowel movement hygiene  Able to tolerate seated LE exercises fairly  Extended seated rest breaks required for O2 recovery  Will continue to benefit from ongoing skilled PT to maximize his functional mobility and increase his level of independence  Recommending rehab at time of discharge when medically appropriate  Goals   Patient Goals to get better   STG Expiration Date 02/21/21   PT Treatment Day 2   Plan   Treatment/Interventions Functional transfer training;LE strengthening/ROM; Therapeutic exercise; Endurance training;Patient/family training;Equipment eval/education; Bed mobility;Gait training   Progress No functional improvements   PT Frequency 5x/wk   Recommendation   PT Discharge Recommendation Post-Acute Rehabilitation Services   Equipment Recommended Walker   AM-PAC Basic Mobility Inpatient   Turning in Bed Without Bedrails 3   Lying on Back to Sitting on Edge of Flat Bed 2   Moving Bed to Chair 2   Standing Up From Chair 2   Walk in Room 2   Climb 3-5 Stairs 1   Basic Mobility Inpatient Raw Score 12   Basic Mobility Standardized Score 32 23   Ole Luz, PT,DPT

## 2021-02-16 NOTE — ASSESSMENT & PLAN NOTE
· WBC 14 yesterday, Afebrile, lab holiday today  · Lines: peripheral IVs  · CXR 2/5 - Suspected bilateral Covid pneumonia  · Physical exam unchanged, denies fever/chills/pain  · Likely 2/2 steroids  · Plan  · Continue to monitor am cbc tomorrow

## 2021-02-16 NOTE — PLAN OF CARE
Problem: PHYSICAL THERAPY ADULT  Goal: Performs mobility at highest level of function for planned discharge setting  See evaluation for individualized goals  Description: Treatment/Interventions: Functional transfer training, LE strengthening/ROM, Elevations, Therapeutic exercise, Endurance training, Patient/family training, Equipment eval/education, Bed mobility, Gait training  Equipment Recommended: Latoya Diez       See flowsheet documentation for full assessment, interventions and recommendations  Outcome: Not Progressing  Note: Prognosis: Fair  Problem List: Decreased strength, Decreased endurance, Impaired balance, Decreased mobility, Decreased safety awareness, Impaired hearing  Assessment: Pt agrees to PT treatment, however requires education and motivation to participate  No progress noted this session as pt requires increased assist level to complete sit to stand trials  Unable to ambulate this session due to increased decreases in SpO2, pt weakness/fatigue and need for continuous bowel movement hygiene  Able to tolerate seated LE exercises fairly  Extended seated rest breaks required for O2 recovery  Will continue to benefit from ongoing skilled PT to maximize his functional mobility and increase his level of independence  Recommending rehab at time of discharge when medically appropriate  PT Discharge Recommendation: Post-Acute Rehabilitation Services          See flowsheet documentation for full assessment

## 2021-02-16 NOTE — ASSESSMENT & PLAN NOTE
· PT/OT consulted  · Last eval on 2/11  · Will reach out to PT for update on plan to continue inpatient management

## 2021-02-16 NOTE — PROGRESS NOTES
Progress Note - Merline Furry 1945, 70 y o  male MRN: [id number]    Unit/Bed#: ICU 11 Encounter: [id number]    Primary Care Provider: No primary care provider on file  Date and time admitted to hospital: 2/5/2021 11:04 AM    Weakness  Assessment & Plan  · PT/OT consulted  · Last eval on 2/11  · Will reach out to PT for update on plan to continue inpatient management  Leukocytosis  Assessment & Plan  · WBC 11>14 today, Afebrile  · Lines: peripheral IVs  · CXR 2/5 - Suspected bilateral Covid pneumonia  · Physical exam unchanged, denies fever/chills/pain  · Likely 2/2 steroids  · Plan  · Continue to monitor am cbc    Delirium due to general medical condition  Assessment & Plan  · Started on zyprexa HS with improvement  · EKG   · Continue melatonin hs    Chronic atrial fibrillation (HCC)  Assessment & Plan  · Metoprolol 12 5 BID for rate control  · Coumadin on hold on admission for supratherapeutic INR, continue therapeutic lovenox for now per COVID protocol       Acute respiratory failure with hypoxia (HCC)  Assessment & Plan  · Secondary to COVID PNA  · Continue treatment per protocol  · Continue HFNC +/- NRB to maintain SpO2 >88%     * Pneumonia due to COVID-19 virus  Assessment & Plan  · Continue severe pathway treatment protocol  ? On HFNC 100%/40L +/- NRB   ? Antibiotics discontinued due to negative procal x 2  ? Completed course of Remdesivir   ? Continue steroids, decreased to daily  ? Actemra 2/14  ? Continue lovenox per COVID protocol   ? Encourage self-proning and incentive spirometry  ? Trend CRP, D-Dimer, Ferritin q48hr  ? Wean O2 to maintain saturations > 88-90%      ----------------------------------------------------------------------------------------  HPI/24hr events: no overnight events    ---------------------------------------------------------------------------------------  SUBJECTIVE  Patient continues to have SOB and dry cough unchanged   He also continues to report LE weakness from lack of ambulation  Review of Systems   Constitutional: Negative for appetite change, chills and fever  Respiratory: Positive for cough and shortness of breath  Cardiovascular: Negative for chest pain, palpitations and leg swelling  Gastrointestinal: Negative for abdominal pain, constipation, diarrhea, nausea and vomiting  Genitourinary: Negative for difficulty urinating, dysuria, frequency and hematuria  Skin: Negative for color change and rash  Neurological: Positive for weakness  Psychiatric/Behavioral: Negative for agitation  The patient is not nervous/anxious       ---------------------------------------------------------------------------------------  Disposition: Continue Stepdown Level 1 level of care   Code Status: Level 1 - Full Code  ---------------------------------------------------------------------------------------  ICU CORE MEASURES    Prophylaxis   VTE Pharmacologic Prophylaxis: Enoxaparin (Lovenox)  VTE Mechanical Prophylaxis: sequential compression device  Stress Ulcer Prophylaxis: Famotidine PO    ABCDE Protocol (if indicated)  Plan to perform spontaneous awakening trial today? Not applicable  Plan to perform spontaneous breathing trial today? Not applicable  Obvious barriers to extubation? Not applicable  CAM-ICU: n/a    Invasive Devices Review  Invasive Devices     Peripheral Intravenous Line            Peripheral IV 21 Right;Ventral (anterior) Forearm 4 days    Peripheral IV 21 Left Arm less than 1 day              Can any invasive devices be discontinued today?  No  ---------------------------------------------------------------------------------------  OBJECTIVE    Vitals   Vitals:    21 1115 21 1132 21 1140 21 1211   BP: 134/81      BP Location:       Pulse: 99 93  (!) 117   Resp: (!) 35 (!) 32  (!) 53   Temp:       TempSrc:       SpO2: 94% 97% 96% 97%   Weight:         Temp (24hrs), Av 9 °F (36 6 °C), Min:97 8 °F (36 6 °C), Max:98 °F (36 7 °C)  Current: Temperature: 97 9 °F (36 6 °C)  HR: 85  BP: 127/79  RR: 22  SpO2: 92    Respiratory:  SpO2: SpO2: 97 %, SpO2 Activity: SpO2 Activity: At Rest, SpO2 Device: O2 Device: High flow nasal cannula  Nasal Cannula O2 Flow Rate (L/min): 40 L/min    Invasive/non-invasive ventilation settings   Respiratory    Lab Data (Last 4 hours)    None         O2/Vent Data (Last 4 hours)      02/16 1140          Non-Invasive Ventilation Mode HFNC (High flow)                   Physical Exam  Constitutional:       General: He is not in acute distress  Appearance: Normal appearance  He is normal weight  He is ill-appearing  HENT:      Head: Normocephalic and atraumatic  Right Ear: External ear normal       Left Ear: External ear normal       Nose: Nose normal       Mouth/Throat:      Mouth: Mucous membranes are moist       Pharynx: Oropharynx is clear  Eyes:      Extraocular Movements: Extraocular movements intact  Conjunctiva/sclera: Conjunctivae normal       Pupils: Pupils are equal, round, and reactive to light  Cardiovascular:      Rate and Rhythm: Normal rate  Rhythm irregular  Pulses: Normal pulses  Heart sounds: Normal heart sounds  Pulmonary:      Breath sounds: Normal breath sounds  No wheezing or rhonchi  Comments: Increased effort  Abdominal:      General: Abdomen is flat  Bowel sounds are normal  There is no distension  Palpations: Abdomen is soft  Tenderness: There is no abdominal tenderness  Musculoskeletal:      Right lower leg: No edema  Left lower leg: No edema  Skin:     General: Skin is warm and dry  Capillary Refill: Capillary refill takes less than 2 seconds  Coloration: Skin is not pale  Neurological:      General: No focal deficit present  Mental Status: He is alert and oriented to person, place, and time  Sensory: No sensory deficit  Motor: No weakness     Psychiatric:         Mood and Affect: Mood normal  Behavior: Behavior normal          Laboratory and Diagnostics:  Results from last 7 days   Lab Units 02/16/21  0506 02/15/21  0526 02/14/21  0659 02/13/21  0551 02/11/21  0401 02/10/21  0653   WBC Thousand/uL 14 26* 11 17* 12 30* 13 53* 10 77* 9 67   HEMOGLOBIN g/dL 17 1* 17 2* 15 8 16 4 14 4 14 4   HEMATOCRIT % 53 2* 53 2* 48 8 50 3* 45 2 44 8   PLATELETS Thousands/uL 153 145* 137* 167 300 314   NEUTROS PCT % 95*  --   --  91* 94*  --    BANDS PCT %  --   --  5  --   --  4   MONOS PCT % 2*  --   --  5 3*  --    MONO PCT %  --  0* 4  --   --  1*     Results from last 7 days   Lab Units 02/16/21  0506 02/15/21  1755 02/15/21  0526 02/14/21  0659 02/13/21  0551 02/11/21  0401 02/10/21  0702   SODIUM mmol/L 139 138 139 140 139 140 141   POTASSIUM mmol/L 5 1 5 0 5 9* 5 2 5 0 5 0 5 1   CHLORIDE mmol/L 105 104 105 106 105 107 109*   CO2 mmol/L 29 29 29 26 28 26 24   ANION GAP mmol/L 5 5 5 8 6 7 8   BUN mg/dL 41* 46* 48* 43* 42* 36* 29*   CREATININE mg/dL 1 04 1 24 0 99 0 95 1 01 0 98 0 84   CALCIUM mg/dL 8 5 8 3 8 9 8 3 8 3 8 2* 8 4   GLUCOSE RANDOM mg/dL 184* 181* 128 118 105 138 116   ALT U/L  --   --  46 25  --   --   --    AST U/L  --   --  56* 53*  --   --   --    ALK PHOS U/L  --   --  145* 115  --   --   --    ALBUMIN g/dL  --   --  2 2* 2 1*  --   --   --    TOTAL BILIRUBIN mg/dL  --   --  2 02* 2 58*  --   --   --      Results from last 7 days   Lab Units 02/16/21  0506 02/13/21  0551 02/10/21  0702   MAGNESIUM mg/dL 2 5 2 4 2 4   PHOSPHORUS mg/dL 2 9  --   --       Results from last 7 days   Lab Units 02/12/21  0441 02/11/21  0401 02/10/21  0703   INR  2 83* 2 86* 3 90*              ABG:    VBG:          Micro        EKG: no new  Imaging: no new I have personally reviewed pertinent reports        Intake and Output  I/O       02/14 0701 - 02/15 0700 02/15 0701 - 02/16 0700 02/16 0701 - 02/17 0700    IV Piggyback 126      Total Intake(mL/kg) 126 (1 3)      Urine (mL/kg/hr) 725 (0 3) 1600 (0 7)     Stool 0 Total Output 725 1600     Net -599 -1600            Unmeasured Stool Occurrence 1 x            Height and Weights      IBW: -88 kg  Body mass index is 27 72 kg/m²  Weight (last 2 days)     Date/Time   Weight    02/16/21 0510   95 3 (210 1)    02/16/21 0247   96 2 (212 08)    02/15/21 0531   95 4 (210 32)                Nutrition       Diet Orders   (From admission, onward)             Start     Ordered    02/11/21 1339  Dietary nutrition supplements  Once     Question Answer Comment   Select Supplement: Ensure Enlive-Vanilla    Frequency Breakfast, Dinner        02/11/21 1338    02/05/21 2218  Room Service  Once     Question:  Type of Service  Answer:  Room Service - Appropriate with Assistance    02/05/21 2218 02/05/21 1722  Diet Cardiovascular; Cardiac  Diet effective now     Question Answer Comment   Diet Type Cardiovascular    Cardiac Cardiac    RD to adjust diet per protocol?  Yes        02/05/21 1721                Active Medications  Scheduled Meds:  Current Facility-Administered Medications   Medication Dose Route Frequency Provider Last Rate    atorvastatin  40 mg Oral HS Desmond Renner PA-C      benzonatate  100 mg Oral TID PRN Desmond Renner PA-C      cholecalciferol  2,000 Units Oral Daily Desmond Renner PA-C      dexamethasone  0 1 mg/kg Intravenous Q24H Desmond Renner PA-C      enoxaparin  1 mg/kg Subcutaneous Q12H [facility] Linsey Roe PA-C      famotidine  20 mg Oral BID Desmond Renner PA-C      lactulose  20 g Oral Daily Desmond Renner PA-C      melatonin  10 5 mg Oral HS Desmond Renner PA-C      metoprolol tartrate  12 5 mg Oral Q12H [facility] Desmond Renner PA-C      multivitamin-minerals  1 tablet Oral Daily Desmond Renner PA-C      OLANZapine  5 mg Oral HS Desmond Renner PA-C      ondansetron  4 mg Intravenous Q6H PRN Desmond Renner PA-C      polyethylene glycol  17 g Oral Daily Desmond Renner PA-C      senna-docusate sodium  1 tablet Oral BID Tyra Gary PA-C      tamsulosin  0 4 mg Oral Daily Cathi Roe PA-C       Continuous Infusions:     PRN Meds:   benzonatate, 100 mg, TID PRN  ondansetron, 4 mg, Q6H PRN        Allergies   No Known Allergies  ---------------------------------------------------------------------------------------  Advance Directive and Living Will:      Power of :    POLST:    ---------------------------------------------------------------------------------------  Care Time Delivered:   No Critical Care time spent     Linda Edmondson, DO      Portions of the record may have been created with voice recognition software  Occasional wrong word or "sound a like" substitutions may have occurred due to the inherent limitations of voice recognition software    Read the chart carefully and recognize, using context, where substitutions have occurred

## 2021-02-17 PROBLEM — R63.4 WEIGHT LOSS: Status: ACTIVE | Noted: 2021-01-01

## 2021-02-17 NOTE — PLAN OF CARE
Problem: PAIN - ADULT  Goal: Verbalizes/displays adequate comfort level or baseline comfort level  Description: Interventions:  - Encourage patient to monitor pain and request assistance  - Assess pain using appropriate pain scale  - Administer analgesics based on type and severity of pain and evaluate response  - Implement non-pharmacological measures as appropriate and evaluate response  - Consider cultural and social influences on pain and pain management  - Notify physician/advanced practitioner if interventions unsuccessful or patient reports new pain  Outcome: Progressing     Problem: INFECTION - ADULT  Goal: Absence or prevention of progression during hospitalization  Description: INTERVENTIONS:  - Assess and monitor for signs and symptoms of infection  - Monitor lab/diagnostic results  - Monitor all insertion sites, i e  indwelling lines, tubes, and drains  - Monitor endotracheal if appropriate and nasal secretions for changes in amount and color  - [place] appropriate cooling/warming therapies per order  - Administer medications as ordered  - Instruct and encourage patient and family to use good hand hygiene technique  - Identify and instruct in appropriate isolation precautions for identified infection/condition  Outcome: Progressing     Problem: SAFETY ADULT  Goal: Patient will remain free of falls  Description: INTERVENTIONS:  - Assess patient frequently for physical needs  -  Identify cognitive and physical deficits and behaviors that affect risk of falls    -  [place] fall precautions as indicated by assessment   - Educate patient/family on patient safety including physical limitations  - Instruct patient to call for assistance with activity based on assessment  - Modify environment to reduce risk of injury  - Consider OT/PT consult to assist with strengthening/mobility  Outcome: Progressing  Goal: Maintain or return to baseline ADL function  Description: INTERVENTIONS:  -  Assess patient's ability to carry out ADLs; assess patient's baseline for ADL function and identify physical deficits which impact ability to perform ADLs (bathing, care of mouth/teeth, toileting, grooming, dressing, etc )  - Assess/evaluate cause of self-care deficits   - Assess range of motion  - Assess patient's mobility; develop plan if impaired  - Assess patient's need for assistive devices and provide as appropriate  - Encourage maximum independence but intervene and supervise when necessary  - Involve family in performance of ADLs  - Assess for home care needs following discharge   - Consider OT consult to assist with ADL evaluation and planning for discharge  - Provide patient education as appropriate  Outcome: Progressing  Goal: Maintain or return mobility status to optimal level  Description: INTERVENTIONS:  - Assess patient's baseline mobility status (ambulation, transfers, stairs, etc )    - Identify cognitive and physical deficits and behaviors that affect mobility  - Identify mobility aids required to assist with transfers and/or ambulation (gait belt, sit-to-stand, lift, walker, cane, etc )  - [place] fall precautions as indicated by assessment  - Record patient progress and toleration of activity level on Mobility SBAR; progress patient to next Phase/Stage  - Instruct patient to call for assistance with activity based on assessment  - Consider rehabilitation consult to assist with strengthening/weightbearing, etc   Outcome: Progressing     Problem: DISCHARGE PLANNING  Goal: Discharge to home or other facility with appropriate resources  Description: INTERVENTIONS:  - Identify barriers to discharge w/patient and caregiver  - Arrange for needed discharge resources and transportation as appropriate  - Identify discharge learning needs (meds, wound care, etc )  - Arrange for interpretive services to assist at discharge as needed  - Refer to Case Management Department for coordinating discharge planning if the patient needs post-hospital services based on physician/advanced practitioner order or complex needs related to functional status, cognitive ability, or social support system  Outcome: Progressing     Problem: Knowledge Deficit  Goal: Patient/family/caregiver demonstrates understanding of disease process, treatment plan, medications, and discharge instructions  Description: Complete learning assessment and assess knowledge base  Interventions:  - Provide teaching at level of understanding  - Provide teaching via preferred learning methods  Outcome: Progressing     Problem: Potential for Falls  Goal: Patient will remain free of falls  Description: INTERVENTIONS:  - Assess patient frequently for physical needs  -  Identify cognitive and physical deficits and behaviors that affect risk of falls    -  [place] fall precautions as indicated by assessment   - Educate patient/family on patient safety including physical limitations  - Instruct patient to call for assistance with activity based on assessment  - Modify environment to reduce risk of injury  - Consider OT/PT consult to assist with strengthening/mobility  Outcome: Progressing     Problem: Prexisting or High Potential for Compromised Skin Integrity  Goal: Skin integrity is maintained or improved  Description: INTERVENTIONS:  - Identify patients at risk for skin breakdown  - Assess and monitor skin integrity  - Assess and monitor nutrition and hydration status  - Monitor labs   - Assess for incontinence   - Turn and reposition patient  - Assist with mobility/ambulation  - Relieve pressure over bony prominences  - Avoid friction and shearing  - Provide appropriate hygiene as needed including keeping skin clean and dry  - Evaluate need for skin moisturizer/barrier cream  - Collaborate with interdisciplinary team   - Patient/family teaching  - Consider wound care consult   Outcome: Progressing     Problem: Nutrition/Hydration-ADULT  Goal: Nutrient/Hydration intake appropriate for improving, restoring or maintaining nutritional needs  Description: Monitor and assess patient's nutrition/hydration status for malnutrition  Collaborate with interdisciplinary team and initiate plan and interventions as ordered  Monitor patient's weight and dietary intake as ordered or per policy  Utilize nutrition screening tool and intervene as necessary  Determine patient's food preferences and provide high-protein, high-caloric foods as appropriate       INTERVENTIONS:  - Monitor oral intake, urinary output, labs, and treatment plans  - Assess nutrition and hydration status and recommend course of action  - Evaluate amount of meals eaten  - Assist patient with eating if necessary   - Allow adequate time for meals  - Recommend/ encourage appropriate diets, oral nutritional supplements, and vitamin/mineral supplements  - Order, calculate, and assess calorie counts as needed  - Recommend, monitor, and adjust tube feedings and TPN/PPN based on assessed needs  - Assess need for intravenous fluids  - Provide specific nutrition/hydration education as appropriate  - Include patient/family/caregiver in decisions related to nutrition  Outcome: Progressing     Problem: RESPIRATORY - ADULT  Goal: Achieves optimal ventilation and oxygenation  Description: INTERVENTIONS:  - Assess for changes in respiratory status  - Assess for changes in mentation and behavior  - Position to facilitate oxygenation and minimize respiratory effort  - Oxygen administered by appropriate delivery if ordered  - Initiate smoking cessation education as indicated  - Encourage broncho-pulmonary hygiene including cough, deep breathe, Incentive Spirometry  - Assess the need for suctioning and aspirate as needed  - Assess and instruct to report SOB or any respiratory difficulty  - Respiratory Therapy support as indicated  Outcome: Progressing     Problem: METABOLIC, FLUID AND ELECTROLYTES - ADULT  Goal: Electrolytes maintained within normal limits  Description: INTERVENTIONS:  - Monitor labs and assess patient for signs and symptoms of electrolyte imbalances  - Administer electrolyte replacement as ordered  - Monitor response to electrolyte replacements, including repeat lab results as appropriate  - Instruct patient on fluid and nutrition as appropriate  Outcome: Progressing  Goal: Fluid balance maintained  Description: INTERVENTIONS:  - Monitor labs   - Monitor I/O and WT  - Instruct patient on fluid and nutrition as appropriate  - Assess for signs & symptoms of volume excess or deficit  Outcome: Progressing     Problem: SAFETY,RESTRAINT: NV/NON-SELF DESTRUCTIVE BEHAVIOR  Goal: Remains free of harm/injury (restraint for non violent/non self-detsructive behavior)  Description: INTERVENTIONS:  - Instruct patient/family regarding restraint use   - Assess and monitor physiologic and psychological status   - Provide interventions and comfort measures to meet assessed patient needs   - Identify and implement measures to help patient regain control  - Assess readiness for release of restraint   Outcome: Progressing  Goal: Returns to optimal restraint-free functioning  Description: INTERVENTIONS:  - Assess the patient's behavior and symptoms that indicate continued need for restraint  - Identify and implement measures to help patient regain control  - Assess readiness for release of restraint   Outcome: Progressing

## 2021-02-17 NOTE — QUICK NOTE
I called the patient's son, lenore, and informed him of his father's overnight events and plan for management  I discussed with him our concern with his father's weight loss, and that we consulted nutrition for recommendations  I answered any questions he had and he was very appreciative of the call

## 2021-02-17 NOTE — PROGRESS NOTES
Progress Note - Arthur Sinclair 1945, 70 y o  male MRN: [id number]    Unit/Bed#: ICU 11 Encounter: [id number]    Primary Care Provider: No primary care provider on file  Date and time admitted to hospital: 2/5/2021 11:04 AM        Weight loss  Assessment & Plan  · Total loss of approximately 30Lb since 11/2020  · Suspected 2/2 to poor diet   · Nutrition consult placed, appreciate recommendations    Weakness  Assessment & Plan  · PT/OT consulted  · They will continue to follow inpatient, and they recommend post acute rehab at this time    Leukocytosis  Assessment & Plan  · WBC 14 yesterday, Afebrile, lab holiday today  · Lines: peripheral IVs  · CXR 2/5 - Suspected bilateral Covid pneumonia  · Physical exam unchanged, denies fever/chills/pain  · Likely 2/2 steroids  · Plan  · Continue to monitor am cbc tomorrow     Delirium due to general medical condition  Assessment & Plan  · Started on zyprexa HS with improvement  · EKG qt/qtc: 400/508  · Continue melatonin hs    Chronic atrial fibrillation (HCC)  Assessment & Plan  · Metoprolol 12 5 BID for rate control  · Coumadin on hold on admission for supratherapeutic INR, continue therapeutic lovenox for now per COVID protocol       Acute respiratory failure with hypoxia (HCC)  Assessment & Plan  · Secondary to COVID PNA  · Continue treatment per protocol  · Continue HFNC +/- NRB to maintain SpO2 >88%     * Pneumonia due to COVID-19 virus  Assessment & Plan  · Continue severe pathway treatment protocol  ? On HFNC 65%/30L +/- NRB   ? Antibiotics discontinued due to negative procal x 2  ? Completed course of Remdesivir   ? Continue steroids, Day 12, continue to taper  ? Actemra 2/14  ? Continue lovenox per COVID protocol   ? Encourage self-proning and incentive spirometry  ? Trend CRP, D-Dimer, Ferritin q48hr  ?  Wean O2 to maintain saturations > 88-90%      ----------------------------------------------------------------------------------------  HPI/24hr events: No overnight events    ---------------------------------------------------------------------------------------  SUBJECTIVE  Patient reports he feels okay this morning  He feels his breathing is unchanged, his cough is improved, and he continues to complain of feeling weak  Review of Systems   Constitutional: Negative for appetite change, chills and fever  Respiratory: Positive for shortness of breath  Negative for cough  Cardiovascular: Negative for chest pain, palpitations and leg swelling  Gastrointestinal: Negative for abdominal pain, blood in stool, constipation, diarrhea, nausea and vomiting  Genitourinary: Negative for difficulty urinating, frequency and hematuria  Skin: Negative for rash and wound  Neurological: Positive for weakness  Negative for headaches  Psychiatric/Behavioral: Negative for sleep disturbance  ---------------------------------------------------------------------------------------  Disposition: Continue Stepdown Level 1 level of care possible transfer to [facility] later today  Code Status: Level 1 - Full Code  ---------------------------------------------------------------------------------------  ICU CORE MEASURES    Prophylaxis   VTE Pharmacologic Prophylaxis: Enoxaparin (Lovenox)  VTE Mechanical Prophylaxis: sequential compression device  Stress Ulcer Prophylaxis: Famotidine PO    ABCDE Protocol (if indicated)  Plan to perform spontaneous awakening trial today? Not applicable  Plan to perform spontaneous breathing trial today? Not applicable  Obvious barriers to extubation? Not applicable  CAM-ICU: n/a    Invasive Devices Review  Invasive Devices     Peripheral Intravenous Line            Peripheral IV 02/16/21 Left Arm 1 day              Can any invasive devices be discontinued today?  No  ---------------------------------------------------------------------------------------  OBJECTIVE    Vitals   Vitals:    02/17/21 0000 02/17/21 0309 02/17/21 0349 02/17/21 0709   BP: 125/67 113/81   BP Location:    Right arm   Pulse:   99 75   Resp:   (!) 27 22   Temp:   (!) 97 4 °F (36 3 °C) 97 8 °F (36 6 °C)   TempSrc:   Oral Oral   SpO2: 95% 94% 94% 96%   Weight:         Temp (24hrs), Av 5 °F (36 4 °C), Min:97 °F (36 1 °C), Max:97 8 °F (36 6 °C)  Current: Temperature: 97 8 °F (36 6 °C)  HR: 77  BP: 124/78  RR: 21  SpO2: 95    Respiratory:  SpO2: SpO2: 96 %, SpO2 Activity: SpO2 Activity: At Rest, SpO2 Device: O2 Device: High flow nasal cannula  Nasal Cannula O2 Flow Rate (L/min): 30 L/min    Invasive/non-invasive ventilation settings   Respiratory    Lab Data (Last 4 hours)    None         O2/Vent Data (Last 4 hours)    None                Physical Exam  Constitutional:       General: He is not in acute distress  Appearance: Normal appearance  He is normal weight  He is not ill-appearing  HENT:      Head: Normocephalic and atraumatic  Nose: Nose normal  No congestion  Mouth/Throat:      Mouth: Mucous membranes are moist       Pharynx: Oropharynx is clear  Eyes:      Extraocular Movements: Extraocular movements intact  Conjunctiva/sclera: Conjunctivae normal       Pupils: Pupils are equal, round, and reactive to light  Neck:      Musculoskeletal: Neck supple  Cardiovascular:      Rate and Rhythm: Normal rate  Rhythm irregular  Pulses: Normal pulses  Heart sounds: Normal heart sounds  Pulmonary:      Effort: No respiratory distress  Breath sounds: Normal breath sounds  No wheezing or rhonchi  Abdominal:      General: Abdomen is flat  Bowel sounds are normal  There is no distension  Palpations: Abdomen is soft  Tenderness: There is no abdominal tenderness  Skin:     General: Skin is warm and dry  Capillary Refill: Capillary refill takes less than 2 seconds  Neurological:      Mental Status: He is alert and oriented to person, place, and time  Mental status is at baseline  Motor: No weakness        Comments: Oriented well to person and time, but mildly disoriented to place  Knows we are in a hospital but can never recall which hospital     Psychiatric:         Mood and Affect: Mood normal          Behavior: Behavior normal          Laboratory and Diagnostics:  Results from last 7 days   Lab Units 21  0506 02/15/21  0526 21  0659 21  0551 21  0401   WBC Thousand/uL 14 26* 11 17* 12 30* 13 53* 10 77*   HEMOGLOBIN g/dL 17 1* 17 2* 15 8 16 4 14 4   HEMATOCRIT % 53 2* 53 2* 48 8 50 3* 45 2   PLATELETS Thousands/uL 153 145* 137* 167 300   NEUTROS PCT % 95*  --   --  91* 94*   BANDS PCT %  --   --  5  --   --    MONOS PCT % 2*  --   --  5 3*   MONO PCT %  --  0* 4  --   --      Results from last 7 days   Lab Units 21  0506 02/15/21  1755 02/15/21  0526 21  0659 21  0551 21  0401   SODIUM mmol/L 139 138 139 140 139 140   POTASSIUM mmol/L 5 1 5 0 5 9* 5 2 5 0 5 0   CHLORIDE mmol/L 105 104 105 106 105 107   CO2 mmol/L 29 29 29 26 28 26   ANION GAP mmol/L 5 5 5 8 6 7   BUN mg/dL 41* 46* 48* 43* 42* 36*   CREATININE mg/dL 1 04 1 24 0 99 0 95 1 01 0 98   CALCIUM mg/dL 8 5 8 3 8 9 8 3 8 3 8 2*   GLUCOSE RANDOM mg/dL 184* 181* 128 118 105 138   ALT U/L  --   --  46 25  --   --    AST U/L  --   --  56* 53*  --   --    ALK PHOS U/L  --   --  145* 115  --   --    ALBUMIN g/dL  --   --  2 2* 2 1*  --   --    TOTAL BILIRUBIN mg/dL  --   --  2 02* 2 58*  --   --      Results from last 7 days   Lab Units 21  0506 21  0551   MAGNESIUM mg/dL 2 5 2 4   PHOSPHORUS mg/dL 2 9  --       Results from last 7 days   Lab Units 21  0441 21  0401   INR  2 83* 2 86*              ABG:    VBG:          Micro        EK/16   Atrial fibrillation  Anteroseptal infarct (cited on or before 15-FEB-2021)  Prolonged QT  Abnormal ECG  When compared with ECG of 15-FEB-2021 06:27,  No significant change since prior tracing  Imaging: no new I have personally reviewed pertinent reports        Intake and Output  I/O       02/15 0701 - 02/16 0700 02/16 0701 - 02/17 0700    P  O   240    Total Intake(mL/kg)  240 (2 5)    Urine (mL/kg/hr) 1850 (0 8) 1000 (0 4)    Stool  0    Total Output 1850 1000    Net -1850 -760          Unmeasured Urine Occurrence  3 x    Unmeasured Stool Occurrence  1 x          Height and Weights      IBW: -88 kg  Body mass index is 27 72 kg/m²  Weight (last 2 days)     Date/Time   Weight    02/16/21 0510   95 3 (210 1)    02/16/21 0247   96 2 (212 08)    02/15/21 0531   95 4 (210 32)                Nutrition       Diet Orders   (From admission, onward)             Start     Ordered    02/11/21 1339  Dietary nutrition supplements  Once     Question Answer Comment   Select Supplement: Ensure Enlive-Vanilla    Frequency Breakfast, Dinner        02/11/21 1338    02/05/21 2218  Room Service  Once     Question:  Type of Service  Answer:  Room Service - Appropriate with Assistance    02/05/21 2218 02/05/21 1722  Diet Cardiovascular; Cardiac  Diet effective now     Question Answer Comment   Diet Type Cardiovascular    Cardiac Cardiac    RD to adjust diet per protocol?  Yes        02/05/21 1721                Active Medications  Scheduled Meds:  Current Facility-Administered Medications   Medication Dose Route Frequency Provider Last Rate    atorvastatin  40 mg Oral HS [facility] JOAQUIN Silvestre-VICKEY      benzonatate  100 mg Oral TID PRN [facility] JOAQUIN Silvestre-C      cholecalciferol  2,000 Units Oral Daily [place] Silvestre PA-C      dexamethasone  0 1 mg/kg Intravenous Q24H [place] Germain-VICKEY      enoxaparin  1 mg/kg Subcutaneous Q12H [address] [place] Tom[place]ma Roe PA-C      famotidine  20 mg Oral BID [facility] KATHIE Silvestre      melatonin  10 5 mg Oral HS [facility] JOAQUIN Silvestre-VICKEY      metoprolol tartrate  12 5 mg Oral Q12H [address] [facility] JOAQUIN Silvestre-VICKEY      multivitamin-minerals  1 tablet Oral Daily JOAQUIN Quinn-VICKEY      OLANZapine  5 mg Oral HS Sada Roe PA-C      ondansetron 4 mg Intravenous Q6H PRN Prabhu Youssef PA-C      polyethylene glycol  17 g Oral Daily Prabhu Youssef PA-C      senna-docusate sodium  1 tablet Oral BID Prabhu Youssef PA-C      tamsulosin  0 4 mg Oral Daily Cathi Roe PA-C       Continuous Infusions:     PRN Meds:   benzonatate, 100 mg, TID PRN  ondansetron, 4 mg, Q6H PRN        Allergies   No Known Allergies  ---------------------------------------------------------------------------------------  Advance Directive and Living Will:      Power of :    POLST:    ---------------------------------------------------------------------------------------  Care Time Delivered:   No Critical Care time spent     Avril Artis DO      Portions of the record may have been created with voice recognition software  Occasional wrong word or "sound a like" substitutions may have occurred due to the inherent limitations of voice recognition software    Read the chart carefully and recognize, using context, where substitutions have occurred

## 2021-02-17 NOTE — ASSESSMENT & PLAN NOTE
· WBC 11 51  · Lines: peripheral IVs  · CXR 2/5 - Suspected bilateral Covid pneumonia  · Physical exam unchanged, denies fever/chills/pain  · Likely 2/2 steroids  · Plan  · Continue to monitor am cbc tomorrow

## 2021-02-17 NOTE — PLAN OF CARE
Problem: PAIN - ADULT  Goal: Verbalizes/displays adequate comfort level or baseline comfort level  Description: Interventions:  - Encourage patient to monitor pain and request assistance  - Assess pain using appropriate pain scale  - Administer analgesics based on type and severity of pain and evaluate response  - Implement non-pharmacological measures as appropriate and evaluate response  - Consider cultural and social influences on pain and pain management  - Notify physician/advanced practitioner if interventions unsuccessful or patient reports new pain  Outcome: Progressing     Problem: INFECTION - ADULT  Goal: Absence or prevention of progression during hospitalization  Description: INTERVENTIONS:  - Assess and monitor for signs and symptoms of infection  - Monitor lab/diagnostic results  - Monitor all insertion sites, i e  indwelling lines, tubes, and drains  - Monitor endotracheal if appropriate and nasal secretions for changes in amount and color  - [place] appropriate cooling/warming therapies per order  - Administer medications as ordered  - Instruct and encourage patient and family to use good hand hygiene technique  - Identify and instruct in appropriate isolation precautions for identified infection/condition  Outcome: Progressing     Problem: SAFETY ADULT  Goal: Patient will remain free of falls  Description: INTERVENTIONS:  - Assess patient frequently for physical needs  -  Identify cognitive and physical deficits and behaviors that affect risk of falls    -  [place] fall precautions as indicated by assessment   - Educate patient/family on patient safety including physical limitations  - Instruct patient to call for assistance with activity based on assessment  - Modify environment to reduce risk of injury  - Consider OT/PT consult to assist with strengthening/mobility  Outcome: Progressing  Goal: Maintain or return to baseline ADL function  Description: INTERVENTIONS:  -  Assess patient's ability to carry out ADLs; assess patient's baseline for ADL function and identify physical deficits which impact ability to perform ADLs (bathing, care of mouth/teeth, toileting, grooming, dressing, etc )  - Assess/evaluate cause of self-care deficits   - Assess range of motion  - Assess patient's mobility; develop plan if impaired  - Assess patient's need for assistive devices and provide as appropriate  - Encourage maximum independence but intervene and supervise when necessary  - Involve family in performance of ADLs  - Assess for home care needs following discharge   - Consider OT consult to assist with ADL evaluation and planning for discharge  - Provide patient education as appropriate  Outcome: Progressing  Goal: Maintain or return mobility status to optimal level  Description: INTERVENTIONS:  - Assess patient's baseline mobility status (ambulation, transfers, stairs, etc )    - Identify cognitive and physical deficits and behaviors that affect mobility  - Identify mobility aids required to assist with transfers and/or ambulation (gait belt, sit-to-stand, lift, walker, cane, etc )  - [place] fall precautions as indicated by assessment  - Record patient progress and toleration of activity level on Mobility SBAR; progress patient to next Phase/Stage  - Instruct patient to call for assistance with activity based on assessment  - Consider rehabilitation consult to assist with strengthening/weightbearing, etc   Outcome: Progressing     Problem: DISCHARGE PLANNING  Goal: Discharge to home or other facility with appropriate resources  Description: INTERVENTIONS:  - Identify barriers to discharge w/patient and caregiver  - Arrange for needed discharge resources and transportation as appropriate  - Identify discharge learning needs (meds, wound care, etc )  - Arrange for interpretive services to assist at discharge as needed  - Refer to Case Management Department for coordinating discharge planning if the patient needs post-hospital services based on physician/advanced practitioner order or complex needs related to functional status, cognitive ability, or social support system  Outcome: Progressing     Problem: Knowledge Deficit  Goal: Patient/family/caregiver demonstrates understanding of disease process, treatment plan, medications, and discharge instructions  Description: Complete learning assessment and assess knowledge base  Interventions:  - Provide teaching at level of understanding  - Provide teaching via preferred learning methods  Outcome: Progressing     Problem: Potential for Falls  Goal: Patient will remain free of falls  Description: INTERVENTIONS:  - Assess patient frequently for physical needs  -  Identify cognitive and physical deficits and behaviors that affect risk of falls    -  [place] fall precautions as indicated by assessment   - Educate patient/family on patient safety including physical limitations  - Instruct patient to call for assistance with activity based on assessment  - Modify environment to reduce risk of injury  - Consider OT/PT consult to assist with strengthening/mobility  Outcome: Progressing     Problem: Prexisting or High Potential for Compromised Skin Integrity  Goal: Skin integrity is maintained or improved  Description: INTERVENTIONS:  - Identify patients at risk for skin breakdown  - Assess and monitor skin integrity  - Assess and monitor nutrition and hydration status  - Monitor labs   - Assess for incontinence   - Turn and reposition patient  - Assist with mobility/ambulation  - Relieve pressure over bony prominences  - Avoid friction and shearing  - Provide appropriate hygiene as needed including keeping skin clean and dry  - Evaluate need for skin moisturizer/barrier cream  - Collaborate with interdisciplinary team   - Patient/family teaching  - Consider wound care consult   Outcome: Progressing     Problem: Nutrition/Hydration-ADULT  Goal: Nutrient/Hydration intake appropriate for improving, restoring or maintaining nutritional needs  Description: Monitor and assess patient's nutrition/hydration status for malnutrition  Collaborate with interdisciplinary team and initiate plan and interventions as ordered  Monitor patient's weight and dietary intake as ordered or per policy  Utilize nutrition screening tool and intervene as necessary  Determine patient's food preferences and provide high-protein, high-caloric foods as appropriate       INTERVENTIONS:  - Monitor oral intake, urinary output, labs, and treatment plans  - Assess nutrition and hydration status and recommend course of action  - Evaluate amount of meals eaten  - Assist patient with eating if necessary   - Allow adequate time for meals  - Recommend/ encourage appropriate diets, oral nutritional supplements, and vitamin/mineral supplements  - Order, calculate, and assess calorie counts as needed  - Recommend, monitor, and adjust tube feedings and TPN/PPN based on assessed needs  - Assess need for intravenous fluids  - Provide specific nutrition/hydration education as appropriate  - Include patient/family/caregiver in decisions related to nutrition  Outcome: Progressing     Problem: RESPIRATORY - ADULT  Goal: Achieves optimal ventilation and oxygenation  Description: INTERVENTIONS:  - Assess for changes in respiratory status  - Assess for changes in mentation and behavior  - Position to facilitate oxygenation and minimize respiratory effort  - Oxygen administered by appropriate delivery if ordered  - Initiate smoking cessation education as indicated  - Encourage broncho-pulmonary hygiene including cough, deep breathe, Incentive Spirometry  - Assess the need for suctioning and aspirate as needed  - Assess and instruct to report SOB or any respiratory difficulty  - Respiratory Therapy support as indicated  Outcome: Progressing     Problem: METABOLIC, FLUID AND ELECTROLYTES - ADULT  Goal: Electrolytes maintained within normal limits  Description: INTERVENTIONS:  - Monitor labs and assess patient for signs and symptoms of electrolyte imbalances  - Administer electrolyte replacement as ordered  - Monitor response to electrolyte replacements, including repeat lab results as appropriate  - Instruct patient on fluid and nutrition as appropriate  Outcome: Progressing  Goal: Fluid balance maintained  Description: INTERVENTIONS:  - Monitor labs   - Monitor I/O and WT  - Instruct patient on fluid and nutrition as appropriate  - Assess for signs & symptoms of volume excess or deficit  Outcome: Progressing     Problem: SAFETY,RESTRAINT: NV/NON-SELF DESTRUCTIVE BEHAVIOR  Goal: Remains free of harm/injury (restraint for non violent/non self-detsructive behavior)  Description: INTERVENTIONS:  - Instruct patient/family regarding restraint use   - Assess and monitor physiologic and psychological status   - Provide interventions and comfort measures to meet assessed patient needs   - Identify and implement measures to help patient regain control  - Assess readiness for release of restraint   Outcome: Progressing  Goal: Returns to optimal restraint-free functioning  Description: INTERVENTIONS:  - Assess the patient's behavior and symptoms that indicate continued need for restraint  - Identify and implement measures to help patient regain control  - Assess readiness for release of restraint   Outcome: Progressing

## 2021-02-17 NOTE — MALNUTRITION/BMI
This medical record reflects one or more clinical indicators suggestive of malnutrition and/or morbid obesity  Malnutrition Findings:   Adult Malnutrition type: Acute illness  Adult Degree of Malnutrition: Malnutrition of moderate degree(related to acute medical condition, inadequate energy intake)  Malnutrition Characteristics: Inadequate energy, Weight loss(as evidenced by 8 3% wt loss x 3 months (11/5/20 242 lb, 2/8/21 222 lb), intake inadequate to consistently meet estimated energy needs >7 days based on documentation  Treatment - diet and supplement )    See Nutrition note dated 2/17/21 for additional details  Completed nutrition assessment is viewable in the nutrition documentation

## 2021-02-17 NOTE — ASSESSMENT & PLAN NOTE
· Metoprolol 25mg BID for rate control  · Coumadin on hold on admission for supratherapeutic INR, continue therapeutic lovenox for now per COVID protocol

## 2021-02-17 NOTE — ASSESSMENT & PLAN NOTE
· Total loss of approximately 30Lb since 11/2020  · Suspected 2/2 to poor diet   · Nutrition consult placed, appreciate recommendations  · Will need f/u outpatient with PCP for further evaluation

## 2021-02-17 NOTE — ASSESSMENT & PLAN NOTE
· Continue severe pathway treatment protocol  ? On HFNC 60%/30L +/- NRB, will increase FIO2 again and try to stay off NRB  ? Antibiotics discontinued due to negative procal x 2  ? Completed course of Remdesivir   ? Continue steroids, Day 13, continue to taper  ? Actemra 2/14  ? Continue lovenox per COVID protocol   ? Encourage self-proning and incentive spirometry  ? Trend CRP, D-Dimer, Ferritin q48hr  ?  Wean O2 to maintain saturations > 88-90%

## 2021-02-18 PROBLEM — E78.5 HYPERLIPIDEMIA: Status: ACTIVE | Noted: 2021-01-01

## 2021-02-18 PROBLEM — E44.0 MODERATE PROTEIN-CALORIE MALNUTRITION (HCC): Status: ACTIVE | Noted: 2021-01-01

## 2021-02-18 NOTE — QUICK NOTE
I called and spoke with the patient's son Melany Bundy  I updated him on overnight events and informed him his respiratory status was a little worse today  I updated him on the plan and answered any questions  He was very appreciative of the call

## 2021-02-18 NOTE — PROGRESS NOTES
Progress Note - Merline Furry 1945, 70 y o  male MRN: [id number]    Unit/Bed#: ICU 11 Encounter: [id number]    Primary Care Provider: No primary care provider on file  Date and time admitted to hospital: 2/5/2021 11:04 AM        Hyperlipidemia  Assessment & Plan  · Continue statin    Moderate protein-calorie malnutrition (HCC)  Assessment & Plan  Malnutrition Findings:   Adult Malnutrition type: Acute illness  Adult Degree of Malnutrition: Malnutrition of moderate degree(related to acute medical condition, inadequate energy intake)    BMI Findings: Body mass index is 27 72 kg/m²  · Continue with diet recommendations per nutrition    Weight loss  Assessment & Plan  · Total loss of approximately 30Lb since 11/2020  · Suspected 2/2 to poor diet   · Nutrition consult placed, appreciate recommendations  · Will need f/u outpatient with PCP for further evaluation    Weakness  Assessment & Plan  · PT/OT consulted  · They will continue to follow inpatient, and they recommend post acute rehab at this time    Leukocytosis  Assessment & Plan  · WBC 11 51  · Lines: peripheral IVs  · CXR 2/5 - Suspected bilateral Covid pneumonia  · Physical exam unchanged, denies fever/chills/pain  · Likely 2/2 steroids  · Plan  · Continue to monitor am cbc tomorrow     Delirium due to general medical condition  Assessment & Plan  · Started on zyprexa 2 5mg HS with improvement  · Continue melatonin hs    Chronic atrial fibrillation (HCC)  Assessment & Plan  · Metoprolol 25mg BID for rate control  · Coumadin on hold on admission for supratherapeutic INR, continue therapeutic lovenox for now per COVID protocol       Acute respiratory failure with hypoxia ([facility] [address] )  Assessment & Plan  · Secondary to COVID PNA  · Continue treatment per protocol  · Continue HFNC +/- NRB to maintain SpO2 >88%     * Pneumonia due to COVID-19 virus  Assessment & Plan  · Continue severe pathway treatment protocol  ?  On HFNC 60%/30L +/- NRB, will increase FIO2 again and try to stay off NRB  ? Antibiotics discontinued due to negative procal x 2  ? Completed course of Remdesivir   ? Continue steroids, Day 13, continue to taper  ? Actemra 2/14  ? Continue lovenox per COVID protocol   ? Encourage self-proning and incentive spirometry  ? Trend CRP, D-Dimer, Ferritin q48hr  ? Wean O2 to maintain saturations > 88-90%    ----------------------------------------------------------------------------------------  HPI/24hr events: HFNC now on 60%/30L off NRB, no overnight events    ---------------------------------------------------------------------------------------  SUBJECTIVE  He reports feeling more tired and weak today  Reports his breathing is unchanged  Review of Systems   Constitutional: Positive for appetite change (decreased) and fatigue  Negative for chills and fever  Respiratory: Positive for shortness of breath  Negative for cough  Cardiovascular: Negative for chest pain, palpitations and leg swelling  Gastrointestinal: Negative for abdominal pain, blood in stool, constipation, diarrhea, nausea and vomiting  Neurological: Positive for weakness  Review of systems was reviewed and negative unless stated above in HPI/24-hour events   ---------------------------------------------------------------------------------------  Disposition: Continue Stepdown Level 1 level of care   Code Status: Level 1 - Full Code  ---------------------------------------------------------------------------------------  ICU CORE MEASURES    Prophylaxis   VTE Pharmacologic Prophylaxis: Enoxaparin (Lovenox)  VTE Mechanical Prophylaxis: sequential compression device  Stress Ulcer Prophylaxis: Famotidine PO    ABCDE Protocol (if indicated)  Plan to perform spontaneous awakening trial today? Not applicable  Plan to perform spontaneous breathing trial today? Not applicable  Obvious barriers to extubation?  Not applicable  CAM-ICU: n/a    Invasive Devices Review  Invasive Devices Peripheral Intravenous Line            Peripheral IV 21 Left Arm 2 days              Can any invasive devices be discontinued today? No  ---------------------------------------------------------------------------------------  OBJECTIVE    Vitals   Vitals:    21 2325 21 0259 21 0700 21 0759   BP:  120/84 92/63    BP Location:  Right arm Right arm    Pulse:  71 79    Resp:  17 16    Temp:  98 °F (36 7 °C) 97 7 °F (36 5 °C)    TempSrc:  Axillary Oral    SpO2: 97% 92% 93% 90%   Weight:         Temp (24hrs), Av 7 °F (36 5 °C), Min:97 6 °F (36 4 °C), Max:98 °F (36 7 °C)  Current: Temperature: 97 7 °F (36 5 °C)  HR: 79  BP: 92/63  RR: 31  SpO2: 85% on HFNC 60%/30L -NRB, 91%with NRB    Respiratory:  SpO2: SpO2: 90 %, SpO2 Activity: SpO2 Activity: At Rest, SpO2 Device: O2 Device: High flow nasal cannula  Nasal Cannula O2 Flow Rate (L/min): 30 L/min    Invasive/non-invasive ventilation settings   Respiratory    Lab Data (Last 4 hours)    None         O2/Vent Data (Last 4 hours)       075          Non-Invasive Ventilation Mode HFNC (High flow)                   Physical Exam  Constitutional:       Comments: Appears more fatigued today, lying in bed, dozing off while talking with me  HENT:      Head: Normocephalic and atraumatic  Nose: Nose normal  No congestion  Mouth/Throat:      Mouth: Mucous membranes are moist       Pharynx: Oropharynx is clear  Eyes:      Conjunctiva/sclera: Conjunctivae normal       Pupils: Pupils are equal, round, and reactive to light  Cardiovascular:      Rate and Rhythm: Normal rate  Rhythm irregular  Pulses: Normal pulses  Heart sounds: Normal heart sounds  No murmur  Pulmonary:      Effort: No respiratory distress  Breath sounds: Normal breath sounds  No wheezing or rhonchi  Abdominal:      General: Abdomen is flat  Bowel sounds are normal  There is no distension  Palpations: Abdomen is soft  Tenderness:  There is no abdominal tenderness  Musculoskeletal:      Right lower leg: No edema  Left lower leg: No edema  Skin:     General: Skin is warm and dry  Capillary Refill: Capillary refill takes less than 2 seconds  Findings: No rash  Neurological:      Mental Status: He is alert and oriented to person, place, and time  Comments: Alert and oriented x3    Psychiatric:         Mood and Affect: Mood normal          Laboratory and Diagnostics:  Results from last 7 days   Lab Units 02/18/21  0458 02/16/21  0506 02/15/21  0526 02/14/21  0659 02/13/21  0551   WBC Thousand/uL 11 51* 14 26* 11 17* 12 30* 13 53*   HEMOGLOBIN g/dL 17 0 17 1* 17 2* 15 8 16 4   HEMATOCRIT % 52 7* 53 2* 53 2* 48 8 50 3*   PLATELETS Thousands/uL 135* 153 145* 137* 167   NEUTROS PCT % 89* 95*  --   --  91*   BANDS PCT %  --   --   --  5  --    MONOS PCT % 3* 2*  --   --  5   MONO PCT %  --   --  0* 4  --      Results from last 7 days   Lab Units 02/18/21  0458 02/16/21  0506 02/15/21  1755 02/15/21  0526 02/14/21  0659 02/13/21  0551   SODIUM mmol/L 138 139 138 139 140 139   POTASSIUM mmol/L 5 0 5 1 5 0 5 9* 5 2 5 0   CHLORIDE mmol/L 104 105 104 105 106 105   CO2 mmol/L 31 29 29 29 26 28   ANION GAP mmol/L 3* 5 5 5 8 6   BUN mg/dL 41* 41* 46* 48* 43* 42*   CREATININE mg/dL 0 89 1 04 1 24 0 99 0 95 1 01   CALCIUM mg/dL 9 0 8 5 8 3 8 9 8 3 8 3   GLUCOSE RANDOM mg/dL 96 184* 181* 128 118 105   ALT U/L  --   --   --  46 25  --    AST U/L  --   --   --  56* 53*  --    ALK PHOS U/L  --   --   --  145* 115  --    ALBUMIN g/dL  --   --   --  2 2* 2 1*  --    TOTAL BILIRUBIN mg/dL  --   --   --  2 02* 2 58*  --      Results from last 7 days   Lab Units 02/16/21  0506 02/13/21  0551   MAGNESIUM mg/dL 2 5 2 4   PHOSPHORUS mg/dL 2 9  --       Results from last 7 days   Lab Units 02/12/21  0441   INR  2 83*              ABG:    VBG:          Micro        EKG: no new  Imaging: no new I have personally reviewed pertinent reports        Intake and Output  I/O       02/16 0701 - 02/17 0700 02/17 0701 - 02/18 0700 02/18 0701 - 02/19 0700    P  O  240 820     Total Intake(mL/kg) 240 (2 5) 820 (8 6)     Urine (mL/kg/hr) 1000 (0 4) 1525 (0 7)     Stool 0 0     Total Output 1000 1525     Net -760 -705            Unmeasured Urine Occurrence 3 x      Unmeasured Stool Occurrence 1 x 0 x           Height and Weights      IBW: -88 kg  Body mass index is 27 72 kg/m²  Weight (last 2 days)     Date/Time   Weight    02/16/21 0510   95 3 (210 1)    02/16/21 0247   96 2 (212 08)                Nutrition       Diet Orders   (From admission, onward)             Start     Ordered    02/17/21 1522  Dietary nutrition supplements  Once     Question Answer Comment   Select Supplement: Magic Cup Cb    Frequency Lunch        02/17/21 1521    02/11/21 1339  Dietary nutrition supplements  Once     Question Answer Comment   Select Supplement: Ensure Enlive-Vanilla    Frequency Breakfast, Dinner        02/11/21 1338    02/05/21 2218  Room Service  Once     Question:  Type of Service  Answer:  Room Service - Appropriate with Assistance    02/05/21 2218    02/05/21 1722  Diet Cardiovascular; Cardiac  Diet effective now     Question Answer Comment   Diet Type Cardiovascular    Cardiac Cardiac    RD to adjust diet per protocol?  Yes        02/05/21 1721                Active Medications  Scheduled Meds:  Current Facility-Administered Medications   Medication Dose Route Frequency Provider Last Rate    atorvastatin  40 mg Oral HS Zahira Richardson PA-C      benzonatate  100 mg Oral TID PRN Zahira Richardson PA-C      cholecalciferol  2,000 Units Oral Daily Zahira Richardson PA-C      dexamethasone  0 1 mg/kg Intravenous Q24H Zahira Richardson PA-C      enoxaparin  1 mg/kg Subcutaneous Q12H [address] Siobhan Roe PA-C      famotidine  20 mg Oral BID Zahira Richardson PA-C      melatonin  10 5 mg Oral HS Siobhan Roe PA-C      metoprolol tartrate  25 mg Oral Q12H [address] John Roy MD      multivitamin-minerals  1 tablet Oral Daily Flemingtonjon Roe PA-C      OLANZapine  2 5 mg Oral HS John Roy MD      ondansetron  4 mg Intravenous Q6H PRN Maranda Cohn PA-C      polyethylene glycol  17 g Oral Daily PRN John Roy MD      senna-docusate sodium  1 tablet Oral BID Maranda Cohn PA-C      tamsulosin  0 4 mg Oral Daily Cathi HERMELINDO Roe PA-C       Continuous Infusions:     PRN Meds:   benzonatate, 100 mg, TID PRN  ondansetron, 4 mg, Q6H PRN  polyethylene glycol, 17 g, Daily PRN        Allergies   No Known Allergies  ---------------------------------------------------------------------------------------  Advance Directive and Living Will:      Power of :    POLST:    ---------------------------------------------------------------------------------------  Care Time Delivered:   No Critical Care time spent     Jenny De La O DO      Portions of the record may have been created with voice recognition software  Occasional wrong word or "sound a like" substitutions may have occurred due to the inherent limitations of voice recognition software    Read the chart carefully and recognize, using context, where substitutions have occurred

## 2021-02-18 NOTE — ASSESSMENT & PLAN NOTE
Malnutrition Findings:   Adult Malnutrition type: Acute illness  Adult Degree of Malnutrition: Malnutrition of moderate degree(related to acute medical condition, inadequate energy intake)    BMI Findings: Body mass index is 27 72 kg/m²     · Continue with diet recommendations per nutrition

## 2021-02-18 NOTE — PLAN OF CARE
Problem: PAIN - ADULT  Goal: Verbalizes/displays adequate comfort level or baseline comfort level  Description: Interventions:  - Encourage patient to monitor pain and request assistance  - Assess pain using appropriate pain scale  - Administer analgesics based on type and severity of pain and evaluate response  - Implement non-pharmacological measures as appropriate and evaluate response  - Consider cultural and social influences on pain and pain management  - Notify physician/advanced practitioner if interventions unsuccessful or patient reports new pain  Outcome: Progressing     Problem: INFECTION - ADULT  Goal: Absence or prevention of progression during hospitalization  Description: INTERVENTIONS:  - Assess and monitor for signs and symptoms of infection  - Monitor lab/diagnostic results  - Monitor all insertion sites, i e  indwelling lines, tubes, and drains  - Monitor endotracheal if appropriate and nasal secretions for changes in amount and color  - [place] appropriate cooling/warming therapies per order  - Administer medications as ordered  - Instruct and encourage patient and family to use good hand hygiene technique  - Identify and instruct in appropriate isolation precautions for identified infection/condition  Outcome: Progressing     Problem: SAFETY ADULT  Goal: Patient will remain free of falls  Description: INTERVENTIONS:  - Assess patient frequently for physical needs  -  Identify cognitive and physical deficits and behaviors that affect risk of falls    -  [place] fall precautions as indicated by assessment   - Educate patient/family on patient safety including physical limitations  - Instruct patient to call for assistance with activity based on assessment  - Modify environment to reduce risk of injury  - Consider OT/PT consult to assist with strengthening/mobility  Outcome: Progressing  Goal: Maintain or return to baseline ADL function  Description: INTERVENTIONS:  -  Assess patient's ability to carry out ADLs; assess patient's baseline for ADL function and identify physical deficits which impact ability to perform ADLs (bathing, care of mouth/teeth, toileting, grooming, dressing, etc )  - Assess/evaluate cause of self-care deficits   - Assess range of motion  - Assess patient's mobility; develop plan if impaired  - Assess patient's need for assistive devices and provide as appropriate  - Encourage maximum independence but intervene and supervise when necessary  - Involve family in performance of ADLs  - Assess for home care needs following discharge   - Consider OT consult to assist with ADL evaluation and planning for discharge  - Provide patient education as appropriate  Outcome: Progressing  Goal: Maintain or return mobility status to optimal level  Description: INTERVENTIONS:  - Assess patient's baseline mobility status (ambulation, transfers, stairs, etc )    - Identify cognitive and physical deficits and behaviors that affect mobility  - Identify mobility aids required to assist with transfers and/or ambulation (gait belt, sit-to-stand, lift, walker, cane, etc )  - [place] fall precautions as indicated by assessment  - Record patient progress and toleration of activity level on Mobility SBAR; progress patient to next Phase/Stage  - Instruct patient to call for assistance with activity based on assessment  - Consider rehabilitation consult to assist with strengthening/weightbearing, etc   Outcome: Progressing     Problem: DISCHARGE PLANNING  Goal: Discharge to home or other facility with appropriate resources  Description: INTERVENTIONS:  - Identify barriers to discharge w/patient and caregiver  - Arrange for needed discharge resources and transportation as appropriate  - Identify discharge learning needs (meds, wound care, etc )  - Arrange for interpretive services to assist at discharge as needed  - Refer to Case Management Department for coordinating discharge planning if the patient needs post-hospital services based on physician/advanced practitioner order or complex needs related to functional status, cognitive ability, or social support system  Outcome: Progressing     Problem: Knowledge Deficit  Goal: Patient/family/caregiver demonstrates understanding of disease process, treatment plan, medications, and discharge instructions  Description: Complete learning assessment and assess knowledge base  Interventions:  - Provide teaching at level of understanding  - Provide teaching via preferred learning methods  Outcome: Progressing     Problem: Potential for Falls  Goal: Patient will remain free of falls  Description: INTERVENTIONS:  - Assess patient frequently for physical needs  -  Identify cognitive and physical deficits and behaviors that affect risk of falls    -  [place] fall precautions as indicated by assessment   - Educate patient/family on patient safety including physical limitations  - Instruct patient to call for assistance with activity based on assessment  - Modify environment to reduce risk of injury  - Consider OT/PT consult to assist with strengthening/mobility  Outcome: Progressing     Problem: Prexisting or High Potential for Compromised Skin Integrity  Goal: Skin integrity is maintained or improved  Description: INTERVENTIONS:  - Identify patients at risk for skin breakdown  - Assess and monitor skin integrity  - Assess and monitor nutrition and hydration status  - Monitor labs   - Assess for incontinence   - Turn and reposition patient  - Assist with mobility/ambulation  - Relieve pressure over bony prominences  - Avoid friction and shearing  - Provide appropriate hygiene as needed including keeping skin clean and dry  - Evaluate need for skin moisturizer/barrier cream  - Collaborate with interdisciplinary team   - Patient/family teaching  - Consider wound care consult   Outcome: Progressing     Problem: Nutrition/Hydration-ADULT  Goal: Nutrient/Hydration intake appropriate for improving, restoring or maintaining nutritional needs  Description: Monitor and assess patient's nutrition/hydration status for malnutrition  Collaborate with interdisciplinary team and initiate plan and interventions as ordered  Monitor patient's weight and dietary intake as ordered or per policy  Utilize nutrition screening tool and intervene as necessary  Determine patient's food preferences and provide high-protein, high-caloric foods as appropriate       INTERVENTIONS:  - Monitor oral intake, urinary output, labs, and treatment plans  - Assess nutrition and hydration status and recommend course of action  - Evaluate amount of meals eaten  - Assist patient with eating if necessary   - Allow adequate time for meals  - Recommend/ encourage appropriate diets, oral nutritional supplements, and vitamin/mineral supplements  - Order, calculate, and assess calorie counts as needed  - Recommend, monitor, and adjust tube feedings and TPN/PPN based on assessed needs  - Assess need for intravenous fluids  - Provide specific nutrition/hydration education as appropriate  - Include patient/family/caregiver in decisions related to nutrition  Outcome: Progressing     Problem: RESPIRATORY - ADULT  Goal: Achieves optimal ventilation and oxygenation  Description: INTERVENTIONS:  - Assess for changes in respiratory status  - Assess for changes in mentation and behavior  - Position to facilitate oxygenation and minimize respiratory effort  - Oxygen administered by appropriate delivery if ordered  - Initiate smoking cessation education as indicated  - Encourage broncho-pulmonary hygiene including cough, deep breathe, Incentive Spirometry  - Assess the need for suctioning and aspirate as needed  - Assess and instruct to report SOB or any respiratory difficulty  - Respiratory Therapy support as indicated  Outcome: Progressing     Problem: METABOLIC, FLUID AND ELECTROLYTES - ADULT  Goal: Electrolytes maintained within normal limits  Description: INTERVENTIONS:  - Monitor labs and assess patient for signs and symptoms of electrolyte imbalances  - Administer electrolyte replacement as ordered  - Monitor response to electrolyte replacements, including repeat lab results as appropriate  - Instruct patient on fluid and nutrition as appropriate  Outcome: Progressing  Goal: Fluid balance maintained  Description: INTERVENTIONS:  - Monitor labs   - Monitor I/O and WT  - Instruct patient on fluid and nutrition as appropriate  - Assess for signs & symptoms of volume excess or deficit  Outcome: Progressing     Problem: SAFETY,RESTRAINT: NV/NON-SELF DESTRUCTIVE BEHAVIOR  Goal: Remains free of harm/injury (restraint for non violent/non self-detsructive behavior)  Description: INTERVENTIONS:  - Instruct patient/family regarding restraint use   - Assess and monitor physiologic and psychological status   - Provide interventions and comfort measures to meet assessed patient needs   - Identify and implement measures to help patient regain control  - Assess readiness for release of restraint   Outcome: Progressing  Goal: Returns to optimal restraint-free functioning  Description: INTERVENTIONS:  - Assess the patient's behavior and symptoms that indicate continued need for restraint  - Identify and implement measures to help patient regain control  - Assess readiness for release of restraint   Outcome: Progressing

## 2021-02-19 PROBLEM — R79.89 ELEVATED D-DIMER: Status: ACTIVE | Noted: 2021-01-01

## 2021-02-19 PROBLEM — R10.84 GENERALIZED ABDOMINAL PAIN: Status: ACTIVE | Noted: 2021-01-01

## 2021-02-19 NOTE — ASSESSMENT & PLAN NOTE
· Continue severe pathway treatment protocol  ? On HFNC 100%/50L, try to stay off NRB  ? Antibiotics discontinued due to negative procal x 2  ? Completed course of Remdesivir   ? Continue steroids, Day 14, continue to taper   ? Actemra 2/14  ? Continue lovenox per COVID protocol   ? Encourage self-proning and incentive spirometry  ? Trend CRP, D-Dimer, Ferritin q48hr  ?  Wean O2 to maintain saturations > 88-90%

## 2021-02-19 NOTE — PLAN OF CARE
Problem: PAIN - ADULT  Goal: Verbalizes/displays adequate comfort level or baseline comfort level  Description: Interventions:  - Encourage patient to monitor pain and request assistance  - Assess pain using appropriate pain scale  - Administer analgesics based on type and severity of pain and evaluate response  - Implement non-pharmacological measures as appropriate and evaluate response  - Consider cultural and social influences on pain and pain management  - Notify physician/advanced practitioner if interventions unsuccessful or patient reports new pain  Outcome: Progressing     Problem: INFECTION - ADULT  Goal: Absence or prevention of progression during hospitalization  Description: INTERVENTIONS:  - Assess and monitor for signs and symptoms of infection  - Monitor lab/diagnostic results  - Monitor all insertion sites, i e  indwelling lines, tubes, and drains  - Monitor endotracheal if appropriate and nasal secretions for changes in amount and color  - [place] appropriate cooling/warming therapies per order  - Administer medications as ordered  - Instruct and encourage patient and family to use good hand hygiene technique  - Identify and instruct in appropriate isolation precautions for identified infection/condition  Outcome: Progressing     Problem: SAFETY ADULT  Goal: Patient will remain free of falls  Description: INTERVENTIONS:  - Assess patient frequently for physical needs  -  Identify cognitive and physical deficits and behaviors that affect risk of falls    -  [place] fall precautions as indicated by assessment   - Educate patient/family on patient safety including physical limitations  - Instruct patient to call for assistance with activity based on assessment  - Modify environment to reduce risk of injury  - Consider OT/PT consult to assist with strengthening/mobility  Outcome: Progressing  Goal: Maintain or return to baseline ADL function  Description: INTERVENTIONS:  -  Assess patient's ability to carry out ADLs; assess patient's baseline for ADL function and identify physical deficits which impact ability to perform ADLs (bathing, care of mouth/teeth, toileting, grooming, dressing, etc )  - Assess/evaluate cause of self-care deficits   - Assess range of motion  - Assess patient's mobility; develop plan if impaired  - Assess patient's need for assistive devices and provide as appropriate  - Encourage maximum independence but intervene and supervise when necessary  - Involve family in performance of ADLs  - Assess for home care needs following discharge   - Consider OT consult to assist with ADL evaluation and planning for discharge  - Provide patient education as appropriate  Outcome: Progressing  Goal: Maintain or return mobility status to optimal level  Description: INTERVENTIONS:  - Assess patient's baseline mobility status (ambulation, transfers, stairs, etc )    - Identify cognitive and physical deficits and behaviors that affect mobility  - Identify mobility aids required to assist with transfers and/or ambulation (gait belt, sit-to-stand, lift, walker, cane, etc )  - [place] fall precautions as indicated by assessment  - Record patient progress and toleration of activity level on Mobility SBAR; progress patient to next Phase/Stage  - Instruct patient to call for assistance with activity based on assessment  - Consider rehabilitation consult to assist with strengthening/weightbearing, etc   Outcome: Progressing     Problem: DISCHARGE PLANNING  Goal: Discharge to home or other facility with appropriate resources  Description: INTERVENTIONS:  - Identify barriers to discharge w/patient and caregiver  - Arrange for needed discharge resources and transportation as appropriate  - Identify discharge learning needs (meds, wound care, etc )  - Arrange for interpretive services to assist at discharge as needed  - Refer to Case Management Department for coordinating discharge planning if the patient needs post-hospital services based on physician/advanced practitioner order or complex needs related to functional status, cognitive ability, or social support system  Outcome: Progressing     Problem: Knowledge Deficit  Goal: Patient/family/caregiver demonstrates understanding of disease process, treatment plan, medications, and discharge instructions  Description: Complete learning assessment and assess knowledge base  Interventions:  - Provide teaching at level of understanding  - Provide teaching via preferred learning methods  Outcome: Progressing     Problem: Potential for Falls  Goal: Patient will remain free of falls  Description: INTERVENTIONS:  - Assess patient frequently for physical needs  -  Identify cognitive and physical deficits and behaviors that affect risk of falls    -  [place] fall precautions as indicated by assessment   - Educate patient/family on patient safety including physical limitations  - Instruct patient to call for assistance with activity based on assessment  - Modify environment to reduce risk of injury  - Consider OT/PT consult to assist with strengthening/mobility  Outcome: Progressing     Problem: Prexisting or High Potential for Compromised Skin Integrity  Goal: Skin integrity is maintained or improved  Description: INTERVENTIONS:  - Identify patients at risk for skin breakdown  - Assess and monitor skin integrity  - Assess and monitor nutrition and hydration status  - Monitor labs   - Assess for incontinence   - Turn and reposition patient  - Assist with mobility/ambulation  - Relieve pressure over bony prominences  - Avoid friction and shearing  - Provide appropriate hygiene as needed including keeping skin clean and dry  - Evaluate need for skin moisturizer/barrier cream  - Collaborate with interdisciplinary team   - Patient/family teaching  - Consider wound care consult   Outcome: Progressing     Problem: Nutrition/Hydration-ADULT  Goal: Nutrient/Hydration intake appropriate for improving, restoring or maintaining nutritional needs  Description: Monitor and assess patient's nutrition/hydration status for malnutrition  Collaborate with interdisciplinary team and initiate plan and interventions as ordered  Monitor patient's weight and dietary intake as ordered or per policy  Utilize nutrition screening tool and intervene as necessary  Determine patient's food preferences and provide high-protein, high-caloric foods as appropriate       INTERVENTIONS:  - Monitor oral intake, urinary output, labs, and treatment plans  - Assess nutrition and hydration status and recommend course of action  - Evaluate amount of meals eaten  - Assist patient with eating if necessary   - Allow adequate time for meals  - Recommend/ encourage appropriate diets, oral nutritional supplements, and vitamin/mineral supplements  - Order, calculate, and assess calorie counts as needed  - Recommend, monitor, and adjust tube feedings and TPN/PPN based on assessed needs  - Assess need for intravenous fluids  - Provide specific nutrition/hydration education as appropriate  - Include patient/family/caregiver in decisions related to nutrition  Outcome: Progressing     Problem: RESPIRATORY - ADULT  Goal: Achieves optimal ventilation and oxygenation  Description: INTERVENTIONS:  - Assess for changes in respiratory status  - Assess for changes in mentation and behavior  - Position to facilitate oxygenation and minimize respiratory effort  - Oxygen administered by appropriate delivery if ordered  - Initiate smoking cessation education as indicated  - Encourage broncho-pulmonary hygiene including cough, deep breathe, Incentive Spirometry  - Assess the need for suctioning and aspirate as needed  - Assess and instruct to report SOB or any respiratory difficulty  - Respiratory Therapy support as indicated  Outcome: Progressing     Problem: METABOLIC, FLUID AND ELECTROLYTES - ADULT  Goal: Electrolytes maintained within normal limits  Description: INTERVENTIONS:  - Monitor labs and assess patient for signs and symptoms of electrolyte imbalances  - Administer electrolyte replacement as ordered  - Monitor response to electrolyte replacements, including repeat lab results as appropriate  - Instruct patient on fluid and nutrition as appropriate  Outcome: Progressing  Goal: Fluid balance maintained  Description: INTERVENTIONS:  - Monitor labs   - Monitor I/O and WT  - Instruct patient on fluid and nutrition as appropriate  - Assess for signs & symptoms of volume excess or deficit  Outcome: Progressing     Problem: SAFETY,RESTRAINT: NV/NON-SELF DESTRUCTIVE BEHAVIOR  Goal: Remains free of harm/injury (restraint for non violent/non self-detsructive behavior)  Description: INTERVENTIONS:  - Instruct patient/family regarding restraint use   - Assess and monitor physiologic and psychological status   - Provide interventions and comfort measures to meet assessed patient needs   - Identify and implement measures to help patient regain control  - Assess readiness for release of restraint   Outcome: Progressing  Goal: Returns to optimal restraint-free functioning  Description: INTERVENTIONS:  - Assess the patient's behavior and symptoms that indicate continued need for restraint  - Identify and implement measures to help patient regain control  - Assess readiness for release of restraint   Outcome: Progressing

## 2021-02-19 NOTE — ASSESSMENT & PLAN NOTE
· WBC 11 51  · Lines: peripheral IVs  · CXR 2/5 - Suspected bilateral Covid pneumonia  · Physical exam unchanged, denies fever/chills/pain  · Likely 2/2 steroids  · Plan  · Continue to monitor am cbc q48h

## 2021-02-19 NOTE — QUICK NOTE
Attempted to call the patient's son Aquiles Antunez around 1130am today  He did not answer  I left a message that someone would be in touch with him tomorrow to update him if we do not reach him today

## 2021-02-19 NOTE — ASSESSMENT & PLAN NOTE
· 3 79>3 97   · LE dopplers negative  · Continue to monitor d-dimer q48h  · Continue with therapeutic lovenox

## 2021-02-19 NOTE — PLAN OF CARE
Problem: PAIN - ADULT  Goal: Verbalizes/displays adequate comfort level or baseline comfort level  Description: Interventions:  - Encourage patient to monitor pain and request assistance  - Assess pain using appropriate pain scale  - Administer analgesics based on type and severity of pain and evaluate response  - Implement non-pharmacological measures as appropriate and evaluate response  - Consider cultural and social influences on pain and pain management  - Notify physician/advanced practitioner if interventions unsuccessful or patient reports new pain  Outcome: Progressing     Problem: INFECTION - ADULT  Goal: Absence or prevention of progression during hospitalization  Description: INTERVENTIONS:  - Assess and monitor for signs and symptoms of infection  - Monitor lab/diagnostic results  - Monitor all insertion sites, i e  indwelling lines, tubes, and drains  - Monitor endotracheal if appropriate and nasal secretions for changes in amount and color  - [place] appropriate cooling/warming therapies per order  - Administer medications as ordered  - Instruct and encourage patient and family to use good hand hygiene technique  - Identify and instruct in appropriate isolation precautions for identified infection/condition  Outcome: Progressing     Problem: SAFETY ADULT  Goal: Patient will remain free of falls  Description: INTERVENTIONS:  - Assess patient frequently for physical needs  -  Identify cognitive and physical deficits and behaviors that affect risk of falls    -  [place] fall precautions as indicated by assessment   - Educate patient/family on patient safety including physical limitations  - Instruct patient to call for assistance with activity based on assessment  - Modify environment to reduce risk of injury  - Consider OT/PT consult to assist with strengthening/mobility  Outcome: Progressing  Goal: Maintain or return to baseline ADL function  Description: INTERVENTIONS:  -  Assess patient's ability to carry out ADLs; assess patient's baseline for ADL function and identify physical deficits which impact ability to perform ADLs (bathing, care of mouth/teeth, toileting, grooming, dressing, etc )  - Assess/evaluate cause of self-care deficits   - Assess range of motion  - Assess patient's mobility; develop plan if impaired  - Assess patient's need for assistive devices and provide as appropriate  - Encourage maximum independence but intervene and supervise when necessary  - Involve family in performance of ADLs  - Assess for home care needs following discharge   - Consider OT consult to assist with ADL evaluation and planning for discharge  - Provide patient education as appropriate  Outcome: Progressing  Goal: Maintain or return mobility status to optimal level  Description: INTERVENTIONS:  - Assess patient's baseline mobility status (ambulation, transfers, stairs, etc )    - Identify cognitive and physical deficits and behaviors that affect mobility  - Identify mobility aids required to assist with transfers and/or ambulation (gait belt, sit-to-stand, lift, walker, cane, etc )  - [place] fall precautions as indicated by assessment  - Record patient progress and toleration of activity level on Mobility SBAR; progress patient to next Phase/Stage  - Instruct patient to call for assistance with activity based on assessment  - Consider rehabilitation consult to assist with strengthening/weightbearing, etc   Outcome: Progressing     Problem: DISCHARGE PLANNING  Goal: Discharge to home or other facility with appropriate resources  Description: INTERVENTIONS:  - Identify barriers to discharge w/patient and caregiver  - Arrange for needed discharge resources and transportation as appropriate  - Identify discharge learning needs (meds, wound care, etc )  - Arrange for interpretive services to assist at discharge as needed  - Refer to Case Management Department for coordinating discharge planning if the patient needs post-hospital services based on physician/advanced practitioner order or complex needs related to functional status, cognitive ability, or social support system  Outcome: Progressing     Problem: Knowledge Deficit  Goal: Patient/family/caregiver demonstrates understanding of disease process, treatment plan, medications, and discharge instructions  Description: Complete learning assessment and assess knowledge base  Interventions:  - Provide teaching at level of understanding  - Provide teaching via preferred learning methods  Outcome: Progressing     Problem: Potential for Falls  Goal: Patient will remain free of falls  Description: INTERVENTIONS:  - Assess patient frequently for physical needs  -  Identify cognitive and physical deficits and behaviors that affect risk of falls    -  [place] fall precautions as indicated by assessment   - Educate patient/family on patient safety including physical limitations  - Instruct patient to call for assistance with activity based on assessment  - Modify environment to reduce risk of injury  - Consider OT/PT consult to assist with strengthening/mobility  Outcome: Progressing     Problem: Prexisting or High Potential for Compromised Skin Integrity  Goal: Skin integrity is maintained or improved  Description: INTERVENTIONS:  - Identify patients at risk for skin breakdown  - Assess and monitor skin integrity  - Assess and monitor nutrition and hydration status  - Monitor labs   - Assess for incontinence   - Turn and reposition patient  - Assist with mobility/ambulation  - Relieve pressure over bony prominences  - Avoid friction and shearing  - Provide appropriate hygiene as needed including keeping skin clean and dry  - Evaluate need for skin moisturizer/barrier cream  - Collaborate with interdisciplinary team   - Patient/family teaching  - Consider wound care consult   Outcome: Progressing     Problem: Nutrition/Hydration-ADULT  Goal: Nutrient/Hydration intake appropriate for improving, restoring or maintaining nutritional needs  Description: Monitor and assess patient's nutrition/hydration status for malnutrition  Collaborate with interdisciplinary team and initiate plan and interventions as ordered  Monitor patient's weight and dietary intake as ordered or per policy  Utilize nutrition screening tool and intervene as necessary  Determine patient's food preferences and provide high-protein, high-caloric foods as appropriate       INTERVENTIONS:  - Monitor oral intake, urinary output, labs, and treatment plans  - Assess nutrition and hydration status and recommend course of action  - Evaluate amount of meals eaten  - Assist patient with eating if necessary   - Allow adequate time for meals  - Recommend/ encourage appropriate diets, oral nutritional supplements, and vitamin/mineral supplements  - Order, calculate, and assess calorie counts as needed  - Recommend, monitor, and adjust tube feedings and TPN/PPN based on assessed needs  - Assess need for intravenous fluids  - Provide specific nutrition/hydration education as appropriate  - Include patient/family/caregiver in decisions related to nutrition  Outcome: Progressing     Problem: RESPIRATORY - ADULT  Goal: Achieves optimal ventilation and oxygenation  Description: INTERVENTIONS:  - Assess for changes in respiratory status  - Assess for changes in mentation and behavior  - Position to facilitate oxygenation and minimize respiratory effort  - Oxygen administered by appropriate delivery if ordered  - Initiate smoking cessation education as indicated  - Encourage broncho-pulmonary hygiene including cough, deep breathe, Incentive Spirometry  - Assess the need for suctioning and aspirate as needed  - Assess and instruct to report SOB or any respiratory difficulty  - Respiratory Therapy support as indicated  Outcome: Progressing     Problem: METABOLIC, FLUID AND ELECTROLYTES - ADULT  Goal: Electrolytes maintained within normal limits  Description: INTERVENTIONS:  - Monitor labs and assess patient for signs and symptoms of electrolyte imbalances  - Administer electrolyte replacement as ordered  - Monitor response to electrolyte replacements, including repeat lab results as appropriate  - Instruct patient on fluid and nutrition as appropriate  Outcome: Progressing  Goal: Fluid balance maintained  Description: INTERVENTIONS:  - Monitor labs   - Monitor I/O and WT  - Instruct patient on fluid and nutrition as appropriate  - Assess for signs & symptoms of volume excess or deficit  Outcome: Progressing     Problem: SAFETY,RESTRAINT: [place]/NON-SELF DESTRUCTIVE BEHAVIOR  Goal: Remains free of harm/injury (restraint for non violent/non self-detsructive behavior)  Description: INTERVENTIONS:  - Instruct patient/family regarding restraint use   - Assess and monitor physiologic and psychological status   - Provide interventions and comfort measures to meet assessed patient needs   - Identify and implement measures to help patient regain control  - Assess readiness for release of restraint   Outcome: Progressing  Goal: Returns to optimal restraint-free functioning  Description: INTERVENTIONS:  - Assess the patient's behavior and symptoms that indicate continued need for restraint  - Identify and implement measures to help patient regain control  - Assess readiness for release of restraint   Outcome: Progressing

## 2021-02-19 NOTE — PHYSICAL THERAPY NOTE
Physical Therapy Cancellation Note      Attempted to see pt for PT intervention  Pt declined participation  Therapist provided education regarding need to progress mobilization and motivation  Pt continued to refuse stating he would hire someone to take him home and push him around in a wheelchair  PT session cancelled  Notified Seun ROJAS of cancellation      Dwain Thakur, PT

## 2021-02-19 NOTE — ASSESSMENT & PLAN NOTE
· Very mild pain  · Abdominal exam unremarkable  · Check CBC, CMP   · No change in bowel habits, N/V, fever or chills reported  · No urinary symptoms  · Continue to monitor today and see how he tolerates breakfast

## 2021-02-19 NOTE — PROGRESS NOTES
Progress Note - Rakesh Oswald 1945, 70 y o  male MRN: [id number]    Unit/Bed#: ICU 11 Encounter: [id number]    Primary Care Provider: No primary care provider on file  Date and time admitted to hospital: 2/5/2021 11:04 AM    Generalized abdominal pain  Assessment & Plan  · Very mild pain  · Abdominal exam unremarkable  · Check CBC, CMP   · No change in bowel habits, N/V, fever or chills reported  · No urinary symptoms  · Continue to monitor today and see how he tolerates breakfast      Elevated d-dimer  Assessment & Plan  · 3 79>3 97   · LE dopplers negative  · Continue to monitor d-dimer q48h  · Continue with therapeutic lovenox    Hyperlipidemia  Assessment & Plan  · Continue statin    Moderate protein-calorie malnutrition (HCC)  Assessment & Plan  Malnutrition Findings:   Adult Malnutrition type: Acute illness  Adult Degree of Malnutrition: Malnutrition of moderate degree(related to acute medical condition, inadequate energy intake)    BMI Findings: Body mass index is 27 72 kg/m²     · Continue with diet recommendations per nutrition    Weight loss  Assessment & Plan  · Total loss of approximately 30Lb since 11/2020  · Suspected 2/2 to poor diet   · Nutrition consult placed, appreciate recommendations  · Will need f/u outpatient with PCP for further evaluation    Weakness  Assessment & Plan  · PT/OT consulted  · They will continue to follow inpatient, and they recommend post acute rehab at this time    Leukocytosis  Assessment & Plan  · WBC 11 51  · Lines: peripheral IVs  · CXR 2/5 - Suspected bilateral Covid pneumonia  · Physical exam unchanged, denies fever/chills/pain  · Likely 2/2 steroids  · Plan  · Continue to monitor am cbc q48h     Delirium due to general medical condition  Assessment & Plan  · Started on zyprexa 2 5mg HS with improvement, may d/c  · Continue melatonin hs    Chronic atrial fibrillation (HCC)  Assessment & Plan  · Metoprolol 25mg BID for rate control  · Coumadin on hold on admission for supratherapeutic INR, continue therapeutic lovenox for now per COVID protocol       Acute respiratory failure with hypoxia (HCC)  Assessment & Plan  · Secondary to COVID PNA  · Continue treatment per protocol  · Continue HFNC +/- NRB to maintain SpO2 >88%     * Pneumonia due to COVID-19 virus  Assessment & Plan  · Continue severe pathway treatment protocol  ? On HFNC 100%/50L, try to stay off NRB  ? Antibiotics discontinued due to negative procal x 2  ? Completed course of Remdesivir   ? Continue steroids, Day 14, continue to taper   ? Actemra 2/14  ? Continue lovenox per COVID protocol   ? Encourage self-proning and incentive spirometry  ? Trend CRP, D-Dimer, Ferritin q48hr  ? Wean O2 to maintain saturations > 88-90%      ----------------------------------------------------------------------------------------  HPI/24hr events: no overnight events, LE dopplers negative    On HFNC 100%50L  ---------------------------------------------------------------------------------------  SUBJECTIVE  He is complaining of some abdominal pain this morning, states it has been present for about 10 minutes and is "not that bad " The pain is an all over discomfort and does not appear to be localized to a specific location  He reports having a normal BM yesterday, and has been urinating without issue  He denies N/V, fever, chills  He continues to report SOB unchanged and weakness  Review of Systems   Constitutional: Positive for fatigue  Negative for appetite change, chills, diaphoresis and fever  Respiratory: Positive for shortness of breath  Negative for cough  Cardiovascular: Negative for chest pain, palpitations and leg swelling  Gastrointestinal: Positive for abdominal pain  Negative for blood in stool, constipation, diarrhea, nausea and vomiting  Genitourinary: Negative for difficulty urinating, dysuria, frequency and hematuria  Musculoskeletal: Negative for arthralgias and back pain     Skin: Negative for color change, pallor and wound  Neurological: Positive for weakness  Negative for headaches  Psychiatric/Behavioral: Negative for dysphoric mood and sleep disturbance  Review of systems was reviewed and negative unless stated above in HPI/24-hour events   ---------------------------------------------------------------------------------------  Disposition: Continue Stepdown Level 1 level of care   Code Status: Level 1 - Full Code  ---------------------------------------------------------------------------------------  ICU CORE MEASURES    Prophylaxis   VTE Pharmacologic Prophylaxis: Enoxaparin (Lovenox)  VTE Mechanical Prophylaxis: sequential compression device  Stress Ulcer Prophylaxis: Famotidine PO    ABCDE Protocol (if indicated)  Plan to perform spontaneous awakening trial today? Not applicable  Plan to perform spontaneous breathing trial today? Not applicable  Obvious barriers to extubation? Not applicable  CAM-ICU: n/a    Invasive Devices Review  Invasive Devices     Peripheral Intravenous Line            Peripheral IV 21 Left Arm 3 days              Can any invasive devices be discontinued today?  No  ---------------------------------------------------------------------------------------  OBJECTIVE    Vitals   Vitals:    21 2300 21 2342 21 0235 21 0649   BP: 115/67      BP Location: Right arm      Pulse: 71  63    Resp: 16  21    Temp:   (!) 97 3 °F (36 3 °C)    TempSrc:   Oral    SpO2: 95% 93% 95%    Weight:    96 1 kg (211 lb 13 8 oz)     Temp (24hrs), Av 7 °F (36 5 °C), Min:97 3 °F (36 3 °C), Max:98 °F (36 7 °C)  Current: Temperature: (!) 97 3 °F (36 3 °C)  HR: 83  BP: 112/64  RR: 25  SpO2: 92    Respiratory:  SpO2: SpO2: 95 %, SpO2 Activity: SpO2 Activity: At Rest, SpO2 Device: O2 Device: High flow nasal cannula  Nasal Cannula O2 Flow Rate (L/min): 4 L/min    Invasive/non-invasive ventilation settings   Respiratory    Lab Data (Last 4 hours)    None O2/Vent Data (Last 4 hours)      02/19 0400          Non-Invasive Ventilation Mode HFNC (High flow)                   Physical Exam  Constitutional:       General: He is not in acute distress  Appearance: He is normal weight  He is ill-appearing  HENT:      Head: Normocephalic and atraumatic  Nose: Nose normal       Mouth/Throat:      Mouth: Mucous membranes are moist       Pharynx: Oropharynx is clear  Eyes:      Extraocular Movements: Extraocular movements intact  Conjunctiva/sclera: Conjunctivae normal       Pupils: Pupils are equal, round, and reactive to light  Neck:      Musculoskeletal: Neck supple  No neck rigidity or muscular tenderness  Cardiovascular:      Rate and Rhythm: Normal rate  Rhythm irregular  Pulses: Normal pulses  Heart sounds: Normal heart sounds  Pulmonary:      Effort: No respiratory distress  Breath sounds: Normal breath sounds  No wheezing or rhonchi  Abdominal:      General: Abdomen is flat  Bowel sounds are normal  There is no distension  Palpations: Abdomen is soft  There is no mass  Tenderness: There is no abdominal tenderness  There is no guarding or rebound  Hernia: No hernia is present  Musculoskeletal:      Right lower leg: No edema  Left lower leg: No edema  Skin:     General: Skin is warm and dry  Capillary Refill: Capillary refill takes less than 2 seconds  Neurological:      General: No focal deficit present  Mental Status: He is alert and oriented to person, place, and time     Psychiatric:         Mood and Affect: Mood normal          Behavior: Behavior normal          Laboratory and Diagnostics:  Results from last 7 days   Lab Units 02/18/21  0458 02/16/21  0506 02/15/21  0526 02/14/21  0659 02/13/21  0551   WBC Thousand/uL 11 51* 14 26* 11 17* 12 30* 13 53*   HEMOGLOBIN g/dL 17 0 17 1* 17 2* 15 8 16 4   HEMATOCRIT % 52 7* 53 2* 53 2* 48 8 50 3*   PLATELETS Thousands/uL 135* 153 145* 137* 167 NEUTROS PCT % 89* 95*  --   --  91*   BANDS PCT %  --   --   --  5  --    MONOS PCT % 3* 2*  --   --  5   MONO PCT %  --   --  0* 4  --      Results from last 7 days   Lab Units 02/18/21  0458 02/16/21  0506 02/15/21  1755 02/15/21  0526 02/14/21  0659 02/13/21  0551   SODIUM mmol/L 138 139 138 139 140 139   POTASSIUM mmol/L 5 0 5 1 5 0 5 9* 5 2 5 0   CHLORIDE mmol/L 104 105 104 105 106 105   CO2 mmol/L 31 29 29 29 26 28   ANION GAP mmol/L 3* 5 5 5 8 6   BUN mg/dL 41* 41* 46* 48* 43* 42*   CREATININE mg/dL 0 89 1 04 1 24 0 99 0 95 1 01   CALCIUM mg/dL 9 0 8 5 8 3 8 9 8 3 8 3   GLUCOSE RANDOM mg/dL 96 184* 181* 128 118 105   ALT U/L  --   --   --  46 25  --    AST U/L  --   --   --  56* 53*  --    ALK PHOS U/L  --   --   --  145* 115  --    ALBUMIN g/dL  --   --   --  2 2* 2 1*  --    TOTAL BILIRUBIN mg/dL  --   --   --  2 02* 2 58*  --      Results from last 7 days   Lab Units 02/16/21  0506 02/13/21  0551   MAGNESIUM mg/dL 2 5 2 4   PHOSPHORUS mg/dL 2 9  --                    ABG:    VBG:          Micro        EKG: no new  Imaging:  I have personally reviewed pertinent reports  Intake and Output  I/O       02/17 0701 - 02/18 0700 02/18 0701 - 02/19 0700    P  O  820 360    I V  (mL/kg)  10 (0 1)    Total Intake(mL/kg) 820 (8 6) 370 (3 9)    Urine (mL/kg/hr) 1525 (0 7) 1225 (0 5)    Stool 0     Total Output 1525 1225    Net -705 -856          Unmeasured Stool Occurrence 0 x         Height and Weights      IBW: -88 kg  Body mass index is 27 95 kg/m²    Weight (last 2 days)     Date/Time   Weight    02/19/21 0649   96 1 (211 86)                Nutrition       Diet Orders   (From admission, onward)             Start     Ordered    02/17/21 1522  Dietary nutrition supplements  Once     Question Answer Comment   Select Supplement: Magic Cup [place]    Frequency Lunch        02/17/21 1521    02/11/21 1339  Dietary nutrition supplements  Once     Question Answer Comment   Select Supplement: Ensure Enlive-Vanilla Frequency Breakfast, Dinner        02/11/21 1338    02/05/21 2218  Room Service  Once     Question:  Type of Service  Answer:  Room Service - Appropriate with Assistance    02/05/21 2218 02/05/21 1722  Diet Cardiovascular; Cardiac  Diet effective now     Question Answer Comment   Diet Type Cardiovascular    Cardiac Cardiac    RD to adjust diet per protocol?  Yes        02/05/21 1721              Active Medications  Scheduled Meds:  Current Facility-Administered Medications   Medication Dose Route Frequency Provider Last Rate    atorvastatin  40 mg Oral HS Terryl Sprinkles, PA-VICKEY      benzonatate  100 mg Oral TID PRN Terryl Sprinkles, PA-VICKEY      cholecalciferol  2,000 Units Oral Daily Terryl Sprinkles, KATHIE      dexamethasone  6 mg Intravenous Q24H Heath England MD      enoxaparin  1 mg/kg Subcutaneous Q12H [facility] Jorgeronnell Roe PA-C      famotidine  20 mg Oral BID Terryl Sprinkles, KATHIE      melatonin  10 5 mg Oral HS Cathi Roe PA-C      metoprolol tartrate  25 mg Oral Q12H [facility] Marc Giang MD      multivitamin-minerals  1 tablet Oral Daily Cathi Roe PA-C      OLANZapine  2 5 mg Oral HS Marc Giang MD      ondansetron  4 mg Intravenous Q6H PRN Terryl Sprinkles, KATHIE      polyethylene glycol  17 g Oral Daily PRN Marc Giang MD      senna-docusate sodium  1 tablet Oral BID Terryl Sprinkles, KATHIE      tamsulosin  0 4 mg Oral Daily Cathi Roe PA-C       Continuous Infusions:     PRN Meds:   benzonatate, 100 mg, TID PRN  ondansetron, 4 mg, Q6H PRN  polyethylene glycol, 17 g, Daily PRN        Allergies   No Known Allergies  ---------------------------------------------------------------------------------------  Advance Directive and Living Will:      Power of :    POLST:    ---------------------------------------------------------------------------------------  Care Time Delivered:   No Critical Care time spent     Lin Ross, DO      Portions of the record may have been created with voice recognition software  Occasional wrong word or "sound a like" substitutions may have occurred due to the inherent limitations of voice recognition software    Read the chart carefully and recognize, using context, where substitutions have occurred

## 2021-02-20 PROBLEM — R10.84 GENERALIZED ABDOMINAL PAIN: Status: RESOLVED | Noted: 2021-01-01 | Resolved: 2021-01-01

## 2021-02-20 PROBLEM — F05 DELIRIUM DUE TO GENERAL MEDICAL CONDITION: Status: RESOLVED | Noted: 2021-01-01 | Resolved: 2021-01-01

## 2021-02-20 NOTE — PROGRESS NOTES
Progress Note - Mohit Patterson 1945, 70 y o  male MRN: [id number]    Unit/Bed#: ICU 11 Encounter: [id number]    Primary Care Provider: No primary care provider on file  Date and time admitted to hospital: 2/5/2021 11:04 AM    * Pneumonia due to COVID-19 virus  Assessment & Plan  · Continue severe pathway treatment protocol  ? On HFNC 88%/50L, off NRB, maintaining spo2 of 93%  ? Antibiotics discontinued due to negative procal x 2  ? Completed course of Remdesivir   ? Continue steroids taper, started on 60 mg on 2/19 of prednisone, will decrease by 10 mg every 2 days   ? Actemra 2/14  ? Continue lovenox per COVID protocol   ? Encourage self-proning and incentive spirometry  ? Trend CRP, D-Dimer, Ferritin q48hr  ? Wean O2 to maintain saturations > 88-90%      Acute respiratory failure with hypoxia (HCC)  Assessment & Plan  · Secondary to COVID PNA  · Continue treatment per protocol  · Continue HFNC +/- NRB to maintain SpO2 >88%   · OOB as tolerated   · Encourage self-proning     Chronic atrial fibrillation (HCC)  Assessment & Plan  · Continue Metoprolol 25mg BID for rate control  · HR 70-80s  · Coumadin on hold on admission for supratherapeutic INR, continue therapeutic lovenox for now per COVID protocol       Coagulopathy [facility])  Assessment & Plan  · Patient was on coumadin prior to hospitalization  · Transitioned to lovenox  · INR is improving 2/12 - 2 83    Elevated d-dimer  Assessment & Plan  · D-dimer 3 60  · LE dopplers negative  · Continue to monitor d-dimer q48h  · Continue with therapeutic lovenox    Hyperlipidemia  Assessment & Plan  · Continue statin    Moderate protein-calorie malnutrition ([facility] [address] )  Assessment & Plan  Malnutrition Findings:   Adult Malnutrition type: Acute illness  Adult Degree of Malnutrition: Malnutrition of moderate degree(related to acute medical condition, inadequate energy intake)    BMI Findings: Body mass index is 26 99 kg/m²     · Continue with diet recommendations per nutrition    Weight loss  Assessment & Plan  · Total loss of approximately 30Lb since 11/2020  · Suspected 2/2 to poor diet   · Nutrition consult placed, appreciate recommendations  · Will need f/u outpatient with PCP for further evaluation    Weakness  Assessment & Plan  · PT/OT consulted  · They will continue to follow inpatient, and they recommend post acute rehab at this time    Leukocytosis  Assessment & Plan  · WBC trending up 17 36  · Lines: peripheral IVs  · CXR 2/5 - Suspected bilateral Covid pneumonia  · Physical exam unchanged, denies fever/chills/pain  · Likely 2/2 steroids  · Plan  · Continue to monitor am cbc q48h     Generalized abdominal pain-resolved as of 2/20/2021  Assessment & Plan  · Very mild pain  · Abdominal exam unremarkable  · Check CBC, CMP   · No change in bowel habits, N/V, fever or chills reported  · No urinary symptoms  · Continue to monitor today and see how he tolerates breakfast      Delirium due to general medical condition-resolved as of 2/20/2021  Assessment & Plan  · Resolved   · D/c zyprexa  · Continue melatonin hs    ----------------------------------------------------------------------------------------  HPI/24hr events: No acute events overnight  Patient remained off NRBR this AM continues to do well on Hi-margarita NC with o2 > 90%  Disposition: Continue Stepdown Level 1 level of care   Code Status: Level 1 - Full Code  ---------------------------------------------------------------------------------------  SUBJECTIVE  "Oh I'm all right"     Review of Systems   Constitutional: Negative  HENT: Negative  Eyes: Negative  Respiratory: Negative  Cardiovascular: Negative  Gastrointestinal: Negative  Endocrine: Negative  Genitourinary: Negative  Musculoskeletal: Negative  Skin: Negative  Allergic/Immunologic: Negative  Neurological: Negative  Hematological: Negative  Psychiatric/Behavioral: Negative  ---------------------------------------------------------------------------------------  OBJECTIVE    Vitals   Vitals:    21 0441 21 0452 21 0600 21 0703   BP: 106/74   95/71   BP Location:    Right arm   Pulse: 74   68   Resp: (!) 26   (!) 25   Temp: (!) 97 4 °F (36 3 °C)   97 5 °F (36 4 °C)   TempSrc: Oral   Oral   SpO2: 91% (!) 89%  92%   Weight: 92 8 kg (204 lb 9 4 oz)  92 8 kg (204 lb 9 4 oz)      Temp (24hrs), Av 1 °F (36 2 °C), Min:96 5 °F (35 8 °C), Max:97 7 °F (36 5 °C)  Current: Temperature: 97 5 °F (36 4 °C)          Respiratory:  SpO2: SpO2: 92 %  Nasal Cannula O2 Flow Rate (L/min): 4 L/min    Invasive/non-invasive ventilation settings   Respiratory    Lab Data (Last 4 hours)    None         O2/Vent Data (Last 4 hours)      452          Non-Invasive Ventilation Mode HFNC (High flow)                   Physical Exam  Constitutional:       Appearance: Normal appearance  HENT:      Head: Normocephalic and atraumatic  Nose: Nose normal       Mouth/Throat:      Mouth: Mucous membranes are moist       Pharynx: Oropharynx is clear  Eyes:      Extraocular Movements: Extraocular movements intact  Pupils: Pupils are equal, round, and reactive to light  Neck:      Musculoskeletal: Normal range of motion  Cardiovascular:      Rate and Rhythm: Normal rate  Rhythm irregular  Pulses: Normal pulses  Radial pulses are 2+ on the right side and 2+ on the left side  Dorsalis pedis pulses are 2+ on the right side and 2+ on the left side  Pulmonary:      Effort: Pulmonary effort is normal       Breath sounds: Examination of the right-lower field reveals decreased breath sounds  Examination of the left-lower field reveals decreased breath sounds  Decreased breath sounds present  Abdominal:      General: Bowel sounds are normal       Palpations: Abdomen is soft  Tenderness: There is no abdominal tenderness     Musculoskeletal: Normal range of motion  Comments: Generalized deconditioning    Lymphadenopathy:      Cervical: No cervical adenopathy  Skin:     General: Skin is warm  Nails: There is no clubbing  Neurological:      Mental Status: He is alert and oriented to person, place, and time  Psychiatric:         Speech: Speech normal          Cognition and Memory: Cognition normal          Laboratory and Diagnostics:  Results from last 7 days   Lab Units 02/20/21 0429 02/18/21  0458 02/16/21  0506 02/15/21  0526 02/14/21  0659   WBC Thousand/uL 17 36* 11 51* 14 26* 11 17* 12 30*   HEMOGLOBIN g/dL 16 3 17 0 17 1* 17 2* 15 8   HEMATOCRIT % 49 2 52 7* 53 2* 53 2* 48 8   PLATELETS Thousands/uL 168 135* 153 145* 137*   NEUTROS PCT % 91* 89* 95*  --   --    BANDS PCT %  --   --   --   --  5   MONOS PCT % 3* 3* 2*  --   --    MONO PCT %  --   --   --  0* 4     Results from last 7 days   Lab Units 02/20/21 0429 02/18/21 0458 02/16/21  0506 02/15/21  1755 02/15/21  0526 02/14/21  0659   SODIUM mmol/L 137 138 139 138 139 140   POTASSIUM mmol/L 5 2 5 0 5 1 5 0 5 9* 5 2   CHLORIDE mmol/L 103 104 105 104 105 106   CO2 mmol/L 29 31 29 29 29 26   ANION GAP mmol/L 5 3* 5 5 5 8   BUN mg/dL 44* 41* 41* 46* 48* 43*   CREATININE mg/dL 1 01 0 89 1 04 1 24 0 99 0 95   CALCIUM mg/dL 8 9 9 0 8 5 8 3 8 9 8 3   GLUCOSE RANDOM mg/dL 108 96 184* 181* 128 118   ALT U/L  --   --   --   --  46 25   AST U/L  --   --   --   --  56* 53*   ALK PHOS U/L  --   --   --   --  145* 115   ALBUMIN g/dL  --   --   --   --  2 2* 2 1*   TOTAL BILIRUBIN mg/dL  --   --   --   --  2 02* 2 58*     Results from last 7 days   Lab Units 02/20/21 0429 02/16/21  0506   MAGNESIUM mg/dL 2 4 2 5   PHOSPHORUS mg/dL  --  2 9                   ABG:    VBG:          Micro        EKG: afib   Imaging: I have personally reviewed pertinent reports  Intake and Output  I/O       02/18 0701 - 02/19 0700 02/19 0701 - 02/20 0700 02/20 0701 - 02/21 0700    P  O  360 340     I V  (mL/kg) 10 (0 1) Total Intake(mL/kg) 370 (3 9) 340 (3 7)     Urine (mL/kg/hr) 1225 (0 5) 1475 (0 7)     Stool       Total Output 1225 1475     Net -945 -8477                  Height and Weights      IBW: -88 kg  Body mass index is 26 99 kg/m²  Weight (last 2 days)     Date/Time   Weight    02/20/21 0600   92 8 (204 59)    02/20/21 0441   92 8 (204 59)    02/19/21 0649   96 1 (211 86)                Nutrition       Diet Orders   (From admission, onward)             Start     Ordered    02/17/21 1522  Dietary nutrition supplements  Once     Question Answer Comment   Select Supplement: Magic Cup [place]    Frequency Lunch        02/17/21 1521    02/11/21 1339  Dietary nutrition supplements  Once     Question Answer Comment   Select Supplement: Ensure Enlive-Vanilla    Frequency Breakfast, Dinner        02/11/21 1338    02/05/21 2218  Room Service  Once     Question:  Type of Service  Answer:  Room Service - Appropriate with Assistance    02/05/21 2218 02/05/21 1722  Diet Cardiovascular; Cardiac  Diet effective now     Question Answer Comment   Diet Type Cardiovascular    Cardiac Cardiac    RD to adjust diet per protocol?  Yes        02/05/21 1721                  Active Medications  Scheduled Meds:  Current Facility-Administered Medications   Medication Dose Route Frequency Provider Last Rate    atorvastatin  40 mg Oral HS JOAQUIN Franks-VICKEY      benzonatate  100 mg Oral TID PRN JOAQUIN Franks-C      cholecalciferol  2,000 Units Oral Daily JOAQUIN Franks-C      enoxaparin  1 mg/kg Subcutaneous Q12H [facility] Joyce Roe PA-C      famotidine  20 mg Oral BID Dodie Mesa PA-C      melatonin  10 5 mg Oral HS Dodie Mesa PA-C      metoprolol tartrate  25 mg Oral Q12H [facility] Sukumar Cain MD      multivitamin-minerals  1 tablet Oral Daily Dodie Mesa PA-C      ondansetron  4 mg Intravenous Q6H PRN Joyce Roe PA-C      polyethylene glycol  17 g Oral Daily PRN Aumi Gibran Altman MD      predniSONE  60 mg Oral Daily SKINNY Curry      senna-docusate sodium  1 tablet Oral BID Lakshmi Torres PA-C      tamsulosin  0 4 mg Oral Daily Cathi Roe PA-C       Continuous Infusions:     PRN Meds:   benzonatate, 100 mg, TID PRN  ondansetron, 4 mg, Q6H PRN  polyethylene glycol, 17 g, Daily PRN        Invasive Devices Review  Invasive Devices     Peripheral Intravenous Line            Peripheral IV 02/16/21 Left Arm 4 days    Peripheral IV 02/20/21 Right;Dorsal (posterior) Hand less than 1 day                Rationale for remaining devices: n/a   ---------------------------------------------------------------------------------------  Advance Directive and Living Will:      Power of :    POLST:    ---------------------------------------------------------------------------------------  Care Time Delivered:   No Critical Care time spent       [facility]      Portions of the record may have been created with voice recognition software  Occasional wrong word or "sound a like" substitutions may have occurred due to the inherent limitations of voice recognition software    Read the chart carefully and recognize, using context, where substitutions have occurred

## 2021-02-20 NOTE — ASSESSMENT & PLAN NOTE
· Continue Metoprolol 25mg BID for rate control  · HR 70-80s  · Coumadin on hold on admission for supratherapeutic INR, continue therapeutic lovenox for now per COVID protocol

## 2021-02-20 NOTE — ASSESSMENT & PLAN NOTE
Malnutrition Findings:   Adult Malnutrition type: Acute illness  Adult Degree of Malnutrition: Malnutrition of moderate degree(related to acute medical condition, inadequate energy intake)    BMI Findings: Body mass index is 26 99 kg/m²     · Continue with diet recommendations per nutrition

## 2021-02-20 NOTE — ASSESSMENT & PLAN NOTE
· Continue severe pathway treatment protocol  ? On HFNC 88%/50L, off NRB, maintaining spo2 of 93%  ? Antibiotics discontinued due to negative procal x 2  ? Completed course of Remdesivir   ? Continue steroids taper, started on 60 mg on 2/19 of prednisone, will decrease by 10 mg every 2 days   ? Actemra 2/14  ? Continue lovenox per COVID protocol   ? Encourage self-proning and incentive spirometry  ? Trend CRP, D-Dimer, Ferritin q48hr  ?  Wean O2 to maintain saturations > 88-90%

## 2021-02-20 NOTE — ASSESSMENT & PLAN NOTE
· Patient was on coumadin prior to hospitalization  · Transitioned to lovenox  · INR is improving 2/12 - 2 83

## 2021-02-20 NOTE — ASSESSMENT & PLAN NOTE
· D-dimer 3 60  · LE dopplers negative  · Continue to monitor d-dimer q48h  · Continue with therapeutic lovenox

## 2021-02-20 NOTE — ASSESSMENT & PLAN NOTE
· Secondary to COVID PNA  · Continue treatment per protocol  · Continue HFNC +/- NRB to maintain SpO2 >88%   · OOB as tolerated   · Encourage self-proning

## 2021-02-20 NOTE — ASSESSMENT & PLAN NOTE
· WBC trending up 17 36  · Lines: peripheral IVs  · CXR 2/5 - Suspected bilateral Covid pneumonia  · Physical exam unchanged, denies fever/chills/pain  · Likely 2/2 steroids  · Plan  · Continue to monitor am cbc q48h

## 2021-02-21 NOTE — PROGRESS NOTES
Last office visit: 11/14/16  Patient due for DM labs   Pt requesting refills on Metformin    Progress Note - Cierra Almanzar 1945, 70 y o  male MRN: [id number]    Unit/Bed#: ICU 11 Encounter: [id number]    Primary Care Provider: No primary care provider on file  Date and time admitted to hospital: 2/5/2021 11:04 AM    Elevated d-dimer  Assessment & Plan  · D-dimer 3 60  · LE dopplers negative  · Continue to monitor d-dimer q48h  · Continue with therapeutic lovenox    Hyperlipidemia  Assessment & Plan  · Continue statin    Moderate protein-calorie malnutrition (HCC)  Assessment & Plan  Malnutrition Findings:   Adult Malnutrition type: Acute illness  Adult Degree of Malnutrition: Malnutrition of moderate degree(related to acute medical condition, inadequate energy intake)    BMI Findings: Body mass index is 26 99 kg/m²     · Continue with diet recommendations per nutrition    Weight loss  Assessment & Plan  · Total loss of approximately 30Lb since 11/2020  · Suspected 2/2 to poor diet   · Nutrition consult placed, appreciate recommendations  · Will need f/u outpatient with PCP for further evaluation    Weakness  Assessment & Plan  · PT/OT consulted  · They will continue to follow inpatient, and they recommend post acute rehab at this time    Leukocytosis  Assessment & Plan  · WBC trending up 17 36  · Lines: peripheral IVs  · CXR 2/5 - Suspected bilateral Covid pneumonia  · Physical exam unchanged, denies fever/chills/pain  · Likely 2/2 steroids  · Plan  · Continue to monitor am cbc q48h     Chronic atrial fibrillation (HCC)  Assessment & Plan  · Continue Metoprolol 25mg BID for rate control  · HR 70-80s  · Coumadin on hold on admission for supratherapeutic INR, continue therapeutic lovenox for now per COVID protocol       Acute respiratory failure with hypoxia ([facility] [address] )  Assessment & Plan  · Secondary to COVID PNA  · Continue treatment per protocol  · Continue HFNC +/- NRB to maintain SpO2 >88%   · OOB as tolerated   · Encourage self-proning     * Pneumonia due to COVID-19 virus  Assessment & Plan  · Continue severe pathway treatment protocol  ? On HFNC 70%/40L, off NRB, maintaining spo2 of low 90's  ? Antibiotics discontinued due to negative procal x 2  ? Completed course of Remdesivir   ? Continue steroids taper, started on 60 mg on 2/19 of prednisone, will decrease by 10 mg every 2 days   ? Actemra 2/14  ? Continue lovenox per COVID protocol   ? Encourage self-proning and incentive spirometry  ? Trend CRP, D-Dimer, Ferritin q48hr  ? Wean O2 to maintain saturations > 88-90%      ----------------------------------------------------------------------------------------  HPI/24hr events: No overnight events  HFNC 70%40L  ---------------------------------------------------------------------------------------  SUBJECTIVE  Patient reports feeling well this morning, and that he slept well overnight  He reports SOB and weakness unchanged  Review of Systems   Constitutional: Negative for appetite change, chills, fatigue and fever  Respiratory: Positive for shortness of breath  Negative for cough  Cardiovascular: Negative for chest pain, palpitations and leg swelling  Gastrointestinal: Negative for abdominal pain, blood in stool, constipation, diarrhea, nausea and vomiting  Genitourinary: Negative for difficulty urinating, dysuria, frequency and hematuria  Neurological: Positive for weakness  Negative for dizziness, light-headedness and headaches       Review of systems was reviewed and negative unless stated above in HPI/24-hour events   ---------------------------------------------------------------------------------------  Disposition: Continue Stepdown Level 1 level of care   Code Status: Level 1 - Full Code  ---------------------------------------------------------------------------------------  ICU CORE MEASURES    Prophylaxis   VTE Pharmacologic Prophylaxis: Enoxaparin (Lovenox)  VTE Mechanical Prophylaxis: sequential compression device  Stress Ulcer Prophylaxis: Famotidine PO    ABCDE Protocol (if indicated)  Plan to perform spontaneous awakening trial today? Not applicable  Plan to perform spontaneous breathing trial today? Not applicable  Obvious barriers to extubation? Not applicable  CAM-ICU: N/a    Invasive Devices Review  Invasive Devices     Peripheral Intravenous Line            Peripheral IV 21 Right;Dorsal (posterior) Hand 1 day              Can any invasive devices be discontinued today? No  ---------------------------------------------------------------------------------------  OBJECTIVE    Vitals   Vitals:    21 0346 21 0600 21 0701 21 0732   BP:   107/63    BP Location:   Right arm    Pulse:   88    Resp:   (!) 30    Temp:   97 6 °F (36 4 °C)    TempSrc:   Oral    SpO2: 91%  95% 93%   Weight:  92 6 kg (204 lb 2 3 oz)       Temp (24hrs), Av 6 °F (36 4 °C), Min:97 4 °F (36 3 °C), Max:97 9 °F (36 6 °C)  Current: Temperature: 97 6 °F (36 4 °C)  HR: 93  BP: 107/63  RR: 17  SpO2: 94% 70%/40L HFNC    Respiratory:  SpO2: SpO2: 93 %, SpO2 Activity: SpO2 Activity: At Rest, SpO2 Device: O2 Device: High flow nasal cannula  Nasal Cannula O2 Flow Rate (L/min): 4 L/min    Invasive/non-invasive ventilation settings   Respiratory    Lab Data (Last 4 hours)    None         O2/Vent Data (Last 4 hours)       0732          Non-Invasive Ventilation Mode HFNC (High flow)                   Physical Exam  Constitutional:       General: He is not in acute distress  Appearance: He is normal weight  HENT:      Head: Normocephalic and atraumatic  Right Ear: External ear normal       Left Ear: External ear normal       Nose: Nose normal       Mouth/Throat:      Mouth: Mucous membranes are moist       Pharynx: Oropharynx is clear  Eyes:      Extraocular Movements: Extraocular movements intact  Conjunctiva/sclera: Conjunctivae normal    Neck:      Musculoskeletal: Neck supple  Cardiovascular:      Rate and Rhythm: Normal rate  Rhythm irregular  Pulses: Normal pulses  Heart sounds: Normal heart sounds  Pulmonary:      Breath sounds: Normal breath sounds  No wheezing or rhonchi  Abdominal:      General: Abdomen is flat  Bowel sounds are normal  There is no distension  Tenderness: There is no abdominal tenderness  There is no guarding or rebound  Comments: Lower abdomen is firm  Musculoskeletal:      Right lower leg: No edema  Left lower leg: No edema  Skin:     General: Skin is warm and dry  Capillary Refill: Capillary refill takes less than 2 seconds  Neurological:      General: No focal deficit present  Mental Status: He is alert and oriented to person, place, and time     Psychiatric:         Mood and Affect: Mood normal          Behavior: Behavior normal          Laboratory and Diagnostics:  Results from last 7 days   Lab Units 02/20/21  0429 02/18/21  0458 02/16/21  0506 02/15/21  0526   WBC Thousand/uL 17 36* 11 51* 14 26* 11 17*   HEMOGLOBIN g/dL 16 3 17 0 17 1* 17 2*   HEMATOCRIT % 49 2 52 7* 53 2* 53 2*   PLATELETS Thousands/uL 168 135* 153 145*   NEUTROS PCT % 91* 89* 95*  --    MONOS PCT % 3* 3* 2*  --    MONO PCT %  --   --   --  0*     Results from last 7 days   Lab Units 02/20/21 0429 02/18/21  0458 02/16/21  0506 02/15/21  1755 02/15/21  0526   SODIUM mmol/L 137 138 139 138 139   POTASSIUM mmol/L 5 2 5 0 5 1 5 0 5 9*   CHLORIDE mmol/L 103 104 105 104 105   CO2 mmol/L 29 31 29 29 29   ANION GAP mmol/L 5 3* 5 5 5   BUN mg/dL 44* 41* 41* 46* 48*   CREATININE mg/dL 1 01 0 89 1 04 1 24 0 99   CALCIUM mg/dL 8 9 9 0 8 5 8 3 8 9   GLUCOSE RANDOM mg/dL 108 96 184* 181* 128   ALT U/L  --   --   --   --  46   AST U/L  --   --   --   --  56*   ALK PHOS U/L  --   --   --   --  145*   ALBUMIN g/dL  --   --   --   --  2 2*   TOTAL BILIRUBIN mg/dL  --   --   --   --  2 02*     Results from last 7 days   Lab Units 02/20/21 0429 02/16/21  0506   MAGNESIUM mg/dL 2 4 2 5   PHOSPHORUS mg/dL  --  2 9                   ABG: VBG:          Micro        EKG: no new  Imaging:  I have personally reviewed pertinent reports  Intake and Output  I/O       02/19 0701 - 02/20 0700 02/20 0701 - 02/21 0700    P  O  340 820    Total Intake(mL/kg) 340 (3 7) 820 (8 9)    Urine (mL/kg/hr) 1475 (0 7) 1175 (0 5)    Stool  0    Total Output 1475 1175    Net -1135 -355          Unmeasured Stool Occurrence  0 x             Height and Weights      IBW: -88 kg  Body mass index is 26 93 kg/m²  Weight (last 2 days)     Date/Time   Weight    02/21/21 0600   92 6 (204 15)    02/20/21 0600   92 8 (204 59)    02/20/21 0441   92 8 (204 59)    02/19/21 0649   96 1 (211 86)                Nutrition       Diet Orders   (From admission, onward)             Start     Ordered    02/17/21 1522  Dietary nutrition supplements  Once     Question Answer Comment   Select Supplement: Magic Cup Cb    Frequency Lunch        02/17/21 1521    02/11/21 1339  Dietary nutrition supplements  Once     Question Answer Comment   Select Supplement: Ensure Enlive-Vanilla    Frequency Breakfast, Dinner        02/11/21 1338    02/05/21 2218  Room Service  Once     Question:  Type of Service  Answer:  Room Service - Appropriate with Assistance    02/05/21 2218    02/05/21 1722  Diet Cardiovascular; Cardiac  Diet effective now     Question Answer Comment   Diet Type Cardiovascular    Cardiac Cardiac    RD to adjust diet per protocol?  Yes        02/05/21 1721                    Active Medications  Scheduled Meds:  Current Facility-Administered Medications   Medication Dose Route Frequency Provider Last Rate    atorvastatin  40 mg Oral HS Gwynneth Fill, PA-C      benzonatate  100 mg Oral TID PRN Gwynneth Fill, PA-C      cholecalciferol  2,000 Units Oral Daily Gwynneth Fill, PA-C      enoxaparin  1 mg/kg Subcutaneous Q12H [facility] KATHIE Roe      famotidine  20 mg Oral BID Gwynneth Fill, PA-C      melatonin  6 mg Oral HS MD Mildred Jauregui [facility] metoprolol tartrate  25 mg Oral Q12H [address] Yair Magaña MD      multivitamin-minerals  1 tablet Oral Daily Enrico Morales PA-C      ondansetron  4 mg Intravenous Q6H PRN Enrico Morales PA-C      polyethylene glycol  17 g Oral Daily PRN Yair Magaña MD      [START ON 3/1/2021] predniSONE  10 mg Oral Daily With Breakfast SKINNY Calvo      [START ON 2/25/2021] predniSONE  30 mg Oral Daily With Breakfast Azul Trey CRNP      Followed by   Erum Po ON 2/27/2021] predniSONE  20 mg Oral Daily With Breakfast Azul Trey, CRNP      predniSONE  50 mg Oral Daily With Breakfast Azul Trey, CRNP      Followed by   Erum Po ON 2/23/2021] predniSONE  40 mg Oral Daily With Breakfast SKINNY Calvo      senna-docusate sodium  1 tablet Oral BID Enrico Morales PA-C      tamsulosin  0 4 mg Oral Daily Cathi Roe PA-C       Continuous Infusions:     PRN Meds:   benzonatate, 100 mg, TID PRN  ondansetron, 4 mg, Q6H PRN  polyethylene glycol, 17 g, Daily PRN        Allergies   No Known Allergies  ---------------------------------------------------------------------------------------  Advance Directive and Living Will:      Power of :    POLST:    ---------------------------------------------------------------------------------------  Care Time Delivered:   No Critical Care time spent     Odalys Mayorga, DO      Portions of the record may have been created with voice recognition software  Occasional wrong word or "sound a like" substitutions may have occurred due to the inherent limitations of voice recognition software    Read the chart carefully and recognize, using context, where substitutions have occurred

## 2021-02-21 NOTE — ASSESSMENT & PLAN NOTE
· Continue severe pathway treatment protocol  ? On HFNC 70%/40L, off NRB, maintaining spo2 of low 90's  ? Antibiotics discontinued due to negative procal x 2  ? Completed course of Remdesivir   ? Continue steroids taper, started on 60 mg on 2/19 of prednisone, will decrease by 10 mg every 2 days   ? Actemra 2/14  ? Continue lovenox per COVID protocol   ? Encourage self-proning and incentive spirometry  ? Trend CRP, D-Dimer, Ferritin q48hr  ?  Wean O2 to maintain saturations > 88-90%

## 2021-02-21 NOTE — QUICK NOTE
I called and spoke with the patient's son Nate  I updated him on his father's progress and treatment plan  He expressed concerns that his sister and him have regarding his father's mood at night  He has been calling them and seems very distraught at times  I reassured him that we would start by adding back his Zyprexa medication at night and see if that helps and that we would continue to call them daily to see if they notice improvement   I answered all questions and he was appreciative of the call

## 2021-02-22 NOTE — ASSESSMENT & PLAN NOTE
· Total loss of approximately 40Lb since 11/2020 and 18Lb since admission  · Suspected 2/2 to poor diet, Nutrition consult placed, currently on strict calorie counting diet  · Will need f/u outpatient with PCP for further evaluation

## 2021-02-22 NOTE — ASSESSMENT & PLAN NOTE
· Continue severe pathway treatment protocol  ? On HFNC 65%/30L, off NRB, maintaining spo2 of low 90's  ? Antibiotics discontinued due to negative procal x 2  ? Completed course of Remdesivir   ? Continue steroids taper, started on 60 mg on 2/19 of prednisone, will decrease by 10 mg every 2 days   ? Actemra 2/14  ? Continue lovenox per COVID protocol   ? Encourage self-proning and incentive spirometry  ? Trend CRP, D-Dimer, Ferritin q48hr  ?  Wean O2 to maintain saturations > 88-90%

## 2021-02-22 NOTE — CASE MANAGEMENT
CM received an email from patient's daughter Jae Dugan with [address] paperwork attached  All pages were printed and placed in medical records bin for scanning  CM department will continue to follow to assist with discharge coordination

## 2021-02-22 NOTE — QUICK NOTE
I called and spoke to the patient's daughter regarding her father's progress and his current treatment plan  We discussed her father's frustrations with requiring hospitalization and I re-assured her we would continue to do our best to help him understand his need for hospitalization at this time  I also informed her of our plan to increase his nighttime Zyprexa back to 5mg and she was okay with this plan  I let her know we would be trying him on [address] today and if he tolerates this well we would transfer him off ICU status tomorrow  I answered any questions she had and she was very appreciative of the call

## 2021-02-22 NOTE — PROGRESS NOTES
Pt called the police as he felt like he was being held hostage  RN entered the room and pt remembered calling the police and was redirected/educated  Pt's cell phone placed on his bedside table  Pt apologized and advised nursing staff that he didn't need to make any more calls tonight  RN notified the critical care team/charge nurse of the situation

## 2021-02-22 NOTE — ASSESSMENT & PLAN NOTE
Malnutrition Findings:   Adult Malnutrition type: Acute illness  Adult Degree of Malnutrition: Malnutrition of moderate degree(related to acute medical condition, inadequate energy intake)    BMI Findings: Body mass index is 26 93 kg/m²     · Continue with diet recommendations per nutrition

## 2021-02-22 NOTE — ASSESSMENT & PLAN NOTE
· WBC trending up 19 94  · Lines: peripheral IVs  · CXR 2/5 - Suspected bilateral Covid pneumonia  · Physical exam unchanged, denies fever/chills/pain  · Likely 2/2 steroids, was recntly transitioned to oral prednisone  · Plan  · Continue to monitor am cbc

## 2021-02-22 NOTE — CASE MANAGEMENT
CM received an email from patient's daughter with some medical questions and concerns  MAGUE responded to Luis Eduardo Norris that these would be forwarded on to the Critical Care team who will address them with her  Luis Eduardo Norris responded back that she understood and was appreciative of the assistance  MAGUE forwarded Anna's concerns on to Critical Care AP who will have the resident reach out and address  CM department will continue to follow to assist with discharge coordination

## 2021-02-22 NOTE — ASSESSMENT & PLAN NOTE
· D-dimer 2 62, trending down  · LE dopplers negative  · Continue to monitor d-dimer q48h  · Continue with therapeutic lovenox

## 2021-02-22 NOTE — PLAN OF CARE
Problem: PAIN - ADULT  Goal: Verbalizes/displays adequate comfort level or baseline comfort level  Description: Interventions:  - Encourage patient to monitor pain and request assistance  - Assess pain using appropriate pain scale  - Administer analgesics based on type and severity of pain and evaluate response  - Implement non-pharmacological measures as appropriate and evaluate response  - Consider cultural and social influences on pain and pain management  - Notify physician/advanced practitioner if interventions unsuccessful or patient reports new pain  Outcome: Progressing     Problem: INFECTION - ADULT  Goal: Absence or prevention of progression during hospitalization  Description: INTERVENTIONS:  - Assess and monitor for signs and symptoms of infection  - Monitor lab/diagnostic results  - Monitor all insertion sites, i e  indwelling lines, tubes, and drains  - Monitor endotracheal if appropriate and nasal secretions for changes in amount and color  - [place] appropriate cooling/warming therapies per order  - Administer medications as ordered  - Instruct and encourage patient and family to use good hand hygiene technique  - Identify and instruct in appropriate isolation precautions for identified infection/condition  Outcome: Progressing     Problem: SAFETY ADULT  Goal: Patient will remain free of falls  Description: INTERVENTIONS:  - Assess patient frequently for physical needs  -  Identify cognitive and physical deficits and behaviors that affect risk of falls    -  [place] fall precautions as indicated by assessment   - Educate patient/family on patient safety including physical limitations  - Instruct patient to call for assistance with activity based on assessment  - Modify environment to reduce risk of injury  - Consider OT/PT consult to assist with strengthening/mobility  Outcome: Progressing  Goal: Maintain or return to baseline ADL function  Description: INTERVENTIONS:  -  Assess patient's ability to carry out ADLs; assess patient's baseline for ADL function and identify physical deficits which impact ability to perform ADLs (bathing, care of mouth/teeth, toileting, grooming, dressing, etc )  - Assess/evaluate cause of self-care deficits   - Assess range of motion  - Assess patient's mobility; develop plan if impaired  - Assess patient's need for assistive devices and provide as appropriate  - Encourage maximum independence but intervene and supervise when necessary  - Involve family in performance of ADLs  - Assess for home care needs following discharge   - Consider OT consult to assist with ADL evaluation and planning for discharge  - Provide patient education as appropriate  Outcome: Progressing  Goal: Maintain or return mobility status to optimal level  Description: INTERVENTIONS:  - Assess patient's baseline mobility status (ambulation, transfers, stairs, etc )    - Identify cognitive and physical deficits and behaviors that affect mobility  - Identify mobility aids required to assist with transfers and/or ambulation (gait belt, sit-to-stand, lift, walker, cane, etc )  - [place] fall precautions as indicated by assessment  - Record patient progress and toleration of activity level on Mobility SBAR; progress patient to next Phase/Stage  - Instruct patient to call for assistance with activity based on assessment  - Consider rehabilitation consult to assist with strengthening/weightbearing, etc   Outcome: Progressing     Problem: DISCHARGE PLANNING  Goal: Discharge to home or other facility with appropriate resources  Description: INTERVENTIONS:  - Identify barriers to discharge w/patient and caregiver  - Arrange for needed discharge resources and transportation as appropriate  - Identify discharge learning needs (meds, wound care, etc )  - Arrange for interpretive services to assist at discharge as needed  - Refer to Case Management Department for coordinating discharge planning if the patient needs post-hospital services based on physician/advanced practitioner order or complex needs related to functional status, cognitive ability, or social support system  Outcome: Progressing     Problem: Knowledge Deficit  Goal: Patient/family/caregiver demonstrates understanding of disease process, treatment plan, medications, and discharge instructions  Description: Complete learning assessment and assess knowledge base  Interventions:  - Provide teaching at level of understanding  - Provide teaching via preferred learning methods  Outcome: Progressing     Problem: Potential for Falls  Goal: Patient will remain free of falls  Description: INTERVENTIONS:  - Assess patient frequently for physical needs  -  Identify cognitive and physical deficits and behaviors that affect risk of falls    -  [place] fall precautions as indicated by assessment   - Educate patient/family on patient safety including physical limitations  - Instruct patient to call for assistance with activity based on assessment  - Modify environment to reduce risk of injury  - Consider OT/PT consult to assist with strengthening/mobility  Outcome: Progressing     Problem: Prexisting or High Potential for Compromised Skin Integrity  Goal: Skin integrity is maintained or improved  Description: INTERVENTIONS:  - Identify patients at risk for skin breakdown  - Assess and monitor skin integrity  - Assess and monitor nutrition and hydration status  - Monitor labs   - Assess for incontinence   - Turn and reposition patient  - Assist with mobility/ambulation  - Relieve pressure over bony prominences  - Avoid friction and shearing  - Provide appropriate hygiene as needed including keeping skin clean and dry  - Evaluate need for skin moisturizer/barrier cream  - Collaborate with interdisciplinary team   - Patient/family teaching  - Consider wound care consult   Outcome: Progressing     Problem: Nutrition/Hydration-ADULT  Goal: Nutrient/Hydration intake appropriate for improving, restoring or maintaining nutritional needs  Description: Monitor and assess patient's nutrition/hydration status for malnutrition  Collaborate with interdisciplinary team and initiate plan and interventions as ordered  Monitor patient's weight and dietary intake as ordered or per policy  Utilize nutrition screening tool and intervene as necessary  Determine patient's food preferences and provide high-protein, high-caloric foods as appropriate       INTERVENTIONS:  - Monitor oral intake, urinary output, labs, and treatment plans  - Assess nutrition and hydration status and recommend course of action  - Evaluate amount of meals eaten  - Assist patient with eating if necessary   - Allow adequate time for meals  - Recommend/ encourage appropriate diets, oral nutritional supplements, and vitamin/mineral supplements  - Order, calculate, and assess calorie counts as needed  - Recommend, monitor, and adjust tube feedings and TPN/PPN based on assessed needs  - Assess need for intravenous fluids  - Provide specific nutrition/hydration education as appropriate  - Include patient/family/caregiver in decisions related to nutrition  Outcome: Progressing     Problem: RESPIRATORY - ADULT  Goal: Achieves optimal ventilation and oxygenation  Description: INTERVENTIONS:  - Assess for changes in respiratory status  - Assess for changes in mentation and behavior  - Position to facilitate oxygenation and minimize respiratory effort  - Oxygen administered by appropriate delivery if ordered  - Initiate smoking cessation education as indicated  - Encourage broncho-pulmonary hygiene including cough, deep breathe, Incentive Spirometry  - Assess the need for suctioning and aspirate as needed  - Assess and instruct to report SOB or any respiratory difficulty  - Respiratory Therapy support as indicated  Outcome: Progressing     Problem: METABOLIC, FLUID AND ELECTROLYTES - ADULT  Goal: Electrolytes maintained within normal limits  Description: INTERVENTIONS:  - Monitor labs and assess patient for signs and symptoms of electrolyte imbalances  - Administer electrolyte replacement as ordered  - Monitor response to electrolyte replacements, including repeat lab results as appropriate  - Instruct patient on fluid and nutrition as appropriate  Outcome: Progressing  Goal: Fluid balance maintained  Description: INTERVENTIONS:  - Monitor labs   - Monitor I/O and WT  - Instruct patient on fluid and nutrition as appropriate  - Assess for signs & symptoms of volume excess or deficit  Outcome: Progressing     Problem: SAFETY,RESTRAINT: NV/NON-SELF DESTRUCTIVE BEHAVIOR  Goal: Remains free of harm/injury (restraint for non violent/non self-detsructive behavior)  Description: INTERVENTIONS:  - Instruct patient/family regarding restraint use   - Assess and monitor physiologic and psychological status   - Provide interventions and comfort measures to meet assessed patient needs   - Identify and implement measures to help patient regain control  - Assess readiness for release of restraint   Outcome: Progressing  Goal: Returns to optimal restraint-free functioning  Description: INTERVENTIONS:  - Assess the patient's behavior and symptoms that indicate continued need for restraint  - Identify and implement measures to help patient regain control  - Assess readiness for release of restraint   Outcome: Progressing

## 2021-02-22 NOTE — ASSESSMENT & PLAN NOTE
· Patient was on coumadin prior to hospitalization  · LE duplex bilateral was negative  · Transitioned to lovenox  · D-dimer trending down

## 2021-02-22 NOTE — PROGRESS NOTES
Progress Note - Jodie Sotelo 1945, 70 y o  male MRN: [id number]    Unit/Bed#: ICU 11 Encounter: [id number]    Primary Care Provider: No primary care provider on file  Date and time admitted to hospital: 2/5/2021 11:04 AM        Elevated d-dimer  Assessment & Plan  · D-dimer 2 62, trending down  · LE dopplers negative  · Continue to monitor d-dimer q48h  · Continue with therapeutic lovenox    Hyperlipidemia  Assessment & Plan  · Continue statin    Moderate protein-calorie malnutrition (HCC)  Assessment & Plan  Malnutrition Findings:   Adult Malnutrition type: Acute illness  Adult Degree of Malnutrition: Malnutrition of moderate degree(related to acute medical condition, inadequate energy intake)    BMI Findings: Body mass index is 26 93 kg/m²     · Continue with diet recommendations per nutrition    Weight loss  Assessment & Plan  · Total loss of approximately 40Lb since 11/2020 and 18Lb since admission  · Suspected 2/2 to poor diet, Nutrition consult placed, currently on strict calorie counting diet  · Will need f/u outpatient with PCP for further evaluation    Weakness  Assessment & Plan  · PT/OT consulted  · They will continue to follow inpatient, and they recommend post acute rehab at this time    Leukocytosis  Assessment & Plan  · WBC trending up 19 94  · Lines: peripheral IVs  · CXR 2/5 - Suspected bilateral Covid pneumonia  · Physical exam unchanged, denies fever/chills/pain  · Likely 2/2 steroids, was recntly transitioned to oral prednisone  · Plan  · Continue to monitor am cbc     Delirium due to general medical condition  Assessment & Plan  · Continue melatonin hs  · Added back zyprexa at 2 5mg qhs yesterday, consider increasing back to 5mg qhs  · Continue to monitor     Chronic atrial fibrillation (HCC)  Assessment & Plan  · Continue Metoprolol 25mg BID for rate control  · HR 70-80s  · Coumadin on hold on admission for supratherapeutic INR, continue therapeutic lovenox for now per COVID protocol       Acute respiratory failure with hypoxia (HCC)  Assessment & Plan  · Secondary to COVID PNA  · Continue treatment per protocol  · Continue HFNC +/- NRB to maintain SpO2 >88%   · OOB as tolerated   · Encourage self-proning     Coagulopathy ([address] )  Assessment & Plan  · Patient was on coumadin prior to hospitalization  · LE duplex bilateral was negative  · Transitioned to lovenox  · D-dimer trending down    * Pneumonia due to COVID-19 virus  Assessment & Plan  · Continue severe pathway treatment protocol  ? On HFNC 65%/30L, off NRB, maintaining spo2 of low 90's  ? Antibiotics discontinued due to negative procal x 2  ? Completed course of Remdesivir   ? Continue steroids taper, started on 60 mg on 2/19 of prednisone, will decrease by 10 mg every 2 days   ? Actemra 2/14  ? Continue lovenox per COVID protocol   ? Encourage self-proning and incentive spirometry  ? Trend CRP, D-Dimer, Ferritin q48hr  ? Wean O2 to maintain saturations > 88-90%      ----------------------------------------------------------------------------------------  HPI/24hr events: Overnight delirium reported  Patient called the  and told them he was being held captive  Has had episodes over the last few nights of calling his family members and not making sense  Has continued to be appropriate with all staff throughout his hospital stay       ---------------------------------------------------------------------------------------  SUBJECTIVE  Reports his weakness is improving and his breathing as well  He is ready to go home and does not want to be here anymore  Otherwise no complaints  Review of Systems   Constitutional: Positive for unexpected weight change  Negative for appetite change, chills, fatigue and fever  Respiratory: Positive for shortness of breath  Negative for cough, chest tightness and wheezing  Cardiovascular: Negative for chest pain, palpitations and leg swelling     Gastrointestinal: Negative for abdominal pain, constipation, diarrhea, nausea and vomiting  Genitourinary: Negative for difficulty urinating, dysuria, flank pain, frequency and hematuria  Neurological: Negative for dizziness, weakness and headaches  Psychiatric/Behavioral: Negative for confusion, self-injury and suicidal ideas  The patient is nervous/anxious  Review of systems was reviewed and negative unless stated above in HPI/24-hour events   ---------------------------------------------------------------------------------------  Disposition: Continue Stepdown Level 1 level of care   Code Status: Level 1 - Full Code  ---------------------------------------------------------------------------------------  ICU CORE MEASURES    Prophylaxis   VTE Pharmacologic Prophylaxis: Enoxaparin (Lovenox)  VTE Mechanical Prophylaxis: sequential compression device  Stress Ulcer Prophylaxis: Famotidine PO    ABCDE Protocol (if indicated)  Plan to perform spontaneous awakening trial today? Not applicable  Plan to perform spontaneous breathing trial today? Not applicable  Obvious barriers to extubation? Not applicable  CAM-ICU: n/a    Invasive Devices Review  Invasive Devices     Peripheral Intravenous Line            Peripheral IV 21 Right;Dorsal (posterior) Hand 2 days              Can any invasive devices be discontinued today?  No  ---------------------------------------------------------------------------------------  OBJECTIVE    Vitals   Vitals:    21 0000 21 0300 21 0700 21 0745   BP: 93/63 93/66 109/70    BP Location: Right arm Right arm     Pulse: 82 73 69    Resp: 22 (!) 25 (!) 24    Temp: 98 4 °F (36 9 °C) 97 7 °F (36 5 °C) 97 8 °F (36 6 °C)    TempSrc: Oral Oral Oral    SpO2: 90% 92% 92% 91%   Weight:         Temp (24hrs), Av 8 °F (36 6 °C), Min:97 5 °F (36 4 °C), Max:98 4 °F (36 9 °C)  Current: Temperature: 97 8 °F (36 6 °C)  HR: 93  BP: 94/65  RR: 26  SpO2: 90%    Respiratory:  SpO2: SpO2: 91 %, SpO2 Activity: SpO2 Activity: At Rest, SpO2 Device: O2 Device: High flow nasal cannula  Nasal Cannula O2 Flow Rate (L/min): 4 L/min    Invasive/non-invasive ventilation settings   Respiratory    Lab Data (Last 4 hours)    None         O2/Vent Data (Last 4 hours)      02/22 0745          Non-Invasive Ventilation Mode HFNC (High flow)                   Physical Exam  Constitutional:       General: He is not in acute distress  Appearance: Normal appearance  He is normal weight  He is not ill-appearing  HENT:      Head: Normocephalic and atraumatic  Nose: Nose normal  No congestion  Mouth/Throat:      Mouth: Mucous membranes are moist       Pharynx: Oropharynx is clear  Eyes:      Extraocular Movements: Extraocular movements intact  Conjunctiva/sclera: Conjunctivae normal       Pupils: Pupils are equal, round, and reactive to light  Cardiovascular:      Rate and Rhythm: Normal rate  Rhythm irregular  Pulses: Normal pulses  Heart sounds: Normal heart sounds  Pulmonary:      Breath sounds: Normal breath sounds  No wheezing or rhonchi  Abdominal:      General: Abdomen is flat  Bowel sounds are normal  There is no distension  Palpations: Abdomen is soft  Tenderness: There is no abdominal tenderness  There is no guarding or rebound  Musculoskeletal:      Right lower leg: No edema  Left lower leg: No edema  Skin:     General: Skin is warm and dry  Capillary Refill: Capillary refill takes less than 2 seconds  Neurological:      General: No focal deficit present  Mental Status: He is alert and oriented to person, place, and time     Psychiatric:      Comments: Mood agitated, behavior normal         Laboratory and Diagnostics:  Results from last 7 days   Lab Units 02/22/21  0416 02/20/21  0429 02/18/21  0458 02/16/21  0506   WBC Thousand/uL 19 94* 17 36* 11 51* 14 26*   HEMOGLOBIN g/dL 17 1* 16 3 17 0 17 1*   HEMATOCRIT % 51 1* 49 2 52 7* 53 2*   PLATELETS Thousands/uL 167 168 135* 153   NEUTROS PCT %  --  91* 89* 95*   BANDS PCT % 2  --   --   --    MONOS PCT %  --  3* 3* 2*   MONO PCT % 1*  --   --   --      Results from last 7 days   Lab Units 02/22/21  0416 02/21/21  0819 02/20/21  0429 02/18/21  0458 02/16/21  0506 02/15/21  1755   SODIUM mmol/L 137 137 137 138 139 138   POTASSIUM mmol/L 5 0 4 5 5 2 5 0 5 1 5 0   CHLORIDE mmol/L 104 104 103 104 105 104   CO2 mmol/L 30 27 29 31 29 29   ANION GAP mmol/L 3* 6 5 3* 5 5   BUN mg/dL 44* 47* 44* 41* 41* 46*   CREATININE mg/dL 1 00 1 09 1 01 0 89 1 04 1 24   CALCIUM mg/dL 8 6 8 6 8 9 9 0 8 5 8 3   GLUCOSE RANDOM mg/dL 126 169* 108 96 184* 181*   ALT U/L 133*  --   --   --   --   --    AST U/L 45  --   --   --   --   --    ALK PHOS U/L 125*  --   --   --   --   --    ALBUMIN g/dL 2 4*  --   --   --   --   --    TOTAL BILIRUBIN mg/dL 1 87*  --   --   --   --   --      Results from last 7 days   Lab Units 02/22/21 0416 02/21/21  0819 02/20/21  0429 02/16/21  0506   MAGNESIUM mg/dL 2 3 2 3 2 4 2 5   PHOSPHORUS mg/dL 3 6 3 5  --  2 9                   ABG:    VBG:          Micro        EKG: no new  Imaging: I have personally reviewed pertinent reports  Intake and Output  I/O       02/20 0701 - 02/21 0700 02/21 0701 - 02/22 0700    P  O  820 360    Total Intake(mL/kg) 820 (8 9) 360 (3 9)    Urine (mL/kg/hr) 1175 (0 5) 1000 (0 4)    Stool 0 0    Total Output 1175 1000    Net -355 -640          Unmeasured Stool Occurrence 0 x 0 x        Height and Weights      IBW: -88 kg  Body mass index is 26 93 kg/m²    Weight (last 2 days)     Date/Time   Weight    02/21/21 0600   92 6 (204 15)    02/20/21 0600   92 8 (204 59)    02/20/21 0441   92 8 (204 59)                Nutrition       Diet Orders   (From admission, onward)             Start     Ordered    02/17/21 1522  Dietary nutrition supplements  Once     Question Answer Comment   Select Supplement: Magic Cup Cb    Frequency Lunch        02/17/21 1521    02/11/21 1339  Dietary nutrition supplements  Once     Question Answer Comment   Select Supplement: Ensure Enlive-Vanilla    Frequency Breakfast, Dinner        02/11/21 1338    02/05/21 2218  Room Service  Once     Question:  Type of Service  Answer:  Room Service - Appropriate with Assistance    02/05/21 2218 02/05/21 1722  Diet Cardiovascular; Cardiac  Diet effective now     Question Answer Comment   Diet Type Cardiovascular    Cardiac Cardiac    RD to adjust diet per protocol?  Yes        02/05/21 1721                Active Medications  Scheduled Meds:  Current Facility-Administered Medications   Medication Dose Route Frequency Provider Last Rate    atorvastatin  40 mg Oral HS Tell[place] FortKATHIE juárez      benzonatate  100 mg Oral TID PRN Tellis Fortmarquita, PA-VICKEY      cholecalciferol  2,000 Units Oral Daily Tellis Fortmarquita, KATHIE      enoxaparin  1 mg/kg Subcutaneous Q12H [address] Silvio Roe PA-C      famotidine  20 mg Oral BID Telljohnson Gary PA-C      melatonin  6 mg Oral HS Sravan Sheth MD      metoprolol tartrate  25 mg Oral Q12H [address] Gege Camilo MD      multivitamin-minerals  1 tablet Oral Daily Telljohnsno FortKATHIE juárez      OLANZapine  2 5 mg Oral HS Linda Edmondson DO      ondansetron  4 mg Intravenous Q6H PRN Telljohnson Gary PA-C      polyethylene glycol  17 g Oral Daily PRN Gege Camilo MD      [START ON 3/1/2021] predniSONE  10 mg Oral Daily With Breakfast [place], CRNP      [START ON 2/25/2021] predniSONE  30 mg Oral Daily With Breakfast [place], CRNP      Followed by   Matt Catherine ON 2/27/2021] predniSONE  20 mg Oral Daily With Breakfast [place], CRNP      [START ON 2/23/2021] predniSONE  40 mg Oral Daily With Breakfast [place], CRNP      senna-docusate sodium  1 tablet Oral BID Tyra Gary PA-C      tamsulosin  0 4 mg Oral Daily Cathi Roe PA-C       Continuous Infusions:     PRN Meds:   benzonatate, 100 mg, TID PRN  ondansetron, 4 mg, Q6H PRN  polyethylene glycol, 17 g, Daily PRN        Allergies   No Known Allergies  ---------------------------------------------------------------------------------------  Advance Directive and Living Will:      Power of :    POLST:    ---------------------------------------------------------------------------------------  Care Time Delivered:   No Critical Care time spent     Luz Marina Chirinos DO      Portions of the record may have been created with voice recognition software  Occasional wrong word or "sound a like" substitutions may have occurred due to the inherent limitations of voice recognition software    Read the chart carefully and recognize, using context, where substitutions have occurred

## 2021-02-23 PROBLEM — R07.9 CHEST PAIN: Status: ACTIVE | Noted: 2021-01-01

## 2021-02-23 NOTE — ASSESSMENT & PLAN NOTE
Malnutrition Findings:   Adult Malnutrition type: Acute illness  Adult Degree of Malnutrition: Malnutrition of moderate degree(related to acute medical condition, inadequate energy intake)    BMI Findings: Body mass index is 26 76 kg/m²     · Continue with diet recommendations per nutrition

## 2021-02-23 NOTE — OCCUPATIONAL THERAPY NOTE
OccupationalTherapy Progress Note     Patient Name: Gabi Baca  BCUUF'R Date: 2/23/2021  Problem List  Principal Problem:    Pneumonia due to COVID-19 virus  Active Problems:    Coagulopathy ([facility] [address] )    Acute respiratory failure with hypoxia (HCC)    Chronic atrial fibrillation ([facility] )    Delirium due to general medical condition    Leukocytosis    Weakness    Weight loss    Moderate protein-calorie malnutrition (HCC)    Hyperlipidemia    Elevated d-dimer    Chest pain       02/23/21 1055   OT Last Visit   OT Visit Date 02/23/21  (Tuesday)   Note Type   Note Type Treatment   Cancel Reasons Medical status   Activity Tolerance   Medical Staff Made Aware spoke to PTRaymond   Assessment   Assessment Chart review completed  Attempted to see pt for OT tx session  Pt currently not appropriate to participate due to medical status   Pt currently intubated and code blue/ crimson called this AM  Will continue to follow as appropriate and schedule allows      [facility], OTR/L

## 2021-02-23 NOTE — ASSESSMENT & PLAN NOTE
· Continue severe pathway treatment protocol  ? On [address] 15L, off NRB, maintaining spo2 of low 90's  ? Antibiotics discontinued due to negative procal x 2  ? Completed course of Remdesivir   ? Continue steroids taper, started on 60 mg on 2/19 of prednisone, will decrease by 10 mg every 2 days   ? Actemra 2/14  ? Continue lovenox per COVID protocol   ? Encourage self-proning and incentive spirometry  ? Trend CRP, D-Dimer, Ferritin q48hr  ?  Wean O2 to maintain saturations > 88-90%

## 2021-02-23 NOTE — PHYSICIAN ADVISOR
Current patient class: Inpatient  The patient is currently on Hospital Day: 23      The patient was admitted to the hospital at [phone] on 2/5/21 for the following diagnosis:  Weakness [R53 1]  Dyspnea [R06 00]  Elevated troponin [R77 8]  Pneumonia due to COVID-19 virus [U07 1, J12 82]  COVID-19 [U07 1]     CMS OUTLIER STAY REVIEW    After review of the relevant documentation, labs, vital signs and test results, the patient is appropriate for CONTINUED INPATIENT ADMISSION  The patient continues to remain hospitalized receiving acute medical care  The patient has surpassed the expected duration of stay, however given the clinical condition, need for further acute care management, the patient is appropriate to remain in an inpatient status  The patient still being actively managed, and does have unresolved medical issues requiring further hospitalization  This review is conducted at 20 days, to help satisfy the requirements for significant outlier stay review as per CMS  Given the current condition of this patient, the patient satisfies this review was determination for continued inpatient stay  Rationale is as follows: The patient is hospitalized with severe COVID-19 pneumonia  He had been tolerating mid flow nasal cannula this morning  Today he complained of acute left-sided chest pain  Chest x-ray showed no clear evidence of pneumothorax  The patient developed progressive hypoxia requiring intubation  The patient also had hypotension  requiring pressors  The patient is critically ill and requires continued active management in the intensive care unit  His hospital stay will exceed 20 days      The patients vitals on arrival were   ED Triage Vitals   Temperature Pulse Respirations Blood Pressure SpO2   02/05/21 1129 02/05/21 1108 02/05/21 1108 02/05/21 1108 02/05/21 1108   100 1 °F (37 8 °C) 101 20 130/79 (!) 84 %      Temp Source Heart Rate Source Patient Position - Orthostatic VS BP Location FiO2 (%)   02/05/21 1129 02/05/21 1108 02/05/21 1330 02/05/21 1330 02/06/21 0357   Oral Monitor Sitting Left arm 100      Pain Score       02/05/21 1330       No Pain           Past Medical History:   Diagnosis Date    Irregular heart beat      History reviewed  No pertinent surgical history  Consults have been placed to:   IP CONSULT TO NUTRITION SERVICES  IP CONSULT TO PHARMACY    Vitals:    02/23/21 1130 02/23/21 1132 02/23/21 1145 02/23/21 1200   BP: (!) 88/57 (!) 83/55 (!) 63/41 143/54   BP Location:       Pulse: (!) 122 (!) 124 (!) 114 (!) 106   Resp:  (!) 34 (!) 29 (!) 106   Temp:   99 7 °F (37 6 °C) 99 7 °F (37 6 °C)   TempSrc:       SpO2:  (!) 59% (!) 54% (!) 16%   Weight:           Most recent labs:    Recent Labs     02/23/21  0735  02/23/21  1039 02/23/21  1103   WBC  --    < > 9 57  --    HGB  --    < > 13 4 12 2   HCT  --    < > 43 0 36*   PLT  --    < > 160  --    K 4 4  --   --   --    CALCIUM 8 8  --   --   --    BUN 53*  --   --   --    CREATININE 1 42*  --   --   --    INR 1 10  --   --   --    TROPONINI 0 17*  --   --   --    AST 41  --   --   --    *  --   --   --    ALKPHOS 151*  --   --   --     < > = values in this interval not displayed         Scheduled Meds:  Current Facility-Administered Medications   Medication Dose Route Frequency Provider Last Rate    benzonatate  100 mg Oral TID PRN Lakshmi Torres PA-C      cefepime  2,000 mg Intravenous Q12H Sharlene Ellison DO      cholecalciferol  2,000 Units Oral Daily Lakshmi Torres PA-C      enoxaparin  1 mg/kg Subcutaneous Q12H [facility] Lakshmi Torres PA-C      epinephrine  1-10 mcg/min Intravenous Titrated Sharlene Ellison DO Stopped (02/23/21 1205)    famotidine  20 mg Oral BID Philippe Roe PA-C      hydrocortisone sodium succinate  100 mg Intravenous Q8H [address] [place] [address], DO      lidocaine  1 patch Topical Daily SKINNY Law      melatonin  6 mg Oral HS Angela Herrera MD      metoprolol tartrate  25 mg Oral Q12H [address] Marc Giang MD      metroNIDAZOLE  500 mg Intravenous Q8H Lin Ross DO      multivitamin-minerals  1 tablet Oral Daily Aida Dowd PA-C      norepinephrine  1-30 mcg/min Intravenous Titrated Emma Early PA-C Stopped (02/23/21 1205)    OLANZapine  5 mg Oral HS Lin Ross,       OLANZapine  2 5 mg Intramuscular Once Lin Ross DO      ondansetron  4 mg Intravenous Q6H PRN Aida Dowd PA-C      phenylephine   mcg/min Intravenous Titrated Lin Ross, DO Stopped (02/23/21 1205)    phenylephrine HCl           phenylephrine HCl           phenylephrine HCl           polyethylene glycol  17 g Oral Daily PRN Marc Giang MD      [START ON 3/1/2021] predniSONE  10 mg Oral Daily With Breakfast Dortha Drown, SKINNY      [START ON 2/25/2021] predniSONE  30 mg Oral Daily With Breakfast Dortha Drown, CRNP      Followed by   Azeem Schmitt ON 2/27/2021] predniSONE  20 mg Oral Daily With Breakfast Dortha Drown, CRNP      predniSONE  40 mg Oral Daily With Breakfast Dortha Drown, CRNP      senna-docusate sodium  1 tablet Oral BID Aida Dowd PA-C      sodium bicarbonate infusion  100 mL/hr Intravenous Continuous Lin Ross DO Stopped (02/23/21 1205)    sodium bicarbonate           sodium bicarbonate           sterile water           tamsulosin  0 4 mg Oral Daily Jorge Roe PA-C      vancomycin  15 mg/kg Intravenous Daily PRN Lin Ross DO      vancomycin  20 mg/kg Intravenous Once Lin Ross DO      vasopressin  0 04 Units/min Intravenous Continuous SKINNY Rivero Stopped (02/23/21 1205)     Continuous Infusions:epinephrine, 1-10 mcg/min, Last Rate: Stopped (02/23/21 1205)  norepinephrine, 1-30 mcg/min, Last Rate: Stopped (02/23/21 1205)  phenylephine,  mcg/min, Last Rate: Stopped (02/23/21 1205)  sodium bicarbonate infusion, 100 mL/hr, Last Rate: Stopped (02/23/21 1205)  vasopressin, 0 04 Units/min, Last Rate: Stopped (02/23/21 1205)      PRN Meds:   benzonatate    ondansetron    polyethylene glycol    vancomycin    Surgical procedures (if appropriate):

## 2021-02-23 NOTE — ASSESSMENT & PLAN NOTE
· WBC trending up 19 94  · Lines: peripheral IVs  · CXR 2/5 - Suspected bilateral Covid pneumonia  · Physical exam unchanged, denies fever/chills/pain  · Likely 2/2 steroids, was recntly transitioned to oral prednisone  · Plan  · Continue to monitor am cbc q48h

## 2021-02-23 NOTE — NURSING NOTE
Called to room by PCA  Patient crying in pain  He states he has chest pain 10/10  "I feel like its under my ribs " CC AP notified  Orders received for fentanyl, troponin, and chest xray  12 lead EKG obtained  Vital signs WDL  Will continue to monitor   Sukhjinder Parra RN

## 2021-02-23 NOTE — PROGRESS NOTES
Progress Note - Ольга Nicolas 1945, 70 y o  male MRN: [id number]    Unit/Bed#: ICU 11 Encounter: [id number]    Primary Care Provider: No primary care provider on file  Date and time admitted to hospital: 2/5/2021 11:04 AM  Chest pain  Assessment & Plan  · Lateral chest on the left  · Sharp, pleuritic, reproducible  · Constant  · EKG unchanged from admission  · Trop 0 17, repeat pending  · Lidocaine patch  · Consider CTA-PE study though D-dimer was decreased yesterday and patient has been on therapeutic lovenox  · CBC, BMP, Lactate pending    Elevated d-dimer  Assessment & Plan  · D-dimer 2 62, trending down  · LE dopplers negative  · Continue to monitor d-dimer q48h  · Continue with therapeutic lovenox    Hyperlipidemia  Assessment & Plan  · Continue statin    Moderate protein-calorie malnutrition (HCC)  Assessment & Plan  Malnutrition Findings:   Adult Malnutrition type: Acute illness  Adult Degree of Malnutrition: Malnutrition of moderate degree(related to acute medical condition, inadequate energy intake)    BMI Findings: Body mass index is 26 76 kg/m²     · Continue with diet recommendations per nutrition    Weight loss  Assessment & Plan  · Total loss of approximately 40Lb since 11/2020 and 18Lb since admission  · Suspected 2/2 to poor diet, Nutrition consult placed, currently on strict calorie counting diet  · Will need f/u outpatient with PCP for further evaluation    Weakness  Assessment & Plan  · PT/OT consulted  · They will continue to follow inpatient, and they recommend post acute rehab at this time    Leukocytosis  Assessment & Plan  · WBC trending up 19 94  · Lines: peripheral IVs  · CXR 2/5 - Suspected bilateral Covid pneumonia  · Physical exam unchanged, denies fever/chills/pain  · Likely 2/2 steroids, was recntly transitioned to oral prednisone  · Plan  · Continue to monitor am cbc q48h    Delirium due to general medical condition  Assessment & Plan  · Continue melatonin hs  · Xyprexa 5mg qhs  · Continue to monitor     Chronic atrial fibrillation (HCC)  Assessment & Plan  · Continue Metoprolol 25mg BID for rate control  · HR 70-80s  · Coumadin on hold on admission for supratherapeutic INR, continue therapeutic lovenox for now per COVID protocol       Acute respiratory failure with hypoxia ([facility] [address] )  Assessment & Plan  · Secondary to COVID PNA  · Continue treatment per protocol  · Continue HFNC +/- NRB to maintain SpO2 >88%   · OOB as tolerated   · Encourage self-proning     Co[facility] ([facility]  Assessment & Plan  · Patient was on coumadin prior to hospitalization  · LE duplex bilateral was negative  · Transitioned to lovenox  · D-dimer trending down    * Pneumonia due to COVID-19 virus  Assessment & Plan  · Continue severe pathway treatment protocol  ? On [address] 15L, off NRB, maintaining spo2 of low 90's  ? Antibiotics discontinued due to negative procal x 2  ? Completed course of Remdesivir   ? Continue steroids taper, started on 60 mg on 2/19 of prednisone, will decrease by 10 mg every 2 days   ? Actemra 2/14  ? Continue lovenox per COVID protocol   ? Encourage self-proning and incentive spirometry  ? Trend CRP, D-Dimer, Ferritin q48hr  ? Wean O2 to maintain saturations > 88-90%      ----------------------------------------------------------------------------------------  HPI/24hr events: around 5am he woke up very agitated and yelling  He c/o left sided chest pain, constant, sharp, worse with breathing and worse when he pushes on it  EKG was unchanged from previous with mild ST changes  Trop was 0 17  e was given a lidocaine patch and 50 of fentanyl  When he appeared to be hallucinating and delirious, he received a 2 5mg IM zyprexa  His oxygen saturations were low 80's on [address] 15L with NRB      ---------------------------------------------------------------------------------------  SUBJECTIVE  Patient is lying in bed, holding his hand over his left lateral chest reporting constant chest pain   He says it is worse when he or I push on the area of pain and that it is worse with breathing  He then reports that no one comes to see him, that he's stuck in here, and no one will let him out  He appears delirious at times, asking me to put him down, "i'm floating in the air " He says that no one has talked to him in a week and that he's been in this room all alone  He complains that he is very cold and has chills  Review of Systems   Constitutional: Positive for chills  Negative for appetite change, fatigue and fever  Respiratory: Positive for shortness of breath  Negative for cough, chest tightness and wheezing  Cardiovascular: Negative for palpitations and leg swelling  Gastrointestinal: Negative for abdominal pain, constipation, diarrhea, nausea and vomiting  Genitourinary: Negative for difficulty urinating, dysuria, frequency and hematuria  Skin: Negative for color change and rash  Neurological: Positive for seizures and weakness  Review of systems was reviewed and negative unless stated above in HPI/24-hour events   ---------------------------------------------------------------------------------------  Disposition: Transfer to Stepdown Level 2  Code Status: Level 1 - Full Code  ---------------------------------------------------------------------------------------  ICU CORE MEASURES    Prophylaxis   VTE Pharmacologic Prophylaxis: Enoxaparin (Lovenox)  VTE Mechanical Prophylaxis: sequential compression device  Stress Ulcer Prophylaxis: Famotidine PO    ABCDE Protocol (if indicated)  Plan to perform spontaneous awakening trial today? Not applicable  Plan to perform spontaneous breathing trial today? Not applicable  Obvious barriers to extubation? Not applicable  CAM-ICU: n/a    Invasive Devices Review  Invasive Devices     Peripheral Intravenous Line            Peripheral IV 02/20/21 Right;Dorsal (posterior) Hand 3 days              Can any invasive devices be discontinued today? No  ---------------------------------------------------------------------------------------  OBJECTIVE    Vitals   Vitals:    21 0510 21 0712 21 0713 21 0719   BP: 98/76 104/67     BP Location:       Pulse:  (!) 122     Resp:  (!) 50     Temp:  98 7 °F (37 1 °C) (!) 96 8 °F (36 °C) 98 4 °F (36 9 °C)   TempSrc:  Temporal Axillary Rectal   SpO2: 92% (!) 84%     Weight:         Temp (24hrs), Av 7 °F (36 5 °C), Min:96 8 °F (36 °C), Max:98 7 °F (37 1 °C)  Current: Temperature: 98 4 °F (36 9 °C)  HR: 110  BP: 104/67  RR: 35  SpO2: 93    Respiratory:  SpO2: SpO2: (!) 84 %, SpO2 Activity: SpO2 Activity: At Rest, SpO2 Device: O2 Device: High flow nasal cannula(non-rebreather)  Nasal Cannula O2 Flow Rate (L/min): 12 L/min    Invasive/non-invasive ventilation settings   Respiratory    Lab Data (Last 4 hours)    None         O2/Vent Data (Last 4 hours)       0413          Non-Invasive Ventilation Mode [address] (Mid flow)                   Physical Exam  Constitutional:       General: He is in acute distress  HENT:      Head: Normocephalic and atraumatic  Nose: Nose normal       Comments: [address]      Mouth/Throat:      Mouth: Mucous membranes are moist       Pharynx: Oropharynx is clear  Eyes:      Extraocular Movements: Extraocular movements intact  Conjunctiva/sclera: Conjunctivae normal    Cardiovascular:      Rate and Rhythm: Tachycardia present  Rhythm irregular  Pulses: Normal pulses  Heart sounds: Normal heart sounds  Pulmonary:      Breath sounds: Normal breath sounds  No wheezing or rhonchi  Comments: Increased effort with tachypneia  Abdominal:      General: Abdomen is flat  Bowel sounds are normal  There is no distension  Palpations: Abdomen is soft  Tenderness: There is no abdominal tenderness  There is no guarding or rebound  Musculoskeletal:      Right lower leg: No edema  Left lower leg: No edema     Skin:     Capillary Refill: Capillary refill takes less than 2 seconds  Coloration: Skin is not pale  Findings: No erythema  Comments: Cool extremities   Neurological:      Mental Status: He is alert  He is disoriented  Motor: No weakness  Psychiatric:      Comments: Appears delirious with tactile hallucinations  Laboratory and Diagnostics:  Results from last 7 days   Lab Units 218   WBC Thousand/uL 19 94* 17 36* 11 51*   HEMOGLOBIN g/dL 17 1* 16 3 17 0   HEMATOCRIT % 51 1* 49 2 52 7*   PLATELETS Thousands/uL 167 168 135*   NEUTROS PCT %  --  91* 89*   BANDS PCT % 2  --   --    MONOS PCT %  --  3* 3*   MONO PCT % 1*  --   --      Results from last 7 days   Lab Units 21  0458   SODIUM mmol/L 137 137 137 138   POTASSIUM mmol/L 5 0 4 5 5 2 5 0   CHLORIDE mmol/L 104 104 103 104   CO2 mmol/L 30 27 29 31   ANION GAP mmol/L 3* 6 5 3*   BUN mg/dL 44* 47* 44* 41*   CREATININE mg/dL 1 00 1 09 1 01 0 89   CALCIUM mg/dL 8 6 8 6 8 9 9 0   GLUCOSE RANDOM mg/dL 126 169* 108 96   ALT U/L 133*  --   --   --    AST U/L 45  --   --   --    ALK PHOS U/L 125*  --   --   --    ALBUMIN g/dL 2 4*  --   --   --    TOTAL BILIRUBIN mg/dL 1 87*  --   --   --      Results from last 7 days   Lab Units 21   MAGNESIUM mg/dL 2 3 2 3 2 4   PHOSPHORUS mg/dL 3 6 3 5  --            Results from last 7 days   Lab Units 21  0514   TROPONIN I ng/mL 0 17*         ABG:    VBG:          Micro        EK/22 - Afib, anteroseptal infarct, ST and T wave abnormality, unchagned from previous EKG  Imaging: no new I have personally reviewed pertinent reports  Intake and Output  I/O       701 -  0700 701 -  07    P  O  360 900    Total Intake(mL/kg) 360 (3 9) 900 (9 8)    Urine (mL/kg/hr) 1100 (0 5) 850 (0 4)    Stool 0 0    Total Output 1100 850    Net -740 +50          Unmeasured Urine Occurrence  1 x Unmeasured Stool Occurrence 0 x 1 x        Height and Weights      IBW: -88 kg  Body mass index is 26 76 kg/m²  Weight (last 2 days)     Date/Time   Weight    02/23/21 0252   92 (202 82)    02/21/21 0600   92 6 (204 15)                Nutrition       Diet Orders   (From admission, onward)             Start     Ordered    02/17/21 1522  Dietary nutrition supplements  Once     Question Answer Comment   Select Supplement: Magic Cup Cb    Frequency Lunch        02/17/21 1521    02/11/21 1339  Dietary nutrition supplements  Once     Question Answer Comment   Select Supplement: Ensure Enlive-Vanilla    Frequency Breakfast, Dinner        02/11/21 1338    02/05/21 2218  Room Service  Once     Question:  Type of Service  Answer:  Room Service - Appropriate with Assistance    02/05/21 2218 02/05/21 1722  Diet Cardiovascular; Cardiac  Diet effective now     Question Answer Comment   Diet Type Cardiovascular    Cardiac Cardiac    RD to adjust diet per protocol?  Yes        02/05/21 1721              Active Medications  Scheduled Meds:  Current Facility-Administered Medications   Medication Dose Route Frequency Provider Last Rate    atorvastatin  40 mg Oral HS Merari Lira PA-C      benzonatate  100 mg Oral TID PRN Merari Lira PA-C      cholecalciferol  2,000 Units Oral Daily Merari Lira PA-C      enoxaparin  1 mg/kg Subcutaneous Q12H [address] Saúl Roe PA-C      famotidine  20 mg Oral BID Merari Lira PA-C      lidocaine  1 patch Topical Daily [facility] SKINNY CRENSHAW      melatonin  6 mg Oral HS James Segovia MD      metoprolol tartrate  25 mg Oral Q12H [address] Zhen Ivan MD      multivitamin-minerals  1 tablet Oral Daily Saúl Roe PA-C      OLANZapine  5 mg Oral HS Altamease Fahadch, DO      OLANZapine  2 5 mg Intramuscular Once Altamease Clyde, DO      ondansetron  4 mg Intravenous Q6H PRN Saúl Roe PA-C      polyethylene glycol  17 g Oral Daily PRN Tutu Beckwith MD      [START ON 3/1/2021] predniSONE  10 mg Oral Daily With Breakfast Garcia Cobia, SKINNY      [START ON 2/25/2021] predniSONE  30 mg Oral Daily With Breakfast Garcia CobiaSKINNY      Followed by   Kira Alicea ON 2/27/2021] predniSONE  20 mg Oral Daily With Breakfast Garcia Cobia, SKINNY      predniSONE  40 mg Oral Daily With Breakfast Garcia Cobia, SKINNY      senna-docusate sodium  1 tablet Oral BID Eloy Sotelo PA-C      tamsulosin  0 4 mg Oral Daily Cathi Roe PA-C       Continuous Infusions:     PRN Meds:   benzonatate, 100 mg, TID PRN  ondansetron, 4 mg, Q6H PRN  polyethylene glycol, 17 g, Daily PRN        Allergies   No Known Allergies  ---------------------------------------------------------------------------------------  Advance Directive and Living Will:      Power of :    POLST:    ---------------------------------------------------------------------------------------  Care Time Delivered:   No Critical Care time spent     Lexine Lights, DO      Portions of the record may have been created with voice recognition software  Occasional wrong word or "sound a like" substitutions may have occurred due to the inherent limitations of voice recognition software    Read the chart carefully and recognize, using context, where substitutions have occurred

## 2021-02-23 NOTE — PROCEDURES
Intubation    Date/Time: 2/23/2021 8:00 AM  Performed by: Micki Martin PA-C  Authorized by: Micki Martin PA-C     Patient location:  Bedside  Other Assisting Provider: Yes (comment) SKINNY Alvarez)    Consent:     Consent obtained:  Emergent situation  Universal protocol:     Relevant documents present and verified: yes      Test results available and properly labeled: yes      Radiology Images displayed and confirmed  If images not available, report reviewed: yes      Required blood products, implants, devices, and special equipment available: yes      Site marked: n/a        Immediately prior to procedure, a time out was called: yes      Patient identity confirmed:  Arm band  Pre-procedure details:     Patient status:  Altered mental status    Mallampati score:  2    Pretreatment medications:  Etomidate    Paralytics:  Succinylcholine  Indications:     Indications for intubation: respiratory failure    Procedure details:     Preoxygenation:  Bag valve mask    CPR in progress: no      Intubation method:  Oral    Oral intubation technique:  Glidescope    Tube size (mm):  7 5    Tube type:  Cuffed    Number of attempts:  2    Ventilation between attempts: yes      Cricoid pressure: no      Tube visualized through cords: yes    Placement assessment:     ETT to lip:  25cm     Tube secured with:  ETT wilder    Breath sounds:  Equal    Placement verification: chest rise, condensation, CXR verification, equal breath sounds, ETCO2 detector and tube exhalation      CXR findings:  ETT in proper place (Advanced 2mm )

## 2021-02-23 NOTE — PROGRESS NOTES
Pastoral Care Progress Note    2021  Patient: Rakesh Oswald : 1945  Admission Date & Time: 2021 1104  MRN: [id number] CSN: [id number]                     Chaplaincy Interventions Utilized:   Empowerment: Clarified, confirmed, or reviewed information from treatment team , Encouraged focus on present, Normalized experience of patient/family, Provided anxiety containment and Reframed experience of patient/family    Exploration: Explored emotional needs & resources, Facilitated expression of regret and Identified, evaluated & reinforced appropriate coping strategies    Collaboration: Advocated for patient/family and Consulted with interdisciplinary team    Relationship Building: Cultivated a relationship of care and support, Listened empathically and Provided silent and supportive presence    Ritual: Provided prayer            Chaplaincy Outcomes Achieved:  Debriefed/defused experience, Made decisions, Tearfully processed emotions and Verbally processed emotions          Spiritual Coping Strategies Utilized:   Spiritual practices and Spiritual comfort       21 1300   Clinical Encounter Type   Visited With Patient and family together   Crisis Visit Critical Care;Code;Death   Referral From Nurse   Mandaen Encounters   Mandaen Needs Prayer   Patient Spiritual Encounters   Spiritual Encounter Notes Patient rapidly declined over the course of several hours and both a codde blue and code thomasson were called   met the patient's family and was able to secure a medical update from the critical care team for them   escorted family back to patient's room and accompanied them as they visited with patient  Patient  shortly thereafter   continued to provide comfort and support  Family Spiritual Encounters   Family Coping Anxiety; Sadness

## 2021-02-23 NOTE — PROGRESS NOTES
Vancomycin Assessment    Moni Leblanc is a 70 y o  male who is currently receiving vancomycin 1500mg IV q12 hours for Pneumonia     Relevant clinical data and objective history reviewed:  Creatinine   Date Value Ref Range Status   02/23/2021 1 42 (H) 0 60 - 1 30 mg/dL Final     Comment:     Standardized to IDMS reference method   02/22/2021 1 00 0 60 - 1 30 mg/dL Final     Comment:     Standardized to IDMS reference method   02/21/2021 1 09 0 60 - 1 30 mg/dL Final     Comment:     Standardized to IDMS reference method     BP (!) 65/40   Pulse (!) 121   Temp 98 4 °F (36 9 °C) (Rectal)   Resp (!) 35   Wt 92 kg (202 lb 13 2 oz)   SpO2 94%   BMI 26 76 kg/m²   I/O last 3 completed shifts: In: 900 [P O :900]  Out: 1250 [Urine:1250]  Lab Results   Component Value Date/Time    BUN 53 (H) 02/23/2021 07:35 AM    WBC 9 40 02/23/2021 07:39 AM    HGB 17 6 (H) 02/23/2021 07:39 AM    HCT 54 9 (H) 02/23/2021 07:39 AM    MCV 92 02/23/2021 07:39 AM     02/23/2021 07:39 AM     Temp Readings from Last 3 Encounters:   02/23/21 98 4 °F (36 9 °C) (Rectal)   09/25/20 (!) 97 4 °F (36 3 °C) (Oral)   09/25/20 97 8 °F (36 6 °C) (Oral)     Vancomycin Days of Therapy: 1    Assessment/Plan  The patient is currently on vancomycin utilizing pulse dosing based on actual body weight  Baseline risks associated with therapy include: pre-existing renal impairment and advanced age  The patient is currently receiving 1500mg IV q12 hours and after clinical evaluation will be changed to 1750mg IV now, redose for level <20  Pharmacy will also follow closely for s/sx of nephrotoxicity, infusion reactions, and appropriateness of therapy  BMP and CBC will be ordered per protocol  Plan for level in approximately 24 hours at approximately 1000 on 2/24/2021  Due to infection severity, will target a trough of 15-20 (appropriate for most indications)     Pharmacy will continue to follow the patients culture results and clinical progress daily      Lori Lugo, Pharmacist

## 2021-02-23 NOTE — ASSESSMENT & PLAN NOTE
· Lateral chest on the left  · Sharp, pleuritic, reproducible  · Constant  · EKG unchanged from admission  · Trop 0 17, repeat pending  · Lidocaine patch  · Consider CTA-PE study though D-dimer was decreased yesterday and patient has been on therapeutic lovenox  · CBC, BMP, Lactate pending

## 2021-02-23 NOTE — PHYSICAL THERAPY NOTE
Physical Therapy Cancellation Note         02/23/21 1137   Note Type   Cancel Reasons Medical status   Assessment   Assessment Pt currently not appropriate for PT treatment due to medical status and code blue/code crimson called this AM   Will continue to follow and reassess as appropriate          Belén Chapa, PT,DPT

## 2021-02-23 NOTE — PROCEDURES
Central Line Insertion    Date/Time: 2/23/2021 12:11 PM  Performed by: SKINNY Acevedo  Authorized by: SKINNY Acevedo     Patient location:  ICU and bedside  Consent:     Consent obtained:  Emergent situation    Consent given by:  Patient  Universal protocol:     Relevant documents present and verified: yes      Test results available and properly labeled: yes      Radiology Images displayed and confirmed  If images not available, report reviewed: yes      Required blood products, implants, devices, and special equipment available: yes      Site/side marked: yes      Immediately prior to procedure, a time out was called: yes      Patient identity confirmed: Anonymous protocol, patient vented/unresponsive  Pre-procedure details:     Hand hygiene: Hand hygiene performed prior to insertion      Skin preparation:  ChloraPrep  Indications:     Central line indications: medications requiring central line, hemodynamic monitoring and no peripheral vascular access    Procedure details:     Location:  Right femoral    Vessel type: vein      Laterality:  Right    Approach: percutaneous technique used      Catheter type:  Triple lumen    Landmarks identified: yes      Ultrasound guidance: yes      Number of attempts:  1    Successful placement: yes    Post-procedure details:     Post-procedure:  Dressing applied and line sutured    Assessment:  Blood return through all ports    Patient tolerance of procedure:   Tolerated well, no immediate complications  Comments:      Emergent non-sterile  Femoral line placed during patient decompensation

## 2021-02-23 NOTE — DEATH NOTE
INPATIENT DEATH NOTE  Hilda Parrish 70 y o  male MRN: [id number]  Unit/Bed#: ICU 11 Encounter: [id number]    Date, Time and Cause of Death    Date of Death: 21  Time of Death: 12:05 PM  Preliminary Cause of Death: Septic shock ([facility] [address] )  Entered by: SKINNY Louis [EW1 1]     Attribution     EW1 1 Mahamed Del Castillo, 10 [facility] 21 14:58           Patient's Information  Date of Death: 21  Time of Death: 1205  Pronounced by: Dr Lyndon Sicard  Did the patient's death occur in the ED?: No  Did the patient's death occur in the OR?: No  Did the patient's death occur less than 10 days post-op?: No  Did the patient's death occur within 24 hours of admission?: No  Was code status DNR at the time of death?: No    PHYSICAL EXAM:  Unresponsive to noxious stimuli, Spontaneous respirations absent, Breath sounds absent, Carotid pulse absent, Heart sounds absent, Pupillary light reflex absent and Corneal blink reflex absent    Medical Examiner notification criteria:  NONE APPLICABLE   Medical Examiner's office notified?:  Yes   Medical Examiner accepted case?:  No  Name of Medical Examiner: Marylu Hendrix Notification  Was the family notified?: Yes  Date Notified: 21  Time Notified: 9739  Notified by: Dr Lyndon Sicard  Name of Family Notified of Death: Mc Hastings   Relationship to Patient: Son, Daughter  Family Notification Route: At bedside  Was the family told to contact a  home?: Yes  Name of  Home[de-identified] [id number] [facility]    Autopsy Options:  Decision for post-mortem examination not yet made by next of kin      Primary Service Attending Physician notified?:  yes - Attending:  Gal Nieves MD    Physician/Resident responsible for completing Discharge Summary:  SKINNY Louis

## 2021-02-23 NOTE — QUICK NOTE
Patient with complaints of acute onset L sided chest pain  Sharp in nature non-radiating, no relieving factors  Pain does not change with inspiration but is worse with palpation  CXR stable from previous  EKG with atrial fibrillation, mild diffuse ST depressions unchanged from previous  Troponin 0 17  Suspect that the pain is musculoskeletal and not cardiac in nature  Patient NV intact  Patient given 50mcg IV fentanyl x2 with resolution of the pain

## 2021-02-23 NOTE — DISCHARGE SUMMARY
Discharge Summary - Soledad Johnson 70 y o  male MRN: [id number]    Unit/Bed#: ICU 11 Encounter: [id number] PCP: No primary care provider on file  Admission Date:   Admission Orders (From admission, onward)     Ordered        02/05/21 1517  Inpatient Admission  Once                     Admitting Diagnosis: Weakness [R53 1]  Dyspnea [R06 00]  Elevated troponin [R77 8]  Pneumonia due to COVID-19 virus [U07 1, J12 82]  COVID-19 [U07 1]    HPI: Soledad Johnson is a 70 y o  male with past medical history significant for chronic atrial fibrillation on anticoagulation with Coumadin, hyperlipidemia, GERD presents to the ED for evaluation of shortness of breath and weakness for the last few days  Patient reports since has been getting worse, prompting ER evaluation  He admits to fatigue/weakness, worsening shortness of breath  Patient has baseline shortness of breath secondary to Occupational Health exposure  Patient denies cough, nausea, vomiting, diarrhea, fevers  Note that patient's wife has similar symptoms is currently admitted to a different hospital   Patient denies any chest pain  He was noted to be hypoxic on arrival, responding well to oxygen  Procedures Performed:   Orders Placed This Encounter   Procedures   [facility]    ED ECG Documentation Only    Intubation       Summary of Hospital Course: The patient had increasing oxygent requirements after admission requiring transfer to the ICU on 2/6  He was placed on HFNC 100%/40L  He was started on severe COVID protocol, and received remdesivir and high dose dexamethasone  He received convalescent plasma on 2/8  He required precedex infusion for agitation and delirium, but this was quickly titrated off and he was started on zyprexa HS  He remained on HFNC with intermittent use of NRB  His ferritin remained elevated, and he received actemra on 2/15  On 2/18 hi decardron was weaned and he was able to be transitioned to prednisone taper   He continued to have intermittent episodes of delirium, for which he remained on HS zyprexa  HFNC was weaned, and the patient was transitioned to 12L midlflow on   On  he awoke complaining of L sided chest pain, reproducible on exam  EKG was unchanged, troponin mildly elevated, and he was given 50mcg fentanyl and lidocaine patch  He had progressive hypoxia, requiring HFNC and eventaully bipap  He was intubated for hypoxic respiratory failure  He became hypotensive, requiring levophed and IVF  Emergent femoral central line was placed  He was also started on vaso, shahnaz, and epi for refractory hypotension without improvement  FAST exam was performed and negative  Bedside US without evidence of pneumothorax or pericardial effusion  Despite IVF, 2 UPRBC, calcium, bicarb and maxium doses of vasopressors he continued to by hypotensive with BP 50s/30s  Unfortunately he was too unstable to travel to CT scan for further workup  Family was called to bedside  Repeat CXR and KUB were obtained  Shortly after patient suffered asystole arrest  CPR was started  He received Epi x3 bicarb, calcium  Despite all efforts, the patient remained asystolic  He was pronounced  at 80 by Dr Clarissa Davis       Significant Findings, Care, Treatment and Services Provided:    COVID +   transfer to ICU, HFNC   plasma, remdesivir, decadron  2/15 actemra   intubation, central line, arterial line, cardiac arrest       Complications: Cardiac arrest    Disposition:      Final Diagnosis: septic shock     Resolved Problems  Date Reviewed: 2021          Resolved    Generalized abdominal pain 2021     Resolved by  SKINNY Castillo              Date, Time and Cause of Death    Date of Death: 21  Time of Death: 12:05 PM  Preliminary Cause of Death: Septic shock ([facility] [address] )  Entered by: Leanna Rubinstein, CRNP [EW1 1]     Attribution     EW1 1 Leanna Rubinstein, CRNP 21 14:58          Death Note:    INPATIENT DEATH NOTE  Poncho Ramirez 70 y o  male MRN: [id number]  Unit/Bed#: ICU 6 Encounter: [id number]    Date, Time and Cause of Death    Date of Death: 21  Time of Death: 12:05 PM  Preliminary Cause of Death: Septic shock ([facility] [address] )  Entered by: SKINNY Bravo [EW1 1]     Attribution     EW1 1 Ro Hailey, [address] [facility] [address] 21 14:58           Patient's Information  Date of Death: 21  Time of Death: 1205  Pronounced by: Dr Fatimah Cullen  Did the patient's death occur in the ED?: No  Did the patient's death occur in the OR?: No  Did the patient's death occur less than 10 days post-op?: No  Did the patient's death occur within 24 hours of admission?: No  Was code status DNR at the time of death?: No    PHYSICAL EXAM:  Unresponsive to noxious stimuli, Spontaneous respirations absent, Breath sounds absent, Carotid pulse absent, Heart sounds absent, Pupillary light reflex absent and Corneal blink reflex absent    Medical Examiner notification criteria:  NONE APPLICABLE   Medical Examiner's office notified?:  Yes   Medical Examiner accepted case?:  No  Name of Medical Examiner: Robby Santiago Notification  Was the family notified?: Yes  Date Notified: 21  Time Notified: 9425  Notified by: Dr Fatimah Cullen  Name of Family Notified of Death: Katherine Perez   Relationship to Patient: Son, Daughter  Family Notification Route: At bedside  Was the family told to contact a  home?: Yes  Name of  Home[de-identified] [id number] [facility]    Autopsy Options:  Decision for post-mortem examination not yet made by next of kin      Primary Service Attending Physician notified?:  yes - Attending:  Selam Puentes MD    Physician/Resident responsible for completing Discharge Summary:  SKINNY Bravo

## 2021-02-24 LAB
ATRIAL RATE: 105 BPM
ATRIAL RATE: 108 BPM
ATRIAL RATE: 98 BPM
QRS AXIS: -16 DEGREES
QRS AXIS: -3 DEGREES
QRS AXIS: -4 DEGREES
QRSD INTERVAL: 70 MS
QRSD INTERVAL: 82 MS
QRSD INTERVAL: 86 MS
QT INTERVAL: 322 MS
QT INTERVAL: 378 MS
QT INTERVAL: 398 MS
QTC INTERVAL: 462 MS
QTC INTERVAL: 482 MS
QTC INTERVAL: 484 MS
T WAVE AXIS: 206 DEGREES
T WAVE AXIS: 207 DEGREES
T WAVE AXIS: 229 DEGREES
VENTRICULAR RATE: 124 BPM
VENTRICULAR RATE: 89 BPM
VENTRICULAR RATE: 98 BPM

## 2021-02-25 LAB
MRSA NOSE QL CULT: ABNORMAL
MRSA NOSE QL CULT: ABNORMAL

## 2021-02-26 LAB
BACTERIA BLD CULT: ABNORMAL
BACTERIA BLD CULT: ABNORMAL
GRAM STN SPEC: ABNORMAL
GRAM STN SPEC: ABNORMAL

## 2021-11-08 NOTE — ASSESSMENT & PLAN NOTE
Transposition Flap Text: The defect edges were debeveled with a #15 scalpel blade.  Given the location of the defect and the proximity to free margins a transposition flap was deemed most appropriate.  Using a sterile surgical marker, an appropriate transposition flap was drawn incorporating the defect.    The area thus outlined was incised deep to adipose tissue with a #15 scalpel blade.  The skin margins were undermined to an appropriate distance in all directions utilizing iris scissors. · Very mild pain  · Abdominal exam unremarkable  · Check CBC, CMP   · No change in bowel habits, N/V, fever or chills reported  · No urinary symptoms  · Continue to monitor today and see how he tolerates breakfast  Stage 5: Additional Anesthesia Type: 1% lidocaine with epinephrine Validate That Repair Assistants Are Chosen (Can Hide Repair Assistants In The Settings Tab): No Special Stains Stage 5 - Results: Base On Clearance Noted Above Otolaryngologist Procedure Text (A): After obtaining clear surgical margins the patient was sent to otolaryngology for surgical repair.  The patient understands they will receive post-surgical care and follow-up from the referring physician's office. Partial Purse String (Simple) Text: Given the location of the defect and the characteristics of the surrounding skin a simple purse string closure was deemed most appropriate.  Undermining was performed circumfirentially around the surgical defect.  A purse string suture was then placed and tightened. Wound tension only allowed a partial closure of the circular defect. Length To Time In Minutes Device Was In Place: 10 Cheek-To-Nose Interpolation Flap Text: A decision was made to reconstruct the defect utilizing an interpolation axial flap and a staged reconstruction.  A telfa template was made of the defect.  This telfa template was then used to outline the Cheek-To-Nose Interpolation flap.  The donor area for the pedicle flap was then injected with anesthesia.  The flap was excised through the skin and subcutaneous tissue down to the layer of the underlying musculature.  The interpolation flap was carefully excised within this deep plane to maintain its blood supply.  The edges of the donor site were undermined.   The donor site was closed in a primary fashion.  The pedicle was then rotated into position and sutured.  Once the tube was sutured into place, adequate blood supply was confirmed with blanching and refill.  The pedicle was then wrapped with xeroform gauze and dressed appropriately with a telfa and gauze bandage to ensure continued blood supply and protect the attached pedicle. Plastic Surgeon Procedure Text (D): After obtaining clear surgical margins the patient was sent to plastics for surgical repair.  The patient understands they will receive post-surgical care and follow-up from the referring physician's office. 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 Stage 11: Number Of Blocks?: 0 Crescentic Complex Repair Preamble Text (Leave Blank If You Do Not Want): Extensive wide undermining was performed. Unna Boot Text: An Unna boot was placed to help immobilize the limb and facilitate more rapid healing. Alternatives Discussed Intro (Do Not Add Period): I discussed alternative treatments to Mohs surgery and specifically discussed the risks and benefits of Mid-Level Procedure Text (C): After obtaining clear surgical margins the patient was sent to a mid-level provider for surgical repair.  The patient understands they will receive post-surgical care and follow-up from the mid-level provider. 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 Bilobed Flap Text: The defect edges were debeveled with a #15 scalpel blade.  Given the location of the defect and the proximity to free margins a bilobe flap was deemed most appropriate.  Using a sterile surgical marker, an appropriate bilobe flap drawn around the defect.    The area thus outlined was incised deep to adipose tissue with a #15 scalpel blade.  The skin margins were undermined to an appropriate distance in all directions utilizing iris scissors. Helical Rim Text: The closure involved the helical rim. Anesthesia Type: 1% lidocaine with 1:100,000 epinephrine and 408mcg clindamycin/ml Asc Procedure Text (B): After obtaining clear surgical margins the patient was sent to an ASC for surgical repair.  The patient understands they will receive post-surgical care and follow-up from the ASC physician. Rotation Flap Text: The defect edges were debeveled with a #15 scalpel blade.  Given the location of the defect, shape of the defect and the proximity to free margins a rotation flap was deemed most appropriate.  Using a sterile surgical marker, an appropriate rotation flap was drawn incorporating the defect and placing the expected incisions within the relaxed skin tension lines where possible.    The area thus outlined was incised deep to adipose tissue with a #15 scalpel blade.  The skin margins were undermined to an appropriate distance in all directions utilizing iris scissors. Graft Cartilage Fenestration Text: The cartilage was fenestrated with a 2mm punch biopsy to help facilitate graft survival and healing. Post-Care Instructions: I reviewed with the patient in detail post-care instructions. Patient is not to engage in any heavy lifting, exercise, or swimming for the next 14 days. Should the patient develop any fevers, chills, bleeding, severe pain patient will contact the office immediately. Oculoplastic Surgeon Procedure Text (F): After obtaining clear surgical margins the patient was sent to oculoplastics for surgical repair.  The patient understands they will receive post-surgical care and follow-up from the referring physician's office. Retention Suture Bite Size: 3 mm Mercedes Flap Text: The defect edges were debeveled with a #15 scalpel blade.  Given the location of the defect, shape of the defect and the proximity to free margins a Mercedes flap was deemed most appropriate.  Using a sterile surgical marker, an appropriate advancement flap was drawn incorporating the defect and placing the expected incisions within the relaxed skin tension lines where possible. The area thus outlined was incised deep to adipose tissue with a #15 scalpel blade.  The skin margins were undermined to an appropriate distance in all directions utilizing iris scissors. Undermining Type: Entire Wound Depth Of Tumor Invasion (For Histology): dermis Closure 3 Information: This tab is for additional flaps and grafts above and beyond our usual structured repairs.  Please note if you enter information here it will not currently bill and you will need to add the billing information manually. Mohs Rapid Report Verbiage: The area of clinically evident tumor was marked with skin marking ink and appropriately hatched.  The initial incision was made following the Mohs approach through the skin.  The specimen was taken to the lab, divided into the necessary number of pieces, chromacoded and processed according to the Mohs protocol.  This was repeated in successive stages until a tumor free defect was achieved. Consent Type: Consent 1 (Standard) Medical Necessity Statement: Based on my medical judgement, Mohs surgery is the most appropriate treatment for this cancer compared to other treatments. Posterior Auricular Interpolation Flap Text: A decision was made to reconstruct the defect utilizing an interpolation axial flap and a staged reconstruction.  A telfa template was made of the defect.  This telfa template was then used to outline the posterior auricular interpolation flap.  The donor area for the pedicle flap was then injected with anesthesia.  The flap was excised through the skin and subcutaneous tissue down to the layer of the underlying musculature.  The pedicle flap was carefully excised within this deep plane to maintain its blood supply.  The edges of the donor site were undermined.   The donor site was closed in a primary fashion.  The pedicle was then rotated into position and sutured.  Once the tube was sutured into place, adequate blood supply was confirmed with blanching and refill.  The pedicle was then wrapped with xeroform gauze and dressed appropriately with a telfa and gauze bandage to ensure continued blood supply and protect the attached pedicle. Number Of Hemigard Strips Per Side: 1 H Plasty Text: Given the location of the defect, shape of the defect and the proximity to free margins a H-plasty was deemed most appropriate for repair.  Using a sterile surgical marker, the appropriate advancement arms of the H-plasty were drawn incorporating the defect and placing the expected incisions within the relaxed skin tension lines where possible. The area thus outlined was incised deep to adipose tissue with a #15 scalpel blade. The skin margins were undermined to an appropriate distance in all directions utilizing iris scissors.  The opposing advancement arms were then advanced into place in opposite direction and anchored with interrupted buried subcutaneous sutures. Repair Hemostasis (Optional): Electrodesiccation Where Do You Want The Question To Include Opioid Counseling Located?: Case Summary Tab Hatchet Flap Text: The defect edges were debeveled with a #15 scalpel blade.  Given the location of the defect, shape of the defect and the proximity to free margins a hatchet flap was deemed most appropriate.  Using a sterile surgical marker, an appropriate hatchet flap was drawn incorporating the defect and placing the expected incisions within the relaxed skin tension lines where possible.    The area thus outlined was incised deep to adipose tissue with a #15 scalpel blade.  The skin margins were undermined to an appropriate distance in all directions utilizing iris scissors. Scc Spindle Histology Text: Glassy eosinophilic atypical keratinizing cells with surrounding inflammatory infiltrate Ftsg Text: The defect edges were debeveled with a #15 scalpel blade.  Given the location of the defect, shape of the defect and the proximity to free margins a full thickness skin graft was deemed most appropriate.  Using a sterile surgical marker, the primary defect shape was transferred to the donor site. The area thus outlined was incised deep to adipose tissue with a #15 scalpel blade.  The harvested graft was then trimmed of adipose tissue until only dermis and epidermis was left.  The skin margins of the secondary defect were undermined to an appropriate distance in all directions utilizing iris scissors.  The secondary defect was closed with interrupted buried subcutaneous sutures.  The skin edges were then re-apposed with running  sutures.  The skin graft was then placed in the primary defect and oriented appropriately. Mohs Method Verbiage: An incision at a 45 degree angle following the standard Mohs approach was done and the specimen was harvested as a microscopic controlled layer. Nasalis-Muscle-Based Myocutaneous Island Pedicle Flap Text: Using a #15 blade, an incision was made around the donor flap to the level of the nasalis muscle. Wide lateral undermining was then performed in both the subcutaneous plane above the nasalis muscle, and in a submuscular plane just above periosteum. This allowed the formation of a free nasalis muscle axial pedicle (based on the angular artery) which was still attached to the actual cutaneous flap, increasing its mobility and vascular viability. Hemostasis was obtained with pinpoint electrocoagulation. The flap was mobilized into position and the pivotal anchor points positioned and stabilized with buried interrupted sutures. Subcutaneous and dermal tissues were closed in a multilayered fashion with sutures. Tissue redundancies were excised, and the epidermal edges were apposed without significant tension and sutured with sutures. Crescentic Advancement Flap Text: The defect edges were debeveled with a #15 scalpel blade.  Given the location of the defect and the proximity to free margins a crescentic advancement flap was deemed most appropriate.  Using a sterile surgical marker, the appropriate advancement flap was drawn incorporating the defect and placing the expected incisions within the relaxed skin tension lines where possible.    The area thus outlined was incised deep to adipose tissue with a #15 scalpel blade.  The skin margins were undermined to an appropriate distance in all directions utilizing iris scissors. Hemigard Postcare Instructions: The HEMIGARD strips are to remain completely dry for at least 5-7 days. Zygomaticofacial Flap Text: Given the location of the defect, shape of the defect and the proximity to free margins a zygomaticofacial flap was deemed most appropriate for repair.  Using a sterile surgical marker, the appropriate flap was drawn incorporating the defect and placing the expected incisions within the relaxed skin tension lines where possible. The area thus outlined was incised deep to adipose tissue with a #15 scalpel blade with preservation of a vascular pedicle.  The skin margins were undermined to an appropriate distance in all directions utilizing iris scissors.  The flap was then placed into the defect and anchored with interrupted buried subcutaneous sutures. Banner Transposition Flap Text: The defect edges were debeveled with a #15 scalpel blade.  Given the location of the defect and the proximity to free margins a Banner transposition flap was deemed most appropriate.  Using a sterile surgical marker, an appropriate flap drawn around the defect. The area thus outlined was incised deep to adipose tissue with a #15 scalpel blade.  The skin margins were undermined to an appropriate distance in all directions utilizing iris scissors. Staging Info: By selecting yes to the question above you will include information on AJCC 8 tumor staging in your Mohs note. Information on tumor staging will be automatically added for SCCs on the head and neck. AJCC 8 includes tumor size, tumor depth, perineural involvement and bone invasion. Star Wedge Flap Text: The defect edges were debeveled with a #15 scalpel blade.  Given the location of the defect, shape of the defect and the proximity to free margins a star wedge flap was deemed most appropriate.  Using a sterile surgical marker, an appropriate rotation flap was drawn incorporating the defect and placing the expected incisions within the relaxed skin tension lines where possible. The area thus outlined was incised deep to adipose tissue with a #15 scalpel blade.  The skin margins were undermined to an appropriate distance in all directions utilizing iris scissors. Tissue Cultured Epidermal Autograft Text: The defect edges were debeveled with a #15 scalpel blade.  Given the location of the defect, shape of the defect and the proximity to free margins a tissue cultured epidermal autograft was deemed most appropriate.  The graft was then trimmed to fit the size of the defect.  The graft was then placed in the primary defect and oriented appropriately. V-Y Plasty Text: The defect edges were debeveled with a #15 scalpel blade.  Given the location of the defect, shape of the defect and the proximity to free margins an V-Y advancement flap was deemed most appropriate.  Using a sterile surgical marker, an appropriate advancement flap was drawn incorporating the defect and placing the expected incisions within the relaxed skin tension lines where possible.    The area thus outlined was incised deep to adipose tissue with a #15 scalpel blade.  The skin margins were undermined to an appropriate distance in all directions utilizing iris scissors. Double Island Pedicle Flap Text: The defect edges were debeveled with a #15 scalpel blade.  Given the location of the defect, shape of the defect and the proximity to free margins a double island pedicle advancement flap was deemed most appropriate.  Using a sterile surgical marker, an appropriate advancement flap was drawn incorporating the defect, outlining the appropriate donor tissue and placing the expected incisions within the relaxed skin tension lines where possible.    The area thus outlined was incised deep to adipose tissue with a #15 scalpel blade.  The skin margins were undermined to an appropriate distance in all directions around the primary defect and laterally outward around the island pedicle utilizing iris scissors.  There was minimal undermining beneath the pedicle flap. Provider Procedure Text (E): After obtaining clear surgical margins the defect was repaired by another provider. Retention Suture Text: Retention sutures were placed to support the closure and prevent dehiscence. Mauc Instructions: By selecting yes to the question below the MAUC number will be added into the note.  This will be calculated automatically based on the diagnosis chosen, the size entered, the body zone selected (H,M,L) and the specific indications you chose. You will also have the option to override the Mohs AUC if you disagree with the automatically calculated number and this option is found in the Case Summary tab. Alar Island Pedicle Flap Text: The defect edges were debeveled with a #15 scalpel blade.  Given the location of the defect, shape of the defect and the proximity to the alar rim an island pedicle advancement flap was deemed most appropriate.  Using a sterile surgical marker, an appropriate advancement flap was drawn incorporating the defect, outlining the appropriate donor tissue and placing the expected incisions within the nasal ala running parallel to the alar rim. The area thus outlined was incised with a #15 scalpel blade.  The skin margins were undermined minimally to an appropriate distance in all directions around the primary defect and laterally outward around the island pedicle utilizing iris scissors.  There was minimal undermining beneath the pedicle flap. Xenograft Text: The defect edges were debeveled with a #15 scalpel blade.  Given the location of the defect, shape of the defect and the proximity to free margins a xenograft was deemed most appropriate.  The graft was then trimmed to fit the size of the defect.  The graft was then placed in the primary defect and oriented appropriately. Anesthesia Volume In Cc: 6 Advancement Flap (Double) Text: The defect edges were debeveled with a #15 scalpel blade.  Given the location of the defect and the proximity to free margins a double advancement flap was deemed most appropriate.  Using a sterile surgical marker, the appropriate advancement flaps were drawn incorporating the defect and placing the expected incisions within the relaxed skin tension lines where possible.    The area thus outlined was incised deep to adipose tissue with a #15 scalpel blade.  The skin margins were undermined to an appropriate distance in all directions utilizing iris scissors. Mart-1 - Positive Histology Text: MART-1 staining demonstrates areas of higher density and clustering of melanocytes with Pagetoid spread upwards within the epidermis. The surgical margins are positive for tumor cells. Adjacent Tissue Transfer Text: The defect edges were debeveled with a #15 scalpel blade.  Given the location of the defect and the proximity to free margins an adjacent tissue transfer was deemed most appropriate.  Using a sterile surgical marker, an appropriate flap was drawn incorporating the defect and placing the expected incisions within the relaxed skin tension lines where possible.    The area thus outlined was incised deep to adipose tissue with a #15 scalpel blade.  The skin margins were undermined to an appropriate distance in all directions utilizing iris scissors. Full Thickness Lip Wedge Repair (Flap) Text: Given the location of the defect and the proximity to free margins a full thickness wedge repair was deemed most appropriate.  Using a sterile surgical marker, the appropriate repair was drawn incorporating the defect and placing the expected incisions perpendicular to the vermilion border.  The vermilion border was also meticulously outlined to ensure appropriate reapproximation during the repair.  The area thus outlined was incised through and through with a #15 scalpel blade.  The muscularis and dermis were reaproximated with deep sutures following hemostasis. Care was taken to realign the vermilion border before proceeding with the superficial closure.  Once the vermilion was realigned the superfical and mucosal closure was finished. Referring Physician (Optional): dr. [email or web address] Helical Rim Advancement Flap Text: The defect edges were debeveled with a #15 blade scalpel.  Given the location of the defect and the proximity to free margins (helical rim) a double helical rim advancement flap was deemed most appropriate.  Using a sterile surgical marker, the appropriate advancement flaps were drawn incorporating the defect and placing the expected incisions between the helical rim and antihelix where possible.  The area thus outlined was incised through and through with a #15 scalpel blade.  With a skin hook and iris scissors, the flaps were gently and sharply undermined and freed up. Dressing: dry sterile dressing Double O-Z Plasty Text: The defect edges were debeveled with a #15 scalpel blade.  Given the location of the defect, shape of the defect and the proximity to free margins a Double O-Z plasty (double transposition flap) was deemed most appropriate.  Using a sterile surgical marker, the appropriate transposition flaps were drawn incorporating the defect and placing the expected incisions within the relaxed skin tension lines where possible. The area thus outlined was incised deep to adipose tissue with a #15 scalpel blade.  The skin margins were undermined to an appropriate distance in all directions utilizing iris scissors.  Hemostasis was achieved with electrocautery.  The flaps were then transposed into place, one clockwise and the other counterclockwise, and anchored with interrupted buried subcutaneous sutures. Bi-Rhombic Flap Text: The defect edges were debeveled with a #15 scalpel blade.  Given the location of the defect and the proximity to free margins a bi-rhombic flap was deemed most appropriate.  Using a sterile surgical marker, an appropriate rhombic flap was drawn incorporating the defect. The area thus outlined was incised deep to adipose tissue with a #15 scalpel blade.  The skin margins were undermined to an appropriate distance in all directions utilizing iris scissors. Staged Advancement Flap Text: The defect edges were debeveled with a #15 scalpel blade.  Given the location of the defect, shape of the defect and the proximity to free margins a staged advancement flap was deemed most appropriate.  Using a sterile surgical marker, an appropriate advancement flap was drawn incorporating the defect and placing the expected incisions within the relaxed skin tension lines where possible. The area thus outlined was incised deep to adipose tissue with a #15 scalpel blade.  The skin margins were undermined to an appropriate distance in all directions utilizing iris scissors. Area H Indication Text: Tumors in this location are included in Area H (eyelids, eyebrows, nose, lips, chin, ear, pre-auricular, post-auricular, temple, genitalia, hands, feet, ankles and areola).  Tissue conservation is critical in these anatomic locations. Skin Substitute Text: The defect edges were debeveled with a #15 scalpel blade.  Given the location of the defect, shape of the defect and the proximity to free margins a skin substitute graft was deemed most appropriate.  The graft material was trimmed to fit the size of the defect. The graft was then placed in the primary defect and oriented appropriately. Nasal Turnover Hinge Flap Text: The defect edges were debeveled with a #15 scalpel blade.  Given the size, depth, location of the defect and the defect being full thickness a nasal turnover hinge flap was deemed most appropriate.  Using a sterile surgical marker, an appropriate hinge flap was drawn incorporating the defect. The area thus outlined was incised with a #15 scalpel blade. The flap was designed to recreate the nasal mucosal lining and the alar rim. The skin margins were undermined to an appropriate distance in all directions utilizing iris scissors. Dorsal Nasal Flap Text: The defect edges were debeveled with a #15 scalpel blade.  Given the location of the defect and the proximity to free margins a dorsal nasal flap was deemed most appropriate.  Using a sterile surgical marker, an appropriate dorsal nasal flap was drawn around the defect.    The area thus outlined was incised deep to adipose tissue with a #15 scalpel blade.  The skin margins were undermined to an appropriate distance in all directions utilizing iris scissors. Chonodrocutaneous Helical Advancement Flap Text: The defect edges were debeveled with a #15 scalpel blade.  Given the location of the defect and the proximity to free margins a chondrocutaneous helical advancement flap was deemed most appropriate.  Using a sterile surgical marker, the appropriate advancement flap was drawn incorporating the defect and placing the expected incisions within the relaxed skin tension lines where possible.    The area thus outlined was incised deep to adipose tissue with a #15 scalpel blade.  The skin margins were undermined to an appropriate distance in all directions utilizing iris scissors. Hemigard Intro: Due to skin fragility and wound tension, it was decided to use HEMIGARD adhesive retention suture devices to permit a linear closure. The skin was cleaned and dried for a 6cm distance away from the wound. Excessive hair, if present, was removed to allow for adhesion. Purse String (Intermediate) Text: Given the location of the defect and the characteristics of the surrounding skin a purse string intermediate closure was deemed most appropriate.  Undermining was performed circumfirentially around the surgical defect.  A purse string suture was then placed and tightened. W Plasty Text: The lesion was extirpated to the level of the fat with a #15 scalpel blade.  Given the location of the defect, shape of the defect and the proximity to free margins a W-plasty was deemed most appropriate for repair.  Using a sterile surgical marker, the appropriate transposition arms of the W-plasty were drawn incorporating the defect and placing the expected incisions within the relaxed skin tension lines where possible.    The area thus outlined was incised deep to adipose tissue with a #15 scalpel blade.  The skin margins were undermined to an appropriate distance in all directions utilizing iris scissors.  The opposing transposition arms were then transposed into place in opposite direction and anchored with interrupted buried subcutaneous sutures. Surgeon/Pathologist Verbiage (Will Incorporate Name Of Surgeon From Intro If Not Blank): operated in two distinct and integrated capacities as the surgeon and pathologist. Tumor Depth: Less than 6mm from granular layer and no invasion beyond the subcutaneous fat Ear Star Wedge Flap Text: The defect edges were debeveled with a #15 blade scalpel.  Given the location of the defect and the proximity to free margins (helical rim) an ear star wedge flap was deemed most appropriate.  Using a sterile surgical marker, the appropriate flap was drawn incorporating the defect and placing the expected incisions between the helical rim and antihelix where possible.  The area thus outlined was incised through and through with a #15 scalpel blade. Cheek Interpolation Flap Text: A decision was made to reconstruct the defect utilizing an interpolation axial flap and a staged reconstruction.  A telfa template was made of the defect.  This telfa template was then used to outline the Cheek Interpolation flap.  The donor area for the pedicle flap was then injected with anesthesia.  The flap was excised through the skin and subcutaneous tissue down to the layer of the underlying musculature.  The interpolation flap was carefully excised within this deep plane to maintain its blood supply.  The edges of the donor site were undermined.   The donor site was closed in a primary fashion.  The pedicle was then rotated into position and sutured.  Once the tube was sutured into place, adequate blood supply was confirmed with blanching and refill.  The pedicle was then wrapped with xeroform gauze and dressed appropriately with a telfa and gauze bandage to ensure continued blood supply and protect the attached pedicle. Subsequent Stages Histo Method Verbiage: Using a similar technique to that described above, a thin layer of tissue was removed from all areas where tumor was visible on the previous stage.  The tissue was again oriented, mapped, dyed, and processed as above. Epidermal Closure Graft Donor Site (Optional): simple interrupted Consent (Nose)/Introductory Paragraph: The rationale for Mohs was explained to the patient and consent was obtained. The risks, benefits and alternatives to therapy were discussed in detail. Specifically, the risks of nasal deformity, changes in the flow of air through the nose, infection, scarring, bleeding, prolonged wound healing, incomplete removal, allergy to anesthesia, nerve injury and recurrence were addressed. Prior to the procedure, the treatment site was clearly identified and confirmed by the patient. All components of Universal Protocol/PAUSE Rule completed. Mohs Histo Method Verbiage: Each section was then chromacoded and processed in the Mohs lab using the Mohs protocol and submitted for frozen section. Postop Diagnosis: same Ear Wedge Repair Text: A wedge excision was completed by carrying down an excision through the full thickness of the ear and cartilage with an inward facing Burow's triangle. The wound was then closed in a layered fashion. Surgeon Performing Repair (Optional): AMALIA Lucas Double O-Z Flap Text: The defect edges were debeveled with a #15 scalpel blade.  Given the location of the defect, shape of the defect and the proximity to free margins a Double O-Z flap was deemed most appropriate.  Using a sterile surgical marker, an appropriate transposition flap was drawn incorporating the defect and placing the expected incisions within the relaxed skin tension lines where possible. The area thus outlined was incised deep to adipose tissue with a #15 scalpel blade.  The skin margins were undermined to an appropriate distance in all directions utilizing iris scissors. Date Of Previous Biopsy (Optional): 10-21-21 Pain Refusal Text: I offered to prescribe pain medication but the patient refused to take this medication. Deep Sutures: 4-0 Monocryl Advancement Flap (Single) Text: The defect edges were debeveled with a #15 scalpel blade.  Given the location of the defect and the proximity to free margins a single advancement flap was deemed most appropriate.  Using a sterile surgical marker, an appropriate advancement flap was drawn incorporating the defect and placing the expected incisions within the relaxed skin tension lines where possible.    The area thus outlined was incised deep to adipose tissue with a #15 scalpel blade.  The skin margins were undermined to an appropriate distance in all directions utilizing iris scissors. Repair Type: Complex Repair Island Pedicle Flap Text: The defect edges were debeveled with a #15 scalpel blade.  Given the location of the defect, shape of the defect and the proximity to free margins an island pedicle advancement flap was deemed most appropriate.  Using a sterile surgical marker, an appropriate advancement flap was drawn incorporating the defect, outlining the appropriate donor tissue and placing the expected incisions within the relaxed skin tension lines where possible.    The area thus outlined was incised deep to adipose tissue with a #15 scalpel blade.  The skin margins were undermined to an appropriate distance in all directions around the primary defect and laterally outward around the island pedicle utilizing iris scissors.  There was minimal undermining beneath the pedicle flap. Dermal Autograft Text: The defect edges were debeveled with a #15 scalpel blade.  Given the location of the defect, shape of the defect and the proximity to free margins a dermal autograft was deemed most appropriate.  Using a sterile surgical marker, the primary defect shape was transferred to the donor site. The area thus outlined was incised deep to adipose tissue with a #15 scalpel blade.  The harvested graft was then trimmed of adipose and epidermal tissue until only dermis was left.  The skin graft was then placed in the primary defect and oriented appropriately. Simple / Intermediate / Complex Repair - Final Wound Length In Cm: 3.2 Consent (Spinal Accessory)/Introductory Paragraph: The rationale for Mohs was explained to the patient and consent was obtained. The risks, benefits and alternatives to therapy were discussed in detail. Specifically, the risks of damage to the spinal accessory nerve, infection, scarring, bleeding, prolonged wound healing, incomplete removal, allergy to anesthesia, and recurrence were addressed. Prior to the procedure, the treatment site was clearly identified and confirmed by the patient. All components of Universal Protocol/PAUSE Rule completed. Rhomboid Transposition Flap Text: The defect edges were debeveled with a #15 scalpel blade.  Given the location of the defect and the proximity to free margins a rhomboid transposition flap was deemed most appropriate.  Using a sterile surgical marker, an appropriate rhomboid flap was drawn incorporating the defect.    The area thus outlined was incised deep to adipose tissue with a #15 scalpel blade.  The skin margins were undermined to an appropriate distance in all directions utilizing iris scissors. Wound Care: Petrolatum Burow's Advancement Flap Text: The defect edges were debeveled with a #15 scalpel blade.  Given the location of the defect and the proximity to free margins a Burow's advancement flap was deemed most appropriate.  Using a sterile surgical marker, the appropriate advancement flap was drawn incorporating the defect and placing the expected incisions within the relaxed skin tension lines where possible.    The area thus outlined was incised deep to adipose tissue with a #15 scalpel blade.  The skin margins were undermined to an appropriate distance in all directions utilizing iris scissors. Suturegard Body: The suture ends were repeatedly re-tightened and re-clamped to achieve the desired tissue expansion. Stage 1 Add-On Histology Text: Glassy eosinophilic atypical keratinizing cells with surrounding inflammatory infiltrate. Non-Graft Cartilage Fenestration Text: The cartilage was fenestrated with a 2mm punch biopsy to help facilitate healing. Complex Repair And Graft Additional Text (Will Appearing After The Standard Complex Repair Text): The complex repair was not sufficient to completely close the primary defect. The remaining additional defect was repaired with the graft mentioned below. O-Z Flap Text: The defect edges were debeveled with a #15 scalpel blade.  Given the location of the defect, shape of the defect and the proximity to free margins an O-Z flap was deemed most appropriate.  Using a sterile surgical marker, an appropriate transposition flap was drawn incorporating the defect and placing the expected incisions within the relaxed skin tension lines where possible. The area thus outlined was incised deep to adipose tissue with a #15 scalpel blade.  The skin margins were undermined to an appropriate distance in all directions utilizing iris scissors. Brow Lift Text: A midfrontal incision was made medially to the defect to allow access to the tissues just superior to the left eyebrow. Following careful dissection inferiorly in a supraperiosteal plane to the level of the left eyebrow, several 3-0 monocryl sutures were used to resuspend the eyebrow orbicularis oculi muscular unit to the superior frontal bone periosteum. This resulted in an appropriate reapproximation of static eyebrow symmetry and correction of the left brow ptosis. Vermilion Border Text: The closure involved the vermilion border. Suturegard Intro: Intraoperative tissue expansion was performed, utilizing the SUTUREGARD device, in order to reduce wound tension. Eye Protection Verbiage: Before proceeding with the stage, a plastic scleral shield was inserted. The globe was anesthetized with a few drops of 1% lidocaine with 1:100,000 epinephrine. Then, an appropriate sized scleral shield was chosen and coated with lacrilube ointment. The shield was gently inserted and left in place for the duration of each stage. After the stage was completed, the shield was gently removed. O-T Plasty Text: The defect edges were debeveled with a #15 scalpel blade.  Given the location of the defect, shape of the defect and the proximity to free margins an O-T plasty was deemed most appropriate.  Using a sterile surgical marker, an appropriate O-T plasty was drawn incorporating the defect and placing the expected incisions within the relaxed skin tension lines where possible.    The area thus outlined was incised deep to adipose tissue with a #15 scalpel blade.  The skin margins were undermined to an appropriate distance in all directions utilizing iris scissors. Z Plasty Text: The lesion was extirpated to the level of the fat with a #15 scalpel blade.  Given the location of the defect, shape of the defect and the proximity to free margins a Z-plasty was deemed most appropriate for repair.  Using a sterile surgical marker, the appropriate transposition arms of the Z-plasty were drawn incorporating the defect and placing the expected incisions within the relaxed skin tension lines where possible.    The area thus outlined was incised deep to adipose tissue with a #15 scalpel blade.  The skin margins were undermined to an appropriate distance in all directions utilizing iris scissors.  The opposing transposition arms were then transposed into place in opposite direction and anchored with interrupted buried subcutaneous sutures. Donor Site Anesthesia Type: same as repair anesthesia Crescentic Intermediate Repair Preamble Text (Leave Blank If You Do Not Want): Undermining was performed with blunt dissection. Melolabial Interpolation Flap Text: A decision was made to reconstruct the defect utilizing an interpolation axial flap and a staged reconstruction.  A telfa template was made of the defect.  This telfa template was then used to outline the melolabial interpolation flap.  The donor area for the pedicle flap was then injected with anesthesia.  The flap was excised through the skin and subcutaneous tissue down to the layer of the underlying musculature.  The pedicle flap was carefully excised within this deep plane to maintain its blood supply.  The edges of the donor site were undermined.   The donor site was closed in a primary fashion.  The pedicle was then rotated into position and sutured.  Once the tube was sutured into place, adequate blood supply was confirmed with blanching and refill.  The pedicle was then wrapped with xeroform gauze and dressed appropriately with a telfa and gauze bandage to ensure continued blood supply and protect the attached pedicle. Closure 2 Information: This tab is for additional flaps and grafts, including complex repair and grafts and complex repair and flaps. You can also specify a different location for the additional defect, if the location is the same you do not need to select a new one. We will insert the automated text for the repair you select below just as we do for solitary flaps and grafts. Please note that at this time if you select a location with a different insurance zone you will need to override the ICD10 and CPT if appropriate. Consent (Lip)/Introductory Paragraph: The rationale for Mohs was explained to the patient and consent was obtained. The risks, benefits and alternatives to therapy were discussed in detail. Specifically, the risks of lip deformity, changes in the oral aperture, infection, scarring, bleeding, prolonged wound healing, incomplete removal, allergy to anesthesia, nerve injury and recurrence were addressed. Prior to the procedure, the treatment site was clearly identified and confirmed by the patient. All components of Universal Protocol/PAUSE Rule completed. No Residual Tumor Seen Histology Text: There were no malignant cells seen in the sections examined. Cheiloplasty (Complex) Text: A decision was made to reconstruct the defect with a  cheiloplasty.  The defect was undermined extensively.  Additional obicularis oris muscle was excised with a 15 blade scalpel.  The defect was converted into a full thickness wedge to facilite a better cosmetic result.  Small vessels were then tied off with 5-0 monocyrl. The obicularis oris, superficial fascia, adipose and dermis were then reapproximated.  After the deeper layers were approximated the epidermis was reapproximated with particular care given to realign the vermilion border. Consent (Marginal Mandibular)/Introductory Paragraph: The rationale for Mohs was explained to the patient and consent was obtained. The risks, benefits and alternatives to therapy were discussed in detail. Specifically, the risks of damage to the marginal mandibular branch of the facial nerve, infection, scarring, bleeding, prolonged wound healing, incomplete removal, allergy to anesthesia, and recurrence were addressed. Prior to the procedure, the treatment site was clearly identified and confirmed by the patient. All components of Universal Protocol/PAUSE Rule completed. Mastoid Interpolation Flap Text: A decision was made to reconstruct the defect utilizing an interpolation axial flap and a staged reconstruction.  A telfa template was made of the defect.  This telfa template was then used to outline the mastoid interpolation flap.  The donor area for the pedicle flap was then injected with anesthesia.  The flap was excised through the skin and subcutaneous tissue down to the layer of the underlying musculature.  The pedicle flap was carefully excised within this deep plane to maintain its blood supply.  The edges of the donor site were undermined.   The donor site was closed in a primary fashion.  The pedicle was then rotated into position and sutured.  Once the tube was sutured into place, adequate blood supply was confirmed with blanching and refill.  The pedicle was then wrapped with xeroform gauze and dressed appropriately with a telfa and gauze bandage to ensure continued blood supply and protect the attached pedicle. Consent (Temporal Branch)/Introductory Paragraph: The rationale for Mohs was explained to the patient and consent was obtained. The risks, benefits and alternatives to therapy were discussed in detail. Specifically, the risks of damage to the temporal branch of the facial nerve, infection, scarring, bleeding, prolonged wound healing, incomplete removal, allergy to anesthesia, and recurrence were addressed. Prior to the procedure, the treatment site was clearly identified and confirmed by the patient. All components of Universal Protocol/PAUSE Rule completed. Inflammation Suggestive Of Cancer Camouflage Histology Text: There was a dense lymphocytic infiltrate which prevented adequate histologic evaluation of adjacent structures. Area L Indication Text: Tumors in this location are included in Area L (trunk and extremities).  Mohs surgery is indicated for larger tumors, or tumors with aggressive histologic features, in these anatomic locations. Rhombic Flap Text: The defect edges were debeveled with a #15 scalpel blade.  Given the location of the defect and the proximity to free margins a rhombic flap was deemed most appropriate.  Using a sterile surgical marker, an appropriate rhombic flap was drawn incorporating the defect.    The area thus outlined was incised deep to adipose tissue with a #15 scalpel blade.  The skin margins were undermined to an appropriate distance in all directions utilizing iris scissors. Peng Advancement Flap Text: The defect edges were debeveled with a #15 scalpel blade.  Given the location of the defect, shape of the defect and the proximity to free margins a Peng advancement flap was deemed most appropriate.  Using a sterile surgical marker, an appropriate advancement flap was drawn incorporating the defect and placing the expected incisions within the relaxed skin tension lines where possible. The area thus outlined was incised deep to adipose tissue with a #15 scalpel blade.  The skin margins were undermined to an appropriate distance in all directions utilizing iris scissors. Why Was The Change Made?: Please Select the Appropriate Response Suturegard Retention Suture: 2-0 Nylon Epidermal Autograft Text: The defect edges were debeveled with a #15 scalpel blade.  Given the location of the defect, shape of the defect and the proximity to free margins an epidermal autograft was deemed most appropriate.  Using a sterile surgical marker, the primary defect shape was transferred to the donor site. The epidermal graft was then harvested.  The skin graft was then placed in the primary defect and oriented appropriately. Paramedian Forehead Flap Text: A decision was made to reconstruct the defect utilizing an interpolation axial flap and a staged reconstruction.  A telfa template was made of the defect.  This telfa template was then used to outline the paramedian forehead pedicle flap.  The donor area for the pedicle flap was then injected with anesthesia.  The flap was excised through the skin and subcutaneous tissue down to the layer of the underlying musculature.  The pedicle flap was carefully excised within this deep plane to maintain its blood supply.  The edges of the donor site were undermined.   The donor site was closed in a primary fashion.  The pedicle was then rotated into position and sutured.  Once the tube was sutured into place, adequate blood supply was confirmed with blanching and refill.  The pedicle was then wrapped with xeroform gauze and dressed appropriately with a telfa and gauze bandage to ensure continued blood supply and protect the attached pedicle. Epidermal Closure: running Keystone Flap Text: The defect edges were debeveled with a #15 scalpel blade.  Given the location of the defect, shape of the defect a keystone flap was deemed most appropriate.  Using a sterile surgical marker, an appropriate keystone flap was drawn incorporating the defect, outlining the appropriate donor tissue and placing the expected incisions within the relaxed skin tension lines where possible. The area thus outlined was incised deep to adipose tissue with a #15 scalpel blade.  The skin margins were undermined to an appropriate distance in all directions around the primary defect and laterally outward around the flap utilizing iris scissors. Purse String (Simple) Text: Given the location of the defect and the characteristics of the surrounding skin a purse string closure was deemed most appropriate.  Undermining was performed circumfirentially around the surgical defect.  A purse string suture was then placed and tightened. Epidermal Sutures: 5-0 Fast Absorbing Gut Interpolation Flap Text: A decision was made to reconstruct the defect utilizing an interpolation axial flap and a staged reconstruction.  A telfa template was made of the defect.  This telfa template was then used to outline the interpolation flap.  The donor area for the pedicle flap was then injected with anesthesia.  The flap was excised through the skin and subcutaneous tissue down to the layer of the underlying musculature.  The interpolation flap was carefully excised within this deep plane to maintain its blood supply.  The edges of the donor site were undermined.   The donor site was closed in a primary fashion.  The pedicle was then rotated into position and sutured.  Once the tube was sutured into place, adequate blood supply was confirmed with blanching and refill.  The pedicle was then wrapped with xeroform gauze and dressed appropriately with a telfa and gauze bandage to ensure continued blood supply and protect the attached pedicle. Estimated Blood Loss (Cc): minimal Mucosal Advancement Flap Text: Given the location of the defect, shape of the defect and the proximity to free margins a mucosal advancement flap was deemed most appropriate. Incisions were made with a 15 blade scalpel in the appropriate fashion along the cutaneous vermilion border and the mucosal lip. The remaining actinically damaged mucosal tissue was excised.  The mucosal advancement flap was then elevated to the gingival sulcus with care taken to preserve the neurovascular structures and advanced into the primary defect. Care was taken to ensure that precise realignment of the vermilion border was achieved. Cheiloplasty (Less Than 50%) Text: A decision was made to reconstruct the defect with a  cheiloplasty.  The defect was undermined extensively.  Additional obicularis oris muscle was excised with a 15 blade scalpel.  The defect was converted into a full thickness wedge, of less than 50% of the vertical height of the lip, to facilite a better cosmetic result.  Small vessels were then tied off with 5-0 monocyrl. The obicularis oris, superficial fascia, adipose and dermis were then reapproximated.  After the deeper layers were approximated the epidermis was reapproximated with particular care given to realign the vermilion border. Island Pedicle Flap-Requiring Vessel Identification Text: The defect edges were debeveled with a #15 scalpel blade.  Given the location of the defect, shape of the defect and the proximity to free margins an island pedicle advancement flap was deemed most appropriate.  Using a sterile surgical marker, an appropriate advancement flap was drawn, based on the axial vessel mentioned above, incorporating the defect, outlining the appropriate donor tissue and placing the expected incisions within the relaxed skin tension lines where possible.    The area thus outlined was incised deep to adipose tissue with a #15 scalpel blade.  The skin margins were undermined to an appropriate distance in all directions around the primary defect and laterally outward around the island pedicle utilizing iris scissors.  There was minimal undermining beneath the pedicle flap. Home Suture Removal Text: Patient was provided instructions on removing sutures and will remove their sutures at home.  If they have any questions or difficulties they will call the office. Debridement Text: The wound edges were debrided prior to proceeding with the closure to facilitate wound healing. Consent 2/Introductory Paragraph: Mohs surgery was explained to the patient and consent was obtained. The risks, benefits and alternatives to therapy were discussed in detail. Specifically, the risks of infection, scarring, bleeding, prolonged wound healing, incomplete removal, allergy to anesthesia, nerve injury and recurrence were addressed. Prior to the procedure, the treatment site was clearly identified and confirmed by the patient. All components of Universal Protocol/PAUSE Rule completed. Mart-1 - Negative Histology Text: MART-1 staining demonstrates a normal density and pattern of melanocytes along the dermal-epidermal junction. The surgical margins are negative for tumor cells. Nostril Rim Text: The closure involved the nostril rim. O-L Flap Text: The defect edges were debeveled with a #15 scalpel blade.  Given the location of the defect, shape of the defect and the proximity to free margins an O-L flap was deemed most appropriate.  Using a sterile surgical marker, an appropriate advancement flap was drawn incorporating the defect and placing the expected incisions within the relaxed skin tension lines where possible.    The area thus outlined was incised deep to adipose tissue with a #15 scalpel blade.  The skin margins were undermined to an appropriate distance in all directions utilizing iris scissors. Complex Repair And Flap Additional Text (Will Appearing After The Standard Complex Repair Text): The complex repair was not sufficient to completely close the primary defect. The remaining additional defect was repaired with the flap mentioned below. Number Of Stages: 2 Spiral Flap Text: The defect edges were debeveled with a #15 scalpel blade.  Given the location of the defect, shape of the defect and the proximity to free margins a spiral flap was deemed most appropriate.  Using a sterile surgical marker, an appropriate rotation flap was drawn incorporating the defect and placing the expected incisions within the relaxed skin tension lines where possible. The area thus outlined was incised deep to adipose tissue with a #15 scalpel blade.  The skin margins were undermined to an appropriate distance in all directions utilizing iris scissors. Bilateral Helical Rim Advancement Flap Text: The defect edges were debeveled with a #15 blade scalpel.  Given the location of the defect and the proximity to free margins (helical rim) a bilateral helical rim advancement flap was deemed most appropriate.  Using a sterile surgical marker, the appropriate advancement flaps were drawn incorporating the defect and placing the expected incisions between the helical rim and antihelix where possible.  The area thus outlined was incised through and through with a #15 scalpel blade.  With a skin hook and iris scissors, the flaps were gently and sharply undermined and freed up. Consent 3/Introductory Paragraph: I gave the patient a chance to ask questions they had about the procedure.  Following this I explained the Mohs procedure and consent was obtained. The risks, benefits and alternatives to therapy were discussed in detail. Specifically, the risks of infection, scarring, bleeding, prolonged wound healing, incomplete removal, allergy to anesthesia, nerve injury and recurrence were addressed. Prior to the procedure, the treatment site was clearly identified and confirmed by the patient. All components of Universal Protocol/PAUSE Rule completed. Primary Defect Length In Cm (Final Defect Size - Required For Flaps/Grafts): 1.2 Composite Graft Text: The defect edges were debeveled with a #15 scalpel blade.  Given the location of the defect, shape of the defect, the proximity to free margins and the fact the defect was full thickness a composite graft was deemed most appropriate.  The defect was outline and then transferred to the donor site.  A full thickness graft was then excised from the donor site. The graft was then placed in the primary defect, oriented appropriately and then sutured into place.  The secondary defect was then repaired using a primary closure. Repair Anesthesia Method: local infiltration Melolabial Transposition Flap Text: The defect edges were debeveled with a #15 scalpel blade.  Given the location of the defect and the proximity to free margins a melolabial flap was deemed most appropriate.  Using a sterile surgical marker, an appropriate melolabial transposition flap was drawn incorporating the defect.    The area thus outlined was incised deep to adipose tissue with a #15 scalpel blade.  The skin margins were undermined to an appropriate distance in all directions utilizing iris scissors. Island Pedicle Flap With Canthal Suspension Text: The defect edges were debeveled with a #15 scalpel blade.  Given the location of the defect, shape of the defect and the proximity to free margins an island pedicle advancement flap was deemed most appropriate.  Using a sterile surgical marker, an appropriate advancement flap was drawn incorporating the defect, outlining the appropriate donor tissue and placing the expected incisions within the relaxed skin tension lines where possible. The area thus outlined was incised deep to adipose tissue with a #15 scalpel blade.  The skin margins were undermined to an appropriate distance in all directions around the primary defect and laterally outward around the island pedicle utilizing iris scissors.  There was minimal undermining beneath the pedicle flap. A suspension suture was placed in the canthal tendon to prevent tension and prevent ectropion. Tumor Debulked?: curette Mustarde Flap Text: The defect edges were debeveled with a #15 scalpel blade.  Given the size, depth and location of the defect and the proximity to free margins a Mustarde flap was deemed most appropriate.  Using a sterile surgical marker, an appropriate flap was drawn incorporating the defect. The area thus outlined was incised with a #15 scalpel blade.  The skin margins were undermined to an appropriate distance in all directions utilizing iris scissors. Consent (Scalp)/Introductory Paragraph: The rationale for Mohs was explained to the patient and consent was obtained. The risks, benefits and alternatives to therapy were discussed in detail. Specifically, the risks of changes in hair growth pattern secondary to repair, infection, scarring, bleeding, prolonged wound healing, incomplete removal, allergy to anesthesia, nerve injury and recurrence were addressed. Prior to the procedure, the treatment site was clearly identified and confirmed by the patient. All components of Universal Protocol/PAUSE Rule completed. Bilobed Transposition Flap Text: The defect edges were debeveled with a #15 scalpel blade.  Given the location of the defect and the proximity to free margins a bilobed transposition flap was deemed most appropriate.  Using a sterile surgical marker, an appropriate bilobe flap drawn around the defect.    The area thus outlined was incised deep to adipose tissue with a #15 scalpel blade.  The skin margins were undermined to an appropriate distance in all directions utilizing iris scissors. Consent 1/Introductory Paragraph: The rationale for Mohs was explained to the patient and consent was obtained. The risks, benefits and alternatives to therapy were discussed in detail. Specifically, the risks of infection, scarring, bleeding, prolonged wound healing, incomplete removal, allergy to anesthesia, nerve injury and recurrence were addressed. Prior to the procedure, the treatment site was clearly identified and confirmed by the patient. All components of Universal Protocol/PAUSE Rule completed. Scc In Situ With Follicular Extension Histology Text: Full thickness epidermal atypia. O-T Advancement Flap Text: The defect edges were debeveled with a #15 scalpel blade.  Given the location of the defect, shape of the defect and the proximity to free margins an O-T advancement flap was deemed most appropriate.  Using a sterile surgical marker, an appropriate advancement flap was drawn incorporating the defect and placing the expected incisions within the relaxed skin tension lines where possible.    The area thus outlined was incised deep to adipose tissue with a #15 scalpel blade.  The skin margins were undermined to an appropriate distance in all directions utilizing iris scissors. Tarsorrhaphy Text: A tarsorrhaphy was performed using Frost sutures. Burow's Graft Text: The defect edges were debeveled with a #15 scalpel blade.  Given the location of the defect, shape of the defect, the proximity to free margins and the presence of a standing cone deformity a Burow's skin graft was deemed most appropriate. The standing cone was removed and this tissue was then trimmed to the shape of the primary defect. The adipose tissue was also removed until only dermis and epidermis were left.  The skin margins of the secondary defect were undermined to an appropriate distance in all directions utilizing iris scissors.  The secondary defect was closed with interrupted buried subcutaneous sutures.  The skin edges were then re-apposed with running  sutures.  The skin graft was then placed in the primary defect and oriented appropriately. Area M Indication Text: Tumors in this location are included in Area M (cheek, forehead, scalp, neck, jawline and pretibial skin).  Mohs surgery is indicated for tumors in these anatomic locations. Manual Repair Warning Statement: We plan on removing the manually selected variable below in favor of our much easier automatic structured text blocks found in the previous tab. We decided to do this to help make the flow better and give you the full power of structured data. Manual selection is never going to be ideal in our platform and I would encourage you to avoid using manual selection from this point on, especially since I will be sunsetting this feature. It is important that you do one of two things with the customized text below. First, you can save all of the text in a word file so you can have it for future reference. Second, transfer the text to the appropriate area in the Library tab. Lastly, if there is a flap or graft type which we do not have you need to let us know right away so I can add it in before the variable is hidden. No need to panic, we plan to give you roughly 6 months to make the change. Trilobed Flap Text: The defect edges were debeveled with a #15 scalpel blade.  Given the location of the defect and the proximity to free margins a trilobed flap was deemed most appropriate.  Using a sterile surgical marker, an appropriate trilobed flap drawn around the defect.    The area thus outlined was incised deep to adipose tissue with a #15 scalpel blade.  The skin margins were undermined to an appropriate distance in all directions utilizing iris scissors. Cartilage Graft Text: The defect edges were debeveled with a #15 scalpel blade.  Given the location of the defect, shape of the defect, the fact the defect involved a full thickness cartilage defect a cartilage graft was deemed most appropriate.  An appropriate donor site was identified, cleansed, and anesthetized. The cartilage graft was then harvested and transferred to the recipient site, oriented appropriately and then sutured into place.  The secondary defect was then repaired using a primary closure. Modified Advancement Flap Text: The defect edges were debeveled with a #15 scalpel blade.  Given the location of the defect, shape of the defect and the proximity to free margins a modified advancement flap was deemed most appropriate.  Using a sterile surgical marker, an appropriate advancement flap was drawn incorporating the defect and placing the expected incisions within the relaxed skin tension lines where possible.    The area thus outlined was incised deep to adipose tissue with a #15 scalpel blade.  The skin margins were undermined to an appropriate distance in all directions utilizing iris scissors. Detail Level: Detailed Muscle Hinge Flap Text: The defect edges were debeveled with a #15 scalpel blade.  Given the size, depth and location of the defect and the proximity to free margins a muscle hinge flap was deemed most appropriate.  Using a sterile surgical marker, an appropriate hinge flap was drawn incorporating the defect. The area thus outlined was incised with a #15 scalpel blade.  The skin margins were undermined to an appropriate distance in all directions utilizing iris scissors. Consent (Ear)/Introductory Paragraph: The rationale for Mohs was explained to the patient and consent was obtained. The risks, benefits and alternatives to therapy were discussed in detail. Specifically, the risks of ear deformity, infection, scarring, bleeding, prolonged wound healing, incomplete removal, allergy to anesthesia, nerve injury and recurrence were addressed. Prior to the procedure, the treatment site was clearly identified and confirmed by the patient. All components of Universal Protocol/PAUSE Rule completed. Partial Purse String (Intermediate) Text: Given the location of the defect and the characteristics of the surrounding skin an intermediate purse string closure was deemed most appropriate.  Undermining was performed circumfirentially around the surgical defect.  A purse string suture was then placed and tightened. Wound tension only allowed a partial closure of the circular defect. No Repair - Repaired With Adjacent Surgical Defect Text (Leave Blank If You Do Not Want): After obtaining clear surgical margins the defect was repaired concurrently with another surgical defect which was in close approximation. V-Y Flap Text: The defect edges were debeveled with a #15 scalpel blade.  Given the location of the defect, shape of the defect and the proximity to free margins a V-Y flap was deemed most appropriate.  Using a sterile surgical marker, an appropriate advancement flap was drawn incorporating the defect and placing the expected incisions within the relaxed skin tension lines where possible.    The area thus outlined was incised deep to adipose tissue with a #15 scalpel blade.  The skin margins were undermined to an appropriate distance in all directions utilizing iris scissors. Orbicularis Oris Muscle Flap Text: The defect edges were debeveled with a #15 scalpel blade.  Given that the defect affected the competency of the oral sphincter an obicularis oris muscle flap was deemed most appropriate to restore this competency and normal muscle function.  Using a sterile surgical marker, an appropriate flap was drawn incorporating the defect. The area thus outlined was incised with a #15 scalpel blade. A-T Advancement Flap Text: The defect edges were debeveled with a #15 scalpel blade.  Given the location of the defect, shape of the defect and the proximity to free margins an A-T advancement flap was deemed most appropriate.  Using a sterile surgical marker, an appropriate advancement flap was drawn incorporating the defect and placing the expected incisions within the relaxed skin tension lines where possible.    The area thus outlined was incised deep to adipose tissue with a #15 scalpel blade.  The skin margins were undermined to an appropriate distance in all directions utilizing iris scissors. Same Histology In Subsequent Stages Text: The pattern and morphology of the tumor is as described in the first stage. Consent (Near Eyelid Margin)/Introductory Paragraph: The rationale for Mohs was explained to the patient and consent was obtained. The risks, benefits and alternatives to therapy were discussed in detail. Specifically, the risks of ectropion or eyelid deformity, infection, scarring, bleeding, prolonged wound healing, incomplete removal, allergy to anesthesia, nerve injury and recurrence were addressed. Prior to the procedure, the treatment site was clearly identified and confirmed by the patient. All components of Universal Protocol/PAUSE Rule completed. Graft Donor Site Bandage (Optional-Leave Blank If You Don't Want In Note): Steri-strips and a pressure bandage were applied to the donor site. Localized Dermabrasion With Wire Brush Text: The patient was draped in routine manner.  Localized dermabrasion using 3 x 17 mm wire brush was performed in routine manner to papillary dermis. This spot dermabrasion is being performed to complete skin cancer reconstruction. It also will eliminate the other sun damaged precancerous cells that are known to be part of the regional effect of a lifetime's worth of sun exposure. This localized dermabrasion is therapeutic and should not be considered cosmetic in any regard. X Size Of Lesion In Cm (Optional): 0.8 Split-Thickness Skin Graft Text: The defect edges were debeveled with a #15 scalpel blade.  Given the location of the defect, shape of the defect and the proximity to free margins a split thickness skin graft was deemed most appropriate.  Using a sterile surgical marker, the primary defect shape was transferred to the donor site. The split thickness graft was then harvested.  The skin graft was then placed in the primary defect and oriented appropriately. Secondary Intention Text (Leave Blank If You Do Not Want): The defect will heal with secondary intention. Advancement-Rotation Flap Text: The defect edges were debeveled with a #15 scalpel blade.  Given the location of the defect, shape of the defect and the proximity to free margins an advancement-rotation flap was deemed most appropriate.  Using a sterile surgical marker, an appropriate flap was drawn incorporating the defect and placing the expected incisions within the relaxed skin tension lines where possible. The area thus outlined was incised deep to adipose tissue with a #15 scalpel blade.  The skin margins were undermined to an appropriate distance in all directions utilizing iris scissors. Mohs Case Number: [id number] O-Z Plasty Text: The defect edges were debeveled with a #15 scalpel blade.  Given the location of the defect, shape of the defect and the proximity to free margins an O-Z plasty (double transposition flap) was deemed most appropriate.  Using a sterile surgical marker, the appropriate transposition flaps were drawn incorporating the defect and placing the expected incisions within the relaxed skin tension lines where possible.    The area thus outlined was incised deep to adipose tissue with a #15 scalpel blade.  The skin margins were undermined to an appropriate distance in all directions utilizing iris scissors.  Hemostasis was achieved with electrocautery.  The flaps were then transposed into place, one clockwise and the other counterclockwise, and anchored with interrupted buried subcutaneous sutures. Information: Selecting Yes will display possible errors in your note based on the variables you have selected. This validation is only offered as a suggestion for you. PLEASE NOTE THAT THE VALIDATION TEXT WILL BE REMOVED WHEN YOU FINALIZE YOUR NOTE. IF YOU WANT TO FAX A PRELIMINARY NOTE YOU WILL NEED TO TOGGLE THIS TO 'NO' IF YOU DO NOT WANT IT IN YOUR FAXED NOTE. Previous Accession (Optional): [id number]

## 2022-04-11 NOTE — ASSESSMENT & PLAN NOTE
Addendum  created 04/11/22 1250 by Enmanuel Elder MD    Clinical Note Signed       · Started on zyprexa 2 5mg HS with improvement, may d/c  · Continue melatonin hs

## 2023-01-17 NOTE — PROGRESS NOTES
Patient currently hypotensive  AP notified and gtt placed on hold  New orders received and initiated as per protocol  Call bell within reach  Infliximab Counseling:  I discussed with the patient the risks of infliximab including but not limited to myelosuppression, immunosuppression, autoimmune hepatitis, demyelinating diseases, lymphoma, and serious infections.  The patient understands that monitoring is required including a PPD at baseline and must alert us or the primary physician if symptoms of infection or other concerning signs are noted. Albendazole Counseling:  I discussed with the patient the risks of albendazole including but not limited to cytopenia, kidney damage, nausea/vomiting and severe allergy.  The patient understands that this medication is being used in an off-label manner. Topical Clindamycin Counseling: Patient counseled that this medication may cause skin irritation or allergic reactions.  In the event of skin irritation, the patient was advised to reduce the amount of the drug applied or use it less frequently.   The patient verbalized understanding of the proper use and possible adverse effects of clindamycin.  All of the patient's questions and concerns were addressed. Doxycycline Counseling:  Patient counseled regarding possible photosensitivity and increased risk for sunburn.  Patient instructed to avoid sunlight, if possible.  When exposed to sunlight, patients should wear protective clothing, sunglasses, and sunscreen.  The patient was instructed to call the office immediately if the following severe adverse effects occur:  hearing changes, easy bruising/bleeding, severe headache, or vision changes.  The patient verbalized understanding of the proper use and possible adverse effects of doxycycline.  All of the patient's questions and concerns were addressed. Cosentyx Pregnancy And Lactation Text: This medication is Pregnancy Category B and is considered safe during pregnancy. It is unknown if this medication is excreted in breast milk. Opzelura Counseling:  I discussed with the patient the risks of Opzelura including but not limited to nasopharngitis, bronchitis, ear infection, eosinophila, hives, diarrhea, folliculitis, tonsillitis, and rhinorrhea.  Taken orally, this medication has been linked to serious infections; higher rate of mortality; malignancy and lymphoproliferative disorders; major adverse cardiovascular events; thrombosis; thrombocytopenia, anemia, and neutropenia; and lipid elevations. Fluconazole Pregnancy And Lactation Text: This medication is Pregnancy Category C and it isn't know if it is safe during pregnancy. It is also excreted in breast milk. Finasteride Pregnancy And Lactation Text: This medication is absolutely contraindicated during pregnancy. It is unknown if it is excreted in breast milk. Azelaic Acid Counseling: Patient counseled that medicine may cause skin irritation and to avoid applying near the eyes.  In the event of skin irritation, the patient was advised to reduce the amount of the drug applied or use it less frequently.   The patient verbalized understanding of the proper use and possible adverse effects of azelaic acid.  All of the patient's questions and concerns were addressed. Terbinafine Counseling: Patient counseling regarding adverse effects of terbinafine including but not limited to headache, diarrhea, rash, upset stomach, liver function test abnormalities, itching, taste/smell disturbance, nausea, abdominal pain, and flatulence.  There is a rare possibility of liver failure that can occur when taking terbinafine.  The patient understands that a baseline LFT and kidney function test may be required. The patient verbalized understanding of the proper use and possible adverse effects of terbinafine.  All of the patient's questions and concerns were addressed. Gabapentin Pregnancy And Lactation Text: This medication is Pregnancy Category C and isn't considered safe during pregnancy. It is excreted in breast milk. Bexarotene Counseling:  I discussed with the patient the risks of bexarotene including but not limited to hair loss, dry lips/skin/eyes, liver abnormalities, hyperlipidemia, pancreatitis, depression/suicidal ideation, photosensitivity, drug rash/allergic reactions, hypothyroidism, anemia, leukopenia, infection, cataracts, and teratogenicity.  Patient understands that they will need regular blood tests to check lipid profile, liver function tests, white blood cell count, thyroid function tests and pregnancy test if applicable. Qbrexza Pregnancy And Lactation Text: There is no available data on Qbrexza use in pregnant women.  There is no available data on Qbrexza use in lactation. Arava Counseling:  Patient counseled regarding adverse effects of Arava including but not limited to nausea, vomiting, abnormalities in liver function tests. Patients may develop mouth sores, rash, diarrhea, and abnormalities in blood counts. The patient understands that monitoring is required including LFTs and blood counts.  There is a rare possibility of scarring of the liver and lung problems that can occur when taking methotrexate. Persistent nausea, loss of appetite, pale stools, dark urine, cough, and shortness of breath should be reported immediately. Patient advised to discontinue Arava treatment and consult with a physician prior to attempting conception. The patient will have to undergo a treatment to eliminate Arava from the body prior to conception. Propranolol Counseling:  I discussed with the patient the risks of propranolol including but not limited to low heart rate, low blood pressure, low blood sugar, restlessness and increased cold sensitivity. They should call the office if they experience any of these side effects. Hydroquinone Pregnancy And Lactation Text: This medication has not been assigned a Pregnancy Risk Category but animal studies failed to show danger with the topical medication. It is unknown if the medication is excreted in breast milk. Stelara Counseling:  I discussed with the patient the risks of ustekinumab including but not limited to immunosuppression, malignancy, posterior leukoencephalopathy syndrome, and serious infections.  The patient understands that monitoring is required including a PPD at baseline and must alert us or the primary physician if symptoms of infection or other concerning signs are noted. Erivedge Pregnancy And Lactation Text: This medication is Pregnancy Category X and is absolutely contraindicated during pregnancy. It is unknown if it is excreted in breast milk. Olumiant Counseling: I discussed with the patient the risks of Olumiant therapy including but not limited to upper respiratory tract infections, shingles, cold sores, and nausea. Live vaccines should be avoided.  This medication has been linked to serious infections; higher rate of mortality; malignancy and lymphoproliferative disorders; major adverse cardiovascular events; thrombosis; gastrointestinal perforations; neutropenia; lymphopenia; anemia; liver enzyme elevations; and lipid elevations. 5-Fu Counseling: 5-Fluorouracil Counseling:  I discussed with the patient the risks of 5-fluorouracil including but not limited to erythema, scaling, itching, weeping, crusting, and pain. Use Enhanced Medication Counseling?: No VTAMA Counseling: I discussed with the patient that VTAMA is not for use in the eyes, mouth or mouth. They should call the office if they develop any signs of allergic reactions to VTAMA. The patient verbalized understanding of the proper use and possible adverse effects of VTAMA.  All of the patient's questions and concerns were addressed. Albendazole Pregnancy And Lactation Text: This medication is Pregnancy Category C and it isn't known if it is safe during pregnancy. It is also excreted in breast milk. Nsaids Pregnancy And Lactation Text: These medications are considered safe up to 30 weeks gestation. It is excreted in breast milk. Birth Control Pills Counseling: Birth Control Pill Counseling: I discussed with the patient the potential side effects of OCPs including but not limited to increased risk of stroke, heart attack, thrombophlebitis, deep venous thrombosis, hepatic adenomas, breast changes, GI upset, headaches, and depression.  The patient verbalized understanding of the proper use and possible adverse effects of OCPs. All of the patient's questions and concerns were addressed. Cyclophosphamide Pregnancy And Lactation Text: This medication is Pregnancy Category D and it isn't considered safe during pregnancy. This medication is excreted in breast milk. Doxycycline Pregnancy And Lactation Text: This medication is Pregnancy Category D and not consider safe during pregnancy. It is also excreted in breast milk but is considered safe for shorter treatment courses. Topical Clindamycin Pregnancy And Lactation Text: This medication is Pregnancy Category B and is considered safe during pregnancy. It is unknown if it is excreted in breast milk. Griseofulvin Counseling:  I discussed with the patient the risks of griseofulvin including but not limited to photosensitivity, cytopenia, liver damage, nausea/vomiting and severe allergy.  The patient understands that this medication is best absorbed when taken with a fatty meal (e.g., ice cream or french fries). Glycopyrrolate Counseling:  I discussed with the patient the risks of glycopyrrolate including but not limited to skin rash, drowsiness, dry mouth, difficulty urinating, and blurred vision. Azelaic Acid Pregnancy And Lactation Text: This medication is considered safe during pregnancy and breast feeding. Terbinafine Pregnancy And Lactation Text: This medication is Pregnancy Category B and is considered safe during pregnancy. It is also excreted in breast milk and breast feeding isn't recommended. Dupixent Counseling: I discussed with the patient the risks of dupilumab including but not limited to eye infection and irritation, cold sores, injection site reactions, worsening of asthma, allergic reactions and increased risk of parasitic infection.  Live vaccines should be avoided while taking dupilumab. Dupilumab will also interact with certain medications such as warfarin and cyclosporine. The patient understands that monitoring is required and they must alert us or the primary physician if symptoms of infection or other concerning signs are noted. Bexarotene Pregnancy And Lactation Text: This medication is Pregnancy Category X and should not be given to women who are pregnant or may become pregnant. This medication should not be used if you are breast feeding. Opzelura Pregnancy And Lactation Text: There is insufficient data to evaluate drug-associated risk for major birth defects, miscarriage, or other adverse maternal or fetal outcomes.  There is a pregnancy registry that monitors pregnancy outcomes in pregnant persons exposed to the medication during pregnancy.  It is unknown if this medication is excreted in breast milk.  Do not breastfeed during treatment and for about 4 weeks after the last dose. Acitretin Counseling:  I discussed with the patient the risks of acitretin including but not limited to hair loss, dry lips/skin/eyes, liver damage, hyperlipidemia, depression/suicidal ideation, photosensitivity.  Serious rare side effects can include but are not limited to pancreatitis, pseudotumor cerebri, bony changes, clot formation/stroke/heart attack.  Patient understands that alcohol is contraindicated since it can result in liver toxicity and significantly prolong the elimination of the drug by many years. Rhofade Counseling: Rhofade is a topical medication which can decrease superficial blood flow where applied. Side effects are uncommon and include stinging, redness and allergic reactions. Azithromycin Counseling:  I discussed with the patient the risks of azithromycin including but not limited to GI upset, allergic reaction, drug rash, diarrhea, and yeast infections. Imiquimod Counseling:  I discussed with the patient the risks of imiquimod including but not limited to erythema, scaling, itching, weeping, crusting, and pain.  Patient understands that the inflammatory response to imiquimod is variable from person to person and was educated regarded proper titration schedule.  If flu-like symptoms develop, patient knows to discontinue the medication and contact us. Propranolol Pregnancy And Lactation Text: This medication is Pregnancy Category C and it isn't known if it is safe during pregnancy. It is excreted in breast milk. Libtayo Counseling- I discussed with the patient the risks of Libtayo including but not limited to nausea, vomiting, diarrhea, and bone or muscle pain.  The patient verbalized understanding of the proper use and possible adverse effects of Libtayo.  All of the patient's questions and concerns were addressed. Cibinqo Pregnancy And Lactation Text: It is unknown if this medication will adversely affect pregnancy or breast feeding.  You should not take this medication if you are currently pregnant or planning a pregnancy or while breastfeeding. Valtrex Pregnancy And Lactation Text: this medication is Pregnancy Category B and is considered safe during pregnancy. This medication is not directly found in breast milk but it's metabolite acyclovir is present. Vtama Pregnancy And Lactation Text: It is unknown if this medication can cause problems during pregnancy and breastfeeding. Rituxan Counseling:  I discussed with the patient the risks of Rituxan infusions. Side effects can include infusion reactions, severe drug rashes including mucocutaneous reactions, reactivation of latent hepatitis and other infections and rarely progressive multifocal leukoencephalopathy.  All of the patient's questions and concerns were addressed. 5-Fu Pregnancy And Lactation Text: This medication is Pregnancy Category X and contraindicated in pregnancy and in women who may become pregnant. It is unknown if this medication is excreted in breast milk. Birth Control Pills Pregnancy And Lactation Text: This medication should be avoided if pregnant and for the first 30 days post-partum. Cyclophosphamide Counseling:  I discussed with the patient the risks of cyclophosphamide including but not limited to hair loss, hormonal abnormalities, decreased fertility, abdominal pain, diarrhea, nausea and vomiting, bone marrow suppression and infection. The patient understands that monitoring is required while taking this medication. Erythromycin Counseling:  I discussed with the patient the risks of erythromycin including but not limited to GI upset, allergic reaction, drug rash, diarrhea, increase in liver enzymes, and yeast infections. Ivermectin Counseling:  Patient instructed to take medication on an empty stomach with a full glass of water.  Patient informed of potential adverse effects including but not limited to nausea, diarrhea, dizziness, itching, and swelling of the extremities or lymph nodes.  The patient verbalized understanding of the proper use and possible adverse effects of ivermectin.  All of the patient's questions and concerns were addressed. Rifampin Counseling: I discussed with the patient the risks of rifampin including but not limited to liver damage, kidney damage, red-orange body fluids, nausea/vomiting and severe allergy. Topical Ketoconazole Counseling: Patient counseled that this medication may cause skin irritation or allergic reactions.  In the event of skin irritation, the patient was advised to reduce the amount of the drug applied or use it less frequently.   The patient verbalized understanding of the proper use and possible adverse effects of ketoconazole.  All of the patient's questions and concerns were addressed. Olanzapine Counseling- I discussed with the patient the common side effects of olanzapine including but are not limited to: lack of energy, dry mouth, increased appetite, sleepiness, tremor, constipation, dizziness, changes in behavior, or restlessness.  Explained that teenagers are more likely to experience headaches, abdominal pain, pain in the arms or legs, tiredness, and sleepiness.  Serious side effects include but are not limited: increased risk of death in elderly patients who are confused, have memory loss, or dementia-related psychosis; hyperglycemia; increased cholesterol and triglycerides; and weight gain. Griseofulvin Pregnancy And Lactation Text: This medication is Pregnancy Category X and is known to cause serious birth defects. It is unknown if this medication is excreted in breast milk but breast feeding should be avoided. Glycopyrrolate Pregnancy And Lactation Text: This medication is Pregnancy Category B and is considered safe during pregnancy. It is unknown if it is excreted breast milk. Benzoyl Peroxide Counseling: Patient counseled that medicine may cause skin irritation and bleach clothing.  In the event of skin irritation, the patient was advised to reduce the amount of the drug applied or use it less frequently.   The patient verbalized understanding of the proper use and possible adverse effects of benzoyl peroxide.  All of the patient's questions and concerns were addressed. Dupixent Pregnancy And Lactation Text: This medication likely crosses the placenta but the risk for the fetus is uncertain. This medication is excreted in breast milk. Isotretinoin Counseling: Patient should get monthly blood tests, not donate blood, not drive at night if vision affected, not share medication, and not undergo elective surgery for 6 months after tx completed. Side effects reviewed, pt to contact office should one occur. Picato Counseling:  I discussed with the patient the risks of Picato including but not limited to erythema, scaling, itching, weeping, crusting, and pain. Azithromycin Pregnancy And Lactation Text: This medication is considered safe during pregnancy and is also secreted in breast milk. Rhofade Pregnancy And Lactation Text: This medication has not been assigned a Pregnancy Risk Category. It is unknown if the medication is excreted in breast milk. Imiquimod Pregnancy And Lactation Text: This medication is Pregnancy Category C. It is unknown if this medication is excreted in breast milk. Clofazimine Counseling:  I discussed with the patient the risks of clofazimine including but not limited to skin and eye pigmentation, liver damage, nausea/vomiting, gastrointestinal bleeding and allergy. Cibinqo Counseling: I discussed with the patient the risks of Cibinqo therapy including but not limited to common cold, nausea, headache, cold sores, increased blood CPK levels, dizziness, UTIs, fatigue, acne, and vomitting. Live vaccines should be avoided.  This medication has been linked to serious infections; higher rate of mortality; malignancy and lymphoproliferative disorders; major adverse cardiovascular events; thrombosis; thrombocytopenia and lymphopenia; lipid elevations; and retinal detachment. Taltz Counseling: I discussed with the patient the risks of ixekizumab including but not limited to immunosuppression, serious infections, worsening of inflammatory bowel disease and drug reactions.  The patient understands that monitoring is required including a PPD at baseline and must alert us or the primary physician if symptoms of infection or other concerning signs are noted. SSKI Counseling:  I discussed with the patient the risks of SSKI including but not limited to thyroid abnormalities, metallic taste, GI upset, fever, headache, acne, arthralgias, paraesthesias, lymphadenopathy, easy bleeding, arrhythmias, and allergic reaction. Valtrex Counseling: I discussed with the patient the risks of valacyclovir including but not limited to kidney damage, nausea, vomiting and severe allergy.  The patient understands that if the infection seems to be worsening or is not improving, they are to call. Rituxan Pregnancy And Lactation Text: This medication is Pregnancy Category C and it isn't know if it is safe during pregnancy. It is unknown if this medication is excreted in breast milk but similar antibodies are known to be excreted. Zoryve Counseling:  I discussed with the patient that Zoryve is not for use in the eyes, mouth or vagina. The most commonly reported side effects include diarrhea, headache, insomnia, application site pain, upper respiratory tract infections, and urinary tract infections.  All of the patient's questions and concerns were addressed. Libtayo Pregnancy And Lactation Text: This medication is contraindicated in pregnancy and when breast feeding. Cimetidine Counseling:  I discussed with the patient the risks of Cimetidine including but not limited to gynecomastia, headache, diarrhea, nausea, drowsiness, arrhythmias, pancreatitis, skin rashes, psychosis, bone marrow suppression and kidney toxicity. Olanzapine Pregnancy And Lactation Text: This medication is pregnancy category C.   There are no adequate and well controlled trials with olanzapine in pregnant females.  Olanzapine should be used during pregnancy only if the potential benefit justifies the potential risk to the fetus.   In a study in lactating healthy women, olanzapine was excreted in breast milk.  It is recommended that women taking olanzapine should not breast feed. Drysol Counseling:  I discussed with the patient the risks of drysol/aluminum chloride including but not limited to skin rash, itching, irritation, burning. Rifampin Pregnancy And Lactation Text: This medication is Pregnancy Category C and it isn't know if it is safe during pregnancy. It is also excreted in breast milk and should not be used if you are breast feeding. Cellcept Pregnancy And Lactation Text: This medication is Pregnancy Category D and isn't considered safe during pregnancy. It is unknown if this medication is excreted in breast milk. Erythromycin Pregnancy And Lactation Text: This medication is Pregnancy Category B and is considered safe during pregnancy. It is also excreted in breast milk. Xelmattiez Pregnancy And Lactation Text: This medication is Pregnancy Category D and is not considered safe during pregnancy.  The risk during breast feeding is also uncertain. Spironolactone Counseling: Patient advised regarding risks of diarrhea, abdominal pain, hyperkalemia, birth defects (for female patients), liver toxicity and renal toxicity. The patient may need blood work to monitor liver and kidney function and potassium levels while on therapy. The patient verbalized understanding of the proper use and possible adverse effects of spironolactone.  All of the patient's questions and concerns were addressed. Benzoyl Peroxide Pregnancy And Lactation Text: This medication is Pregnancy Category C. It is unknown if benzoyl peroxide is excreted in breast milk. Hydroxychloroquine Counseling:  I discussed with the patient that a baseline ophthalmologic exam is needed at the start of therapy and every year thereafter while on therapy. A CBC may also be warranted for monitoring.  The side effects of this medication were discussed with the patient, including but not limited to agranulocytosis, aplastic anemia, seizures, rashes, retinopathy, and liver toxicity. Patient instructed to call the office should any adverse effect occur.  The patient verbalized understanding of the proper use and possible adverse effects of Plaquenil.  All the patient's questions and concerns were addressed. Itraconazole Counseling:  I discussed with the patient the risks of itraconazole including but not limited to liver damage, nausea/vomiting, neuropathy, and severe allergy.  The patient understands that this medication is best absorbed when taken with acidic beverages such as non-diet cola or ginger ale.  The patient understands that monitoring is required including baseline LFTs and repeat LFTs at intervals.  The patient understands that they are to contact us or the primary physician if concerning signs are noted. Enbrel Counseling:  I discussed with the patient the risks of etanercept including but not limited to myelosuppression, immunosuppression, autoimmune hepatitis, demyelinating diseases, lymphoma, and infections.  The patient understands that monitoring is required including a PPD at baseline and must alert us or the primary physician if symptoms of infection or other concerning signs are noted. Bactrim Counseling:  I discussed with the patient the risks of sulfa antibiotics including but not limited to GI upset, allergic reaction, drug rash, diarrhea, dizziness, photosensitivity, and yeast infections.  Rarely, more serious reactions can occur including but not limited to aplastic anemia, agranulocytosis, methemoglobinemia, blood dyscrasias, liver or kidney failure, lung infiltrates or desquamative/blistering drug rashes. Solaraze Counseling:  I discussed with the patient the risks of Solaraze including but not limited to erythema, scaling, itching, weeping, crusting, and pain. Klisyri Counseling:  I discussed with the patient the risks of Klisyri including but not limited to erythema, scaling, itching, weeping, crusting, and pain. Isotretinoin Pregnancy And Lactation Text: This medication is Pregnancy Category X and is considered extremely dangerous during pregnancy. It is unknown if it is excreted in breast milk. Taltz Pregnancy And Lactation Text: The risk during pregnancy and breastfeeding is uncertain with this medication. Tranexamic Acid Pregnancy And Lactation Text: It is unknown if this medication is safe during pregnancy or breast feeding. Sski Pregnancy And Lactation Text: This medication is Pregnancy Category D and isn't considered safe during pregnancy. It is excreted in breast milk. Oral Minoxidil Counseling- I discussed with the patient the risks of oral minoxidil including but not limited to shortness of breath, swelling of the feet or ankles, dizziness, lightheadedness, unwanted hair growth and allergic reaction.  The patient verbalized understanding of the proper use and possible adverse effects of oral minoxidil.  All of the patient's questions and concerns were addressed. Spironolactone Pregnancy And Lactation Text: This medication can cause feminization of the male fetus and should be avoided during pregnancy. The active metabolite is also found in breast milk. Cellcept Counseling:  I discussed with the patient the risks of mycophenolate mofetil including but not limited to infection/immunosuppression, GI upset, hypokalemia, hypercholesterolemia, bone marrow suppression, lymphoproliferative disorders, malignancy, GI ulceration/bleed/perforation, colitis, interstitial lung disease, kidney failure, progressive multifocal leukoencephalopathy, and birth defects.  The patient understands that monitoring is required including a baseline creatinine and regular CBC testing. In addition, patient must alert us immediately if symptoms of infection or other concerning signs are noted. Sarecycline Counseling: Patient advised regarding possible photosensitivity and discoloration of the teeth, skin, lips, tongue and gums.  Patient instructed to avoid sunlight, if possible.  When exposed to sunlight, patients should wear protective clothing, sunglasses, and sunscreen.  The patient was instructed to call the office immediately if the following severe adverse effects occur:  hearing changes, easy bruising/bleeding, severe headache, or vision changes.  The patient verbalized understanding of the proper use and possible adverse effects of sarecycline.  All of the patient's questions and concerns were addressed. Odomzo Counseling- I discussed with the patient the risks of Odomzo including but not limited to nausea, vomiting, diarrhea, constipation, weight loss, changes in the sense of taste, decreased appetite, muscle spasms, and hair loss.  The patient verbalized understanding of the proper use and possible adverse effects of Odomzo.  All of the patient's questions and concerns were addressed. Xeljanz Counseling: I discussed with the patient the risks of Xeljanz therapy including increased risk of infection, liver issues, headache, diarrhea, or cold symptoms. Live vaccines should be avoided. They were instructed to call if they have any problems. Siliq Counseling:  I discussed with the patient the risks of Siliq including but not limited to new or worsening depression, suicidal thoughts and behavior, immunosuppression, malignancy, posterior leukoencephalopathy syndrome, and serious infections.  The patient understands that monitoring is required including a PPD at baseline and must alert us or the primary physician if symptoms of infection or other concerning signs are noted. There is also a special program designed to monitor depression which is required with Siliq. Carac Counseling:  I discussed with the patient the risks of Carac including but not limited to erythema, scaling, itching, weeping, crusting, and pain. Topical Sulfur Applications Counseling: Topical Sulfur Counseling: Patient counseled that this medication may cause skin irritation or allergic reactions.  In the event of skin irritation, the patient was advised to reduce the amount of the drug applied or use it less frequently.   The patient verbalized understanding of the proper use and possible adverse effects of topical sulfur application.  All of the patient's questions and concerns were addressed. Solaraze Pregnancy And Lactation Text: This medication is Pregnancy Category B and is considered safe. There is some data to suggest avoiding during the third trimester. It is unknown if this medication is excreted in breast milk. Prednisone Pregnancy And Lactation Text: This medication is Pregnancy Category C and it isn't know if it is safe during pregnancy. This medication is excreted in breast milk. Bactrim Pregnancy And Lactation Text: This medication is Pregnancy Category D and is known to cause fetal risk.  It is also excreted in breast milk. Hydroxychloroquine Pregnancy And Lactation Text: This medication has been shown to cause fetal harm but it isn't assigned a Pregnancy Risk Category. There are small amounts excreted in breast milk. High Dose Vitamin A Counseling: Side effects reviewed, pt to contact office should one occur. Tremfya Counseling: I discussed with the patient the risks of guselkumab including but not limited to immunosuppression, serious infections, and drug reactions.  The patient understands that monitoring is required including a PPD at baseline and must alert us or the primary physician if symptoms of infection or other concerning signs are noted. Colchicine Counseling:  Patient counseled regarding adverse effects including but not limited to stomach upset (nausea, vomiting, stomach pain, or diarrhea).  Patient instructed to limit alcohol consumption while taking this medication.  Colchicine may reduce blood counts especially with prolonged use.  The patient understands that monitoring of kidney function and blood counts may be required, especially at baseline. The patient verbalized understanding of the proper use and possible adverse effects of colchicine.  All of the patient's questions and concerns were addressed. Thalidomide Counseling: I discussed with the patient the risks of thalidomide including but not limited to birth defects, anxiety, weakness, chest pain, dizziness, cough and severe allergy. Adbry Counseling: I discussed with the patient the risks of tralokinumab including but not limited to eye infection and irritation, cold sores, injection site reactions, worsening of asthma, allergic reactions and increased risk of parasitic infection.  Live vaccines should be avoided while taking tralokinumab. The patient understands that monitoring is required and they must alert us or the primary physician if symptoms of infection or other concerning signs are noted. Klisyri Pregnancy And Lactation Text: It is unknown if this medication can harm a developing fetus or if it is excreted in breast milk. Zyclara Counseling:  I discussed with the patient the risks of imiquimod including but not limited to erythema, scaling, itching, weeping, crusting, and pain.  Patient understands that the inflammatory response to imiquimod is variable from person to person and was educated regarded proper titration schedule.  If flu-like symptoms develop, patient knows to discontinue the medication and contact us. Doxepin Counseling:  Patient advised that the medication is sedating and not to drive a car after taking this medication. Patient informed of potential adverse effects including but not limited to dry mouth, urinary retention, and blurry vision.  The patient verbalized understanding of the proper use and possible adverse effects of doxepin.  All of the patient's questions and concerns were addressed. Tranexamic Acid Counseling:  Patient advised of the small risk of bleeding problems with tranexamic acid. They were also instructed to call if they developed any nausea, vomiting or diarrhea. All of the patient's questions and concerns were addressed. Elidel Counseling: Patient may experience a mild burning sensation during topical application. Elidel is not approved in children less than 2 years of age. There have been case reports of hematologic and skin malignancies in patients using topical calcineurin inhibitors although causality is questionable. Sarecycline Pregnancy And Lactation Text: This medication is Pregnancy Category D and not consider safe during pregnancy. It is also excreted in breast milk. Oral Minoxidil Pregnancy And Lactation Text: This medication should only be used when clearly needed if you are pregnant, attempting to become pregnant or breast feeding. Sotyktu Pregnancy And Lactation Text: There is insufficient data to evaluate whether or not Sotyktu is safe to use during pregnancy.   It is not known if Sotyktu passes into breast milk and whether or not it is safe to use when breastfeeding.   Humira Counseling:  I discussed with the patient the risks of adalimumab including but not limited to myelosuppression, immunosuppression, autoimmune hepatitis, demyelinating diseases, lymphoma, and serious infections.  The patient understands that monitoring is required including a PPD at baseline and must alert us or the primary physician if symptoms of infection or other concerning signs are noted. Topical Sulfur Applications Pregnancy And Lactation Text: This medication is considered safe during pregnancy and breast feeding secondary to limited systemic absorption. Ketoconazole Counseling:   Patient counseled regarding improving absorption with orange juice.  Adverse effects include but are not limited to breast enlargement, headache, diarrhea, nausea, upset stomach, liver function test abnormalities, taste disturbance, and stomach pain.  There is a rare possibility of liver failure that can occur when taking ketoconazole. The patient understands that monitoring of LFTs may be required, especially at baseline. The patient verbalized understanding of the proper use and possible adverse effects of ketoconazole.  All of the patient's questions and concerns were addressed. Low Dose Naltrexone Counseling- I discussed with the patient the potential risks and side effects of low dose naltrexone including but not limited to: more vivid dreams, headaches, nausea, vomiting, abdominal pain, fatigue, dizziness, and anxiety. Topical Retinoid counseling:  Patient advised to apply a pea-sized amount only at bedtime and wait 30 minutes after washing their face before applying.  If too drying, patient may add a non-comedogenic moisturizer. The patient verbalized understanding of the proper use and possible adverse effects of retinoids.  All of the patient's questions and concerns were addressed. Prednisone Counseling:  I discussed with the patient the risks of prolonged use of prednisone including but not limited to weight gain, insomnia, osteoporosis, mood changes, diabetes, susceptibility to infection, glaucoma and high blood pressure.  In cases where prednisone use is prolonged, patients should be monitored with blood pressure checks, serum glucose levels and an eye exam.  Additionally, the patient may need to be placed on GI prophylaxis, PCP prophylaxis, and calcium and vitamin D supplementation and/or a bisphosphonate.  The patient verbalized understanding of the proper use and the possible adverse effects of prednisone.  All of the patient's questions and concerns were addressed. Metronidazole Counseling:  I discussed with the patient the risks of metronidazole including but not limited to seizures, nausea/vomiting, a metallic taste in the mouth, nausea/vomiting and severe allergy. Cephalexin Counseling: I counseled the patient regarding use of cephalexin as an antibiotic for prophylactic and/or therapeutic purposes. Cephalexin (commonly prescribed under brand name Keflex) is a cephalosporin antibiotic which is active against numerous classes of bacteria, including most skin bacteria. Side effects may include nausea, diarrhea, gastrointestinal upset, rash, hives, yeast infections, and in rare cases, hepatitis, kidney disease, seizures, fever, confusion, neurologic symptoms, and others. Patients with severe allergies to penicillin medications are cautioned that there is about a 10% incidence of cross-reactivity with cephalosporins. When possible, patients with penicillin allergies should use alternatives to cephalosporins for antibiotic therapy. High Dose Vitamin A Pregnancy And Lactation Text: High dose vitamin A therapy is contraindicated during pregnancy and breast feeding. Adbry Pregnancy And Lactation Text: It is unknown if this medication will adversely affect pregnancy or breast feeding. Minoxidil Counseling: Minoxidil is a topical medication which can increase blood flow where it is applied. It is uncertain how this medication increases hair growth. Side effects are uncommon and include stinging and allergic reactions. Azathioprine Counseling:  I discussed with the patient the risks of azathioprine including but not limited to myelosuppression, immunosuppression, hepatotoxicity, lymphoma, and infections.  The patient understands that monitoring is required including baseline LFTs, Creatinine, possible TPMP genotyping and weekly CBCs for the first month and then every 2 weeks thereafter.  The patient verbalized understanding of the proper use and possible adverse effects of azathioprine.  All of the patient's questions and concerns were addressed. Doxepin Pregnancy And Lactation Text: This medication is Pregnancy Category C and it isn't known if it is safe during pregnancy. It is also excreted in breast milk and breast feeding isn't recommended. Otezla Counseling: The side effects of Otezla were discussed with the patient, including but not limited to worsening or new depression, weight loss, diarrhea, nausea, upper respiratory tract infection, and headache. Patient instructed to call the office should any adverse effect occur.  The patient verbalized understanding of the proper use and possible adverse effects of Otezla.  All the patient's questions and concerns were addressed. Tetracycline Counseling: Patient counseled regarding possible photosensitivity and increased risk for sunburn.  Patient instructed to avoid sunlight, if possible.  When exposed to sunlight, patients should wear protective clothing, sunglasses, and sunscreen.  The patient was instructed to call the office immediately if the following severe adverse effects occur:  hearing changes, easy bruising/bleeding, severe headache, or vision changes.  The patient verbalized understanding of the proper use and possible adverse effects of tetracycline.  All of the patient's questions and concerns were addressed. Patient understands to avoid pregnancy while on therapy due to potential birth defects. Simponi Counseling:  I discussed with the patient the risks of golimumab including but not limited to myelosuppression, immunosuppression, autoimmune hepatitis, demyelinating diseases, lymphoma, and serious infections.  The patient understands that monitoring is required including a PPD at baseline and must alert us or the primary physician if symptoms of infection or other concerning signs are noted. Sotyktu Counseling:  I discussed the most common side effects of Sotyktu including: common cold, sore throat, sinus infections, cold sores, canker sores, folliculitis, and acne.  I also discussed more serious side effects of Sotyktu including but not limited to: serious allergic reactions; increased risk for infections such as TB; cancers such as lymphomas; rhabdomyolysis and elevated CPK; and elevated triglycerides and liver enzymes.  Wartpeel Counseling:  I discussed with the patient the risks of Wartpeel including but not limited to erythema, scaling, itching, weeping, crusting, and pain. Low Dose Naltrexone Pregnancy And Lactation Text: Naltrexone is pregnancy category C.  There have been no adequate and well-controlled studies in pregnant women.  It should be used in pregnancy only if the potential benefit justifies the potential risk to the fetus.   Limited data indicates that naltrexone is minimally excreted into breastmilk. Calcipotriene Counseling: Cantharidin Counseling:  I discussed with the patient the risks of Cantharidin including but not limited to pain, redness, burning, itching, and blistering. Methotrexate Pregnancy And Lactation Text: This medication is Pregnancy Category X and is known to cause fetal harm. This medication is excreted in breast milk. Dutasteride Male Counseling: Dustasteride Counseling:  I discussed with the patient the risks of use of dutasteride including but not limited to decreased libido, decreased ejaculate volume, and gynecomastia. Women who can become pregnant should not handle medication.  All of the patient's questions and concerns were addressed. Cephalexin Pregnancy And Lactation Text: This medication is Pregnancy Category B and considered safe during pregnancy.  It is also excreted in breast milk but can be used safely for shorter doses. Metronidazole Pregnancy And Lactation Text: This medication is Pregnancy Category B and considered safe during pregnancy.  It is also excreted in breast milk. Dapsone Counseling: I discussed with the patient the risks of dapsone including but not limited to hemolytic anemia, agranulocytosis, rashes, methemoglobinemia, kidney failure, peripheral neuropathy, headaches, GI upset, and liver toxicity.  Patients who start dapsone require monitoring including baseline LFTs and weekly CBCs for the first month, then every month thereafter.  The patient verbalized understanding of the proper use and possible adverse effects of dapsone.  All of the patient's questions and concerns were addressed. Detail Level: Simple Cimzia Counseling:  I discussed with the patient the risks of Cimzia including but not limited to immunosuppression, allergic reactions and infections.  The patient understands that monitoring is required including a PPD at baseline and must alert us or the primary physician if symptoms of infection or other concerning signs are noted. Protopic Counseling: Patient may experience a mild burning sensation during topical application. Protopic is not approved in children less than 2 years of age. There have been case reports of hematologic and skin malignancies in patients using topical calcineurin inhibitors although causality is questionable. Xolair Counseling:  Patient informed of potential adverse effects including but not limited to fever, muscle aches, rash and allergic reactions.  The patient verbalized understanding of the proper use and possible adverse effects of Xolair.  All of the patient's questions and concerns were addressed. Hydroxyzine Counseling: Patient advised that the medication is sedating and not to drive a car after taking this medication.  Patient informed of potential adverse effects including but not limited to dry mouth, urinary retention, and blurry vision.  The patient verbalized understanding of the proper use and possible adverse effects of hydroxyzine.  All of the patient's questions and concerns were addressed. Eucrisa Counseling: Patient may experience a mild burning sensation during topical application. Eucrisa is not approved in children less than 3 months of age. Rinvoq Pregnancy And Lactation Text: Based on animal studies, Rinvoq may cause embryo-fetal harm when administered to pregnant women.  The medication should not be used in pregnancy.  Breastfeeding is not recommended during treatment and for 6 days after the last dose. Otezla Pregnancy And Lactation Text: This medication is Pregnancy Category C and it isn't known if it is safe during pregnancy. It is unknown if it is excreted in breast milk. Calcipotriene Pregnancy And Lactation Text: This medication has not been proven safe during pregnancy. It is unknown if this medication is excreted in breast milk. Methotrexate Counseling:  Patient counseled regarding adverse effects of methotrexate including but not limited to nausea, vomiting, abnormalities in liver function tests. Patients may develop mouth sores, rash, diarrhea, and abnormalities in blood counts. The patient understands that monitoring is required including LFT's and blood counts.  There is a rare possibility of scarring of the liver and lung problems that can occur when taking methotrexate. Persistent nausea, loss of appetite, pale stools, dark urine, cough, and shortness of breath should be reported immediately. Patient advised to discontinue methotrexate treatment at least three months before attempting to become pregnant.  I discussed the need for folate supplements while taking methotrexate.  These supplements can decrease side effects during methotrexate treatment. The patient verbalized understanding of the proper use and possible adverse effects of methotrexate.  All of the patient's questions and concerns were addressed. Minocycline Counseling: Patient advised regarding possible photosensitivity and discoloration of the teeth, skin, lips, tongue and gums.  Patient instructed to avoid sunlight, if possible.  When exposed to sunlight, patients should wear protective clothing, sunglasses, and sunscreen.  The patient was instructed to call the office immediately if the following severe adverse effects occur:  hearing changes, easy bruising/bleeding, severe headache, or vision changes.  The patient verbalized understanding of the proper use and possible adverse effects of minocycline.  All of the patient's questions and concerns were addressed. Dutasteride Pregnancy And Lactation Text: This medication is absolutely contraindicated in women, especially during pregnancy and breast feeding. Feminization of male fetuses is possible if taking while pregnant. Opioid Counseling: I discussed with the patient the potential side effects of opioids including but not limited to addiction, altered mental status, and depression. I stressed avoiding alcohol, benzodiazepines, muscle relaxants and sleep aids unless specifically okayed by a physician. The patient verbalized understanding of the proper use and possible adverse effects of opioids. All of the patient's questions and concerns were addressed. They were instructed to flush the remaining pills down the toilet if they did not need them for pain. Ilumya Counseling: I discussed with the patient the risks of tildrakizumab including but not limited to immunosuppression, malignancy, posterior leukoencephalopathy syndrome, and serious infections.  The patient understands that monitoring is required including a PPD at baseline and must alert us or the primary physician if symptoms of infection or other concerning signs are noted. Niacinamide Counseling: I recommended taking niacin or niacinamide, also know as vitamin B3, twice daily. Recent evidence suggests that taking vitamin B3 (500 mg twice daily) can reduce the risk of actinic keratoses and non-melanoma skin cancers. Side effects of vitamin B3 include flushing and headache. Aklief counseling:  Patient advised to apply a pea-sized amount only at bedtime and wait 30 minutes after washing their face before applying.  If too drying, patient may add a non-comedogenic moisturizer.  The most commonly reported side effects including irritation, redness, scaling, dryness, stinging, burning, itching, and increased risk of sunburn.  The patient verbalized understanding of the proper use and possible adverse effects of retinoids.  All of the patient's questions and concerns were addressed. Clindamycin Counseling: I counseled the patient regarding use of clindamycin as an antibiotic for prophylactic and/or therapeutic purposes. Clindamycin is active against numerous classes of bacteria, including skin bacteria. Side effects may include nausea, diarrhea, gastrointestinal upset, rash, hives, yeast infections, and in rare cases, colitis. Tazorac Counseling:  Patient advised that medication is irritating and drying.  Patient may need to apply sparingly and wash off after an hour before eventually leaving it on overnight.  The patient verbalized understanding of the proper use and possible adverse effects of tazorac.  All of the patient's questions and concerns were addressed. Mirvaso Counseling: Mirvaso is a topical medication which can decrease superficial blood flow where applied. Side effects are uncommon and include stinging, redness and allergic reactions. Cimzia Pregnancy And Lactation Text: This medication crosses the placenta but can be considered safe in certain situations. Cimzia may be excreted in breast milk. Protopic Pregnancy And Lactation Text: This medication is Pregnancy Category C. It is unknown if this medication is excreted in breast milk when applied topically. Dapsone Pregnancy And Lactation Text: This medication is Pregnancy Category C and is not considered safe during pregnancy or breast feeding. Xolair Pregnancy And Lactation Text: This medication is Pregnancy Category B and is considered safe during pregnancy. This medication is excreted in breast milk. Hydroxyzine Pregnancy And Lactation Text: This medication is not safe during pregnancy and should not be taken. It is also excreted in breast milk and breast feeding isn't recommended. Skyrizi Counseling: I discussed with the patient the risks of risankizumab-rzaa including but not limited to immunosuppression, and serious infections.  The patient understands that monitoring is required including a PPD at baseline and must alert us or the primary physician if symptoms of infection or other concerning signs are noted. Oxybutynin Counseling:  I discussed with the patient the risks of oxybutynin including but not limited to skin rash, drowsiness, dry mouth, difficulty urinating, and blurred vision. Winlevi Counseling:  I discussed with the patient the risks of topical clascoterone including but not limited to erythema, scaling, itching, and stinging. Patient voiced their understanding. Rinvoq Counseling: I discussed with the patient the risks of Rinvoq therapy including but not limited to upper respiratory tract infections, shingles, cold sores, bronchitis, nausea, cough, fever, acne, and headache. Live vaccines should be avoided.  This medication has been linked to serious infections; higher rate of mortality; malignancy and lymphoproliferative disorders; major adverse cardiovascular events; thrombosis; thrombocytopenia, anemia, and neutropenia; lipid elevations; liver enzyme elevations; and gastrointestinal perforations. Niacinamide Pregnancy And Lactation Text: These medications are considered safe during pregnancy. Cantharidin Counseling: Calcipotriene Counseling:  I discussed with the patient the risks of calcipotriene including but not limited to erythema, scaling, itching, and irritation. Aklief Pregnancy And Lactation Text: It is unknown if this medication is safe to use during pregnancy.  It is unknown if this medication is excreted in breast milk.  Breastfeeding women should use the topical cream on the smallest area of the skin for the shortest time needed while breastfeeding.  Do not apply to nipple and areola. Opioid Pregnancy And Lactation Text: These medications can lead to premature delivery and should be avoided during pregnancy. These medications are also present in breast milk in small amounts. Tazorac Pregnancy And Lactation Text: This medication is not safe during pregnancy. It is unknown if this medication is excreted in breast milk. Finasteride Male Counseling: Finasteride Counseling:  I discussed with the patient the risks of use of finasteride including but not limited to decreased libido, decreased ejaculate volume, gynecomastia, and depression. Women should not handle medication.  All of the patient's questions and concerns were addressed. Clindamycin Pregnancy And Lactation Text: This medication can be used in pregnancy if certain situations. Clindamycin is also present in breast milk. Fluconazole Counseling:  Patient counseled regarding adverse effects of fluconazole including but not limited to headache, diarrhea, nausea, upset stomach, liver function test abnormalities, taste disturbance, and stomach pain.  There is a rare possibility of liver failure that can occur when taking fluconazole.  The patient understands that monitoring of LFTs and kidney function test may be required, especially at baseline. The patient verbalized understanding of the proper use and possible adverse effects of fluconazole.  All of the patient's questions and concerns were addressed. Cosentyx Counseling:  I discussed with the patient the risks of Cosentyx including but not limited to worsening of Crohn's disease, immunosuppression, allergic reactions and infections.  The patient understands that monitoring is required including a PPD at baseline and must alert us or the primary physician if symptoms of infection or other concerning signs are noted. Ketoconazole Pregnancy And Lactation Text: This medication is Pregnancy Category C and it isn't know if it is safe during pregnancy. It is also excreted in breast milk and breast feeding isn't recommended. Gabapentin Counseling: I discussed with the patient the risks of gabapentin including but not limited to dizziness, somnolence, fatigue and ataxia. Qbrexza Counseling:  I discussed with the patient the risks of Qbrexza including but not limited to headache, mydriasis, blurred vision, dry eyes, nasal dryness, dry mouth, dry throat, dry skin, urinary hesitation, and constipation.  Local skin reactions including erythema, burning, stinging, and itching can also occur. Acitretin Pregnancy And Lactation Text: This medication is Pregnancy Category X and should not be given to women who are pregnant or may become pregnant in the future. This medication is excreted in breast milk. Hydroquinone Counseling:  Patient advised that medication may result in skin irritation, lightening (hypopigmentation), dryness, and burning.  In the event of skin irritation, the patient was advised to reduce the amount of the drug applied or use it less frequently.  Rarely, spots that are treated with hydroquinone can become darker (pseudoochronosis).  Should this occur, patient instructed to stop medication and call the office. The patient verbalized understanding of the proper use and possible adverse effects of hydroquinone.  All of the patient's questions and concerns were addressed. Olumiant Pregnancy And Lactation Text: Based on animal studies, Olumiant may cause embryo-fetal harm when administered to pregnant women.  The medication should not be used in pregnancy.  Breastfeeding is not recommended during treatment. Cantharidin Pregnancy And Lactation Text: The use of this medication during pregnancy or lactation is not recommended as there is insufficient data. Cyclosporine Counseling:  I discussed with the patient the risks of cyclosporine including but not limited to hypertension, gingival hyperplasia,myelosuppression, immunosuppression, liver damage, kidney damage, neurotoxicity, lymphoma, and serious infections. The patient understands that monitoring is required including baseline blood pressure, CBC, CMP, lipid panel and uric acid, and then 1-2 times monthly CMP and blood pressure. Quinolones Counseling:  I discussed with the patient the risks of fluoroquinolones including but not limited to GI upset, allergic reaction, drug rash, diarrhea, dizziness, photosensitivity, yeast infections, liver function test abnormalities, tendonitis/tendon rupture. Erivedge Counseling- I discussed with the patient the risks of Erivedge including but not limited to nausea, vomiting, diarrhea, constipation, weight loss, changes in the sense of taste, decreased appetite, muscle spasms, and hair loss.  The patient verbalized understanding of the proper use and possible adverse effects of Erivedge.  All of the patient's questions and concerns were addressed. Winlevi Pregnancy And Lactation Text: This medication is considered safe during pregnancy and breastfeeding. Nsaids Counseling: NSAID Counseling: I discussed with the patient that NSAIDs should be taken with food. Prolonged use of NSAIDs can result in the development of stomach ulcers.  Patient advised to stop taking NSAIDs if abdominal pain occurs.  The patient verbalized understanding of the proper use and possible adverse effects of NSAIDs.  All of the patient's questions and concerns were addressed.

## 2025-06-10 NOTE — ASSESSMENT & PLAN NOTE
Nonuser for 4 years.  Had been using pain medications for 10 years.  Went on Suboxone for 10 years.  Stopped the Suboxone and went on injectable naltrexone.  Continues to go to AA and NA meetings 2-3 meetings per week.  Has not had any recurrence of substance use or alcohol in 4 years.  Very successful outcome.        · Continue severe pathway treatment protocol  ? On HFNC 100%/40L +/- NRB   ? Antibiotics discontinued due to negative procal x 2  ? Completed course of Remdesivir   ? Continue steroids  ? Trend inflammatory markers: Ferritin, CRP, D-dimer QOD for possible Actemra dosing   ? AC: supratherapeutic INR  ? Encourage SP, and incentive spirometry  ?  Wean O2 to maintain saturations > 88-90%